# Patient Record
Sex: MALE | Race: WHITE | NOT HISPANIC OR LATINO | Employment: OTHER | URBAN - METROPOLITAN AREA
[De-identification: names, ages, dates, MRNs, and addresses within clinical notes are randomized per-mention and may not be internally consistent; named-entity substitution may affect disease eponyms.]

---

## 2017-01-19 ENCOUNTER — ALLSCRIPTS OFFICE VISIT (OUTPATIENT)
Dept: OTHER | Facility: OTHER | Age: 82
End: 2017-01-19

## 2017-02-03 ENCOUNTER — GENERIC CONVERSION - ENCOUNTER (OUTPATIENT)
Dept: OTHER | Facility: OTHER | Age: 82
End: 2017-02-03

## 2017-02-03 ENCOUNTER — TRANSCRIBE ORDERS (OUTPATIENT)
Dept: ADMINISTRATIVE | Facility: HOSPITAL | Age: 82
End: 2017-02-03

## 2017-02-03 LAB
AMBIG ABBREV CMP14 DEFAULT (HISTORICAL): NORMAL
AMBIG ABBREV LP DEFAULT (HISTORICAL): NORMAL

## 2017-02-04 LAB
CHOLEST SERPL-MCNC: 148 MG/DL (ref 100–199)
HDLC SERPL-MCNC: 41 MG/DL
INTERPRETATION (HISTORICAL): NORMAL
INTERPRETATION (HISTORICAL): NORMAL
LDLC SERPL CALC-MCNC: 56 MG/DL (ref 0–99)
PDF IMAGE (HISTORICAL): NORMAL
TRIGL SERPL-MCNC: 254 MG/DL (ref 0–149)
VLDLC SERPL CALC-MCNC: 51 MG/DL (ref 5–40)

## 2017-02-06 ENCOUNTER — GENERIC CONVERSION - ENCOUNTER (OUTPATIENT)
Dept: OTHER | Facility: OTHER | Age: 82
End: 2017-02-06

## 2017-02-10 ENCOUNTER — ALLSCRIPTS OFFICE VISIT (OUTPATIENT)
Dept: OTHER | Facility: OTHER | Age: 82
End: 2017-02-10

## 2017-03-23 ENCOUNTER — ALLSCRIPTS OFFICE VISIT (OUTPATIENT)
Dept: OTHER | Facility: OTHER | Age: 82
End: 2017-03-23

## 2017-07-24 ENCOUNTER — ALLSCRIPTS OFFICE VISIT (OUTPATIENT)
Dept: OTHER | Facility: OTHER | Age: 82
End: 2017-07-24

## 2017-08-04 ENCOUNTER — ALLSCRIPTS OFFICE VISIT (OUTPATIENT)
Dept: OTHER | Facility: OTHER | Age: 82
End: 2017-08-04

## 2017-08-07 ENCOUNTER — ALLSCRIPTS OFFICE VISIT (OUTPATIENT)
Dept: OTHER | Facility: OTHER | Age: 82
End: 2017-08-07

## 2017-08-16 ENCOUNTER — GENERIC CONVERSION - ENCOUNTER (OUTPATIENT)
Dept: OTHER | Facility: OTHER | Age: 82
End: 2017-08-16

## 2017-08-16 LAB
A/G RATIO (HISTORICAL): 2 (ref 1.2–2.2)
ALBUMIN SERPL BCP-MCNC: 4.6 G/DL (ref 3.5–4.7)
ALP SERPL-CCNC: 55 IU/L (ref 39–117)
ALT SERPL W P-5'-P-CCNC: 32 IU/L (ref 0–44)
AST SERPL W P-5'-P-CCNC: 27 IU/L (ref 0–40)
BASOPHILS # BLD AUTO: 0 %
BASOPHILS # BLD AUTO: 0 X10E3/UL (ref 0–0.2)
BILIRUB SERPL-MCNC: 0.8 MG/DL (ref 0–1.2)
BUN SERPL-MCNC: 17 MG/DL (ref 8–27)
BUN/CREA RATIO (HISTORICAL): 14 (ref 10–24)
CALCIUM SERPL-MCNC: 9.1 MG/DL (ref 8.6–10.2)
CHLORIDE SERPL-SCNC: 100 MMOL/L (ref 96–106)
CO2 SERPL-SCNC: 25 MMOL/L (ref 18–29)
CREAT SERPL-MCNC: 1.25 MG/DL (ref 0.76–1.27)
DEPRECATED RDW RBC AUTO: 12.9 % (ref 12.3–15.4)
EGFR AFRICAN AMERICAN (HISTORICAL): 62 ML/MIN/1.73
EGFR-AMERICAN CALC (HISTORICAL): 54 ML/MIN/1.73
EOSINOPHIL # BLD AUTO: 0.2 X10E3/UL (ref 0–0.4)
EOSINOPHIL # BLD AUTO: 4 %
GLUCOSE SERPL-MCNC: 136 MG/DL (ref 65–99)
HCT VFR BLD AUTO: 41.9 % (ref 37.5–51)
HGB BLD-MCNC: 15 G/DL (ref 12.6–17.7)
IMM.GRANULOCYTES (CD4/8) (HISTORICAL): 0 %
IMM.GRANULOCYTES (CD4/8) (HISTORICAL): 0 X10E3/UL (ref 0–0.1)
LYMPHOCYTES # BLD AUTO: 1.5 X10E3/UL (ref 0.7–3.1)
LYMPHOCYTES # BLD AUTO: 32 %
MCH RBC QN AUTO: 31.3 PG (ref 26.6–33)
MCHC RBC AUTO-ENTMCNC: 35.8 G/DL (ref 31.5–35.7)
MCV RBC AUTO: 88 FL (ref 79–97)
MONOCYTES # BLD AUTO: 0.5 X10E3/UL (ref 0.1–0.9)
MONOCYTES (HISTORICAL): 10 %
NEUTROPHILS # BLD AUTO: 2.6 X10E3/UL (ref 1.4–7)
NEUTROPHILS # BLD AUTO: 54 %
PLATELET # BLD AUTO: 181 X10E3/UL (ref 150–379)
POTASSIUM SERPL-SCNC: 4.2 MMOL/L (ref 3.5–5.2)
RBC (HISTORICAL): 4.79 X10E6/UL (ref 4.14–5.8)
SODIUM SERPL-SCNC: 141 MMOL/L (ref 134–144)
TOT. GLOBULIN, SERUM (HISTORICAL): 2.3 G/DL (ref 1.5–4.5)
TOTAL PROTEIN (HISTORICAL): 6.9 G/DL (ref 6–8.5)
WBC # BLD AUTO: 4.9 X10E3/UL (ref 3.4–10.8)

## 2017-08-17 ENCOUNTER — GENERIC CONVERSION - ENCOUNTER (OUTPATIENT)
Dept: OTHER | Facility: OTHER | Age: 82
End: 2017-08-17

## 2017-08-17 LAB
CREATININE, URINE (HISTORICAL): 181.1 MG/DL
HBA1C MFR BLD HPLC: 6.3 % (ref 4.8–5.6)
INTERPRETATION (HISTORICAL): NORMAL
MICROALBUM.,U,RANDOM (HISTORICAL): 7.6 UG/ML
MICROALBUMIN/CREATININE RATIO (HISTORICAL): 4.2 MG/G CREAT (ref 0–30)
PDF IMAGE (HISTORICAL): NORMAL

## 2017-08-21 ENCOUNTER — GENERIC CONVERSION - ENCOUNTER (OUTPATIENT)
Dept: OTHER | Facility: OTHER | Age: 82
End: 2017-08-21

## 2017-08-22 ENCOUNTER — GENERIC CONVERSION - ENCOUNTER (OUTPATIENT)
Dept: OTHER | Facility: OTHER | Age: 82
End: 2017-08-22

## 2017-08-22 ENCOUNTER — ALLSCRIPTS OFFICE VISIT (OUTPATIENT)
Dept: OTHER | Facility: OTHER | Age: 82
End: 2017-08-22

## 2017-10-02 ENCOUNTER — ALLSCRIPTS OFFICE VISIT (OUTPATIENT)
Dept: OTHER | Facility: OTHER | Age: 82
End: 2017-10-02

## 2017-10-03 NOTE — PROGRESS NOTES
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)   2  Callus (700) (L84)   3  Diabetes mellitus with neuropathy (250 60,357 2) (E11 40)    Plan   · *VB - Foot Exam; Status:Complete;   Done: 44UNH8712 11:06AM   · Follow-up visit in 9 weeks Evaluation and Treatment  Follow-up  Status: Hold For -  Scheduling  Requested for: 47FNC2726   · Diabetes Foot Exam Performed; Status:Complete;   Done: 72VHU2396 11:06AM   · Inspect your feet daily ; Status:Complete;   Done: 80FSI9946 11:06AM   · It is important to take good care of your feet if you have diabetes ; Status:Complete;    Done: 45HAW8154 11:06AM   · There are many things you can do at home to ensure good foot health ; Status:Complete;    Done: 90HIU9498 11:06AM   · Wear shoes that give your toes plenty of room ; Status:Complete;   Done: 54HOD4604  11:06AM    Discussion/Summary  The patient was counseled regarding instructions for management,-prognosis,-patient and family education,-risks and benefits of treatment options,-importance of compliance with treatment  Patient is able to Self-Care  The treatment plan was reviewed with the patient/guardian  The patient/guardian understands and agrees with the treatment plan      Chief Complaint  shoe return      History of Present Illness  HPI: Patient has pain in his toes which shoe wear and ambulation      Review of Systems    Constitutional: feeling poorly, but-as noted in HPI,-no fever-and-no chills  ENT: nasal discharge, but-as noted in HPI  Cardiovascular: no complaints of slow or fast heart rate, no chest pain, no palpitations, no leg claudication or lower extremity edema  Respiratory: cough, but-as noted in HPI  Gastrointestinal: no complaints of abdominal pain, no constipation, no nausea or vomiting, no diarrhea or bloody stools  Genitourinary: no complaints of dysuria or incontinence, no hesitancy, no nocturia, no genital lesion, no inadequacy of penile erection     Musculoskeletal: no complaints of arthralgia, no myalgia, no joint swelling or stiffness, no limb pain or swelling  Integumentary: no complaints of skin rash or lesion, no itching or dry skin, no skin wounds  Active Problems  1  Abnormal EKG (794 31) (R94 31)   2  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)   3  Acute knee pain, right   4  Basal cell carcinoma of skin (173 91) (C44 91)   5  Benign essential hypertension (401 1) (I10)   6  BMI 31 0-31 9,adult (V85 31) (Z68 31)   7  Callus (700) (L84)   8  Cough (786 2) (R05)   9  Deformity of ankle and foot, acquired (736 70) (M21 969)   10  Diabetes mellitus with neuropathy (250 60,357 2) (E11 40)   11  Diabetic neuropathy (250 60,357 2) (E11 40)   12  Dizziness (780 4) (R42)   13  Encounter for prostate cancer screening (V76 44) (Z12 5)   14  Encounter for screening for cardiovascular disorders (V81 2) (Z13 6)   15  Encounter for screening for malignant neoplasm of colon (V76 51) (Z12 11)   16  Encounter for screening for malignant neoplasm of prostate (V76 44) (Z12 5)   17  Hypercholesterolemia (272 0) (E78 00)   18  Hypokalemia (276 8) (E87 6)   19  Limb pain (729 5) (M79 609)   20  Medicare annual wellness visit, initial (V70 0) (Z00 00)   21  Minor depression (311) (F32 9)   22  Need for vaccination (V05 9) (Z23)   23  Never a smoker   24  Non-healing ulcer (707 9) (L98 499)   25  Obesity, Class I, BMI 30-34 9 (278 00) (E66 9)   26  Onychomycosis (110 1) (B35 1)   27  Open wound of foot, left, subsequent encounter (V58 89,892 0) (S91 302D)   28  Pain in both feet (729 5) (M79 671,M79 672)   29  Primary dysthymia (300 4) (F34 1)   30  Screening for hypothyroidism (V77 0) (Z13 29)   31   Wound of skin (782 9) (R23 8)    Past Medical History   · History of Acute upper respiratory infection (465 9) (J06 9)   · History of Ascending cholangitis (576 1) (K83 0)   · History of Atherosclerosis of arteries of extremities (440 20) (I70 209)   · History of Bug bite with infection (919 5,E906 4) (W57 XXXA)   · History of Callus (700) (L84)   · History of Need for vaccination (V05 9) (Z23)   · Screening for genitourinary condition (V81 6) (Z13 89)   · History of Screening for genitourinary condition (V81 6) (Z13 89)    The active problems and past medical history were reviewed and updated today  Surgical History   · History of Appendectomy   · History of Cholecystectomy   · History of Open Treatment Of Fracture Of The Radial Shaft   · History of Tonsillectomy    The surgical history was reviewed and updated today  Family History  Mother    · Family history of Heart disease   · Family history of HTN (hypertension)   · Family history of Liver cancer   · Family history of Melanoma  Father    · Family history of    · Family history of Gastric ulcer  Brother    · Family history of Diabetes  Grandparent    · Family history of Diabetes    Social History   · Never a smoker  The social history was reviewed and updated today  Current Meds   1  Amlodipine Besy-Benazepril HCl - 5-20 MG Oral Capsule; take 1 capsule daily; Therapy: 68AIX5024 to (Last Rx:61Bth8778)  Requested for: 37PHC6685 Ordered   2  Aspirin EC Low Dose 81 MG Oral Tablet Delayed Release; 1 every day; Therapy: 71Uyj2917 to (Last Rx:83Wcm0843) Ordered   3  Cheratussin -10 MG/5ML Oral Syrup; TAKE 5 - 10 ML EVERY 4 TO 6 HOURS AS   NEEDED FOR COUGH; Therapy: 59Jal3604 to (Evaluate:2017); Last Rx:99Zjt1033 Ordered   4  Cinnamon 500 MG Oral Tablet; Therapy: (Recorded:07Zwh3471) to Recorded   5  Coreg CR 20 MG Oral Capsule Extended Release 24 Hour; TAKE 1 CAPSULE EVERY   MORNING WITH FOOD; Therapy: 61AFQ6997 to (Last Rx:73Whg3866)  Requested for: 63IAL0037 Ordered   6  Daily Multiple Vitamins Oral Tablet; Therapy: (Recorded:33Igu4009) to Recorded   7  Triny-C Oral Tablet; Therapy: (Recorded:01Oya8683) to Recorded   8  Fish Oil 1000 MG Oral Capsule Delayed Release;    Therapy: (Recorded:2016) to Recorded   9  Flonase 50 MCG/ACT SUSP; Therapy: (Recorded:89Jmz6541) to Recorded   10  FreeStyle Test In Vitro Strip; test BID; Dx:  E11 4; Therapy: 80BTC9163 to (Last Rx:53Fwl0077)  Requested for: 01Usx2527 Ordered   11  Ibuprofen 600 MG Oral Tablet; TAKE 1 TABLET BY MOUTH 3 TIMES A DAY WITH FOOD    AS NEEDED; Therapy: 91XLE8346 to 40-45-11-94)  Requested for: 72Jjb5813; Last    Rx:91Nlh2830 Ordered   12  Magnesium Oxide 400 MG Oral Tablet; Therapy: (Recorded:48Zcq8354) to Recorded   13  Maxzide-25 TABS; 1/2 tab daily in AM Recorded   14  Mupirocin 2 % External Ointment; APPLY SPARINGLY TO AFFECTED AREA(S) TWICE    DAILY; Therapy: 62BVR2465 to (Last Rx:02Nov2016)  Requested for: 29KSQ1650 Ordered   15  Olopatadine HCl - 0 1 % Ophthalmic Solution; INSTILL 1 DROP INTO AFFECTED    EYE(S) TWICE DAILY AS DIRECTED; Therapy: 37Yyt7742 to (Last Rx:57Foh3557)  Requested for: 76Kyj0001 Ordered   16  Patanol 0 1 % Ophthalmic Solution; INSTILL 1 DROP INTO AFFECTED EYE(S) TWICE    DAILY AS DIRECTED as needed Recorded   17  Potassium Chloride Ghislaine ER 20 MEQ Oral Tablet Extended Release; take 2 tablets twice    a day; Therapy: 79DZX0812 to (Last Rx:56Muq9069)  Requested for: 09PZG7630 Ordered   18  Simvastatin 20 MG Oral Tablet; 1 Every Day At Bedtime; Therapy: 20YDI2918 to (Last Chapo )  Requested for: 57Byo1872 Ordered   19  Sulfamethoxazole-Trimethoprim 800-160 MG Oral Tablet; TAKE 1 TABLET TWICE    DAILY; Therapy: 99ZNX8374 to (Evaluate:68Eps7218)  Requested for: 39Whk4925; Last    Rx:10Big4991 Ordered   20  Triamterene-HCTZ 37 5-25 MG Oral Tablet; TAKE ONE-HALF (1/2) TABLET DAILY; Therapy: 00NMF1354 to (Last Rx:48Aon3335)  Requested for: 99CFI5485 Ordered   21  Viibryd 20 MG Oral Tablet; 1 every day; Therapy: 58MDX1548 to (Last Rx:01Mar2017)  Requested for: 31GQP2547 Ordered    The medication list was reviewed and updated today  Allergies  1   No Known Drug Allergies    Vitals   Recorded: 83RXQ7182 10:46AM   Heart Rate 80   Respiration 17   Systolic 543   Diastolic 83   Height 5 ft 11 in   Weight 221 lb    BMI Calculated 30 82   BSA Calculated 2 2     Physical Exam  Left Foot: Appearance: Normal except as noted: excessive pronation-and-pes planus  Great toe deformities include a bunion  Tenderness: None except the great toe  Right Foot: Appearance: Normal except as noted: excessive pronation-and-pes planus  Great toe deformities include a bunion  Tenderness: None except the great toe  Left Ankle: ROM: limited ROM in all planes Motor: diffuse weakness  Right Ankle: ROM: limited ROM in all planes Motor: diffuse weakness  Neurological Exam: performed  Light touch was decreased bilaterally  Vibratory sensation was decreased in both first metatarsophalangeal joints  Vascular Exam: performed Dorsalis pedis pulses were One/4 bilaterally  Posterior tibial pulses were One/4 bilaterally  Elevation Pallor: present bilaterally  Capillary refill time was greater than 3 seconds bilaterally-and-Q 9, findings bilateral  Negative digital hair  Edema: mild bilaterally-and-No Homans sign  Toenails: All of the toenails were elongated,-hypertrophied,-discolored,-shown to have subungual debris-and-Mycotic  Socks and shoes removed, Right Foot Findings: erythematous and dry  Diminished tactile sensation with monofilament testing throughout the right foot  Socks and shoes removed, Left Foot Findings: erythematous and dry  Diminished tactile sensation with monofilament testing throughout the left foot  Capillary refills findings on the right were delayed in the toes  Pulses:   1+ in the posterior tibialis on the right   1+ in the dorsalis pedis on the right  Capillary refills findings on the left were delayed in the toes  Pulses:   1+ in the posterior tibialis on the left   1+ in the dorsalis pedis on the left     Assign Risk Category: 2: Loss of protective sensation with or without weakness, deformity, callus, pre-ulcer, or history of ulceration  High risk  Hyperkeratosis: present on both first toes,-present on both first sub metatarsals-and-Pinch callus, bilateral    Shoe Gear Evaluation: performed ()  Right Foot: width: E -and-length: 12  Left Foot: width: E -and-length: 12  Recommendation(s): custom inlays  Procedure  Mycotic nails debrided  All pre-ulcerative lesions debrided without pain or complication  Patient will continue to use diabetic inlays as directed      Attending Note  Attending Note [facility]: Patient's History: Patient is concerned with wound of left foot  He has no history of trauma  Key Parts of the Exam: Patient is a superficial wound of the left foot  He has bandage daily  It appears healed        Signatures   Electronically signed by : Terri Turcios DPM; Oct  2 2017 11:07AM EST                       (Author)

## 2017-10-24 ENCOUNTER — GENERIC CONVERSION - ENCOUNTER (OUTPATIENT)
Dept: OTHER | Facility: OTHER | Age: 82
End: 2017-10-24

## 2017-12-04 ENCOUNTER — ALLSCRIPTS OFFICE VISIT (OUTPATIENT)
Dept: OTHER | Facility: OTHER | Age: 82
End: 2017-12-04

## 2017-12-05 NOTE — PROGRESS NOTES
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)   2  Callus (700) (L84)   3  Deformity of ankle and foot, acquired (736 70) (M21 969)   4  Diabetes mellitus with neuropathy (250 60,357 2) (E11 40)    Plan     · Follow-up visit in 9 weeks Evaluation and Treatment  Follow-up  Status: Hold For -Scheduling  Requested for: 62UHX5584   · Diabetes Foot Exam Performed; Status:Complete;   Done: 56ODI0213 10:38AM   · Inspect your feet daily ; Status:Complete;   Done: 22ETP8860 10:38AM   · It is important to take good care of your feet if you have diabetes ; Status:Complete;  Done: 78VPT4372 10:38AM   · *VB - Foot Exam; Status:Complete;   Done: 91VWJ6347 10:37AM    Discussion/Summary  The patient was counseled regarding instructions for management,-- patient and family education,-- importance of compliance with treatment  Patient is able to Self-Care  The treatment plan was reviewed with the patient/guardian  The patient/guardian understands and agrees with the treatment plan      Chief Complaint  Rodolfo TEMPLETON      History of Present Illness  HPI: Patient is a diabetic with pain in his feet with ambulation  No history of trauma  Patient is pre-ulcerative calluses  These hurt with ambulation  Review of Systems   Constitutional: feeling poorly, but-- as noted in HPI,-- no fever-- and-- no chills  ENT: nasal discharge, but-- as noted in HPI  Cardiovascular: no complaints of slow or fast heart rate, no chest pain, no palpitations, no leg claudication or lower extremity edema  Respiratory: cough, but-- as noted in HPI  Gastrointestinal: no complaints of abdominal pain, no constipation, no nausea or vomiting, no diarrhea or bloody stools  Genitourinary: no complaints of dysuria or incontinence, no hesitancy, no nocturia, no genital lesion, no inadequacy of penile erection  Musculoskeletal: no complaints of arthralgia, no myalgia, no joint swelling or stiffness, no limb pain or swelling  Integumentary: no complaints of skin rash or lesion, no itching or dry skin, no skin wounds  Active Problems  1  Abnormal EKG (794 31) (R94 31)   2  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)   3  Acute knee pain, right   4  Basal cell carcinoma of skin (173 91) (C44 91)   5  Benign essential hypertension (401 1) (I10)   6  BMI 31 0-31 9,adult (V85 31) (Z68 31)   7  Callus (700) (L84)   8  Cough (786 2) (R05)   9  Deformity of ankle and foot, acquired (736 70) (M21 969)   10  Diabetes mellitus with neuropathy (250 60,357 2) (E11 40)   11  Diabetic neuropathy (250 60,357 2) (E11 40)   12  Dizziness (780 4) (R42)   13  Encounter for prostate cancer screening (V76 44) (Z12 5)   14  Encounter for screening for cardiovascular disorders (V81 2) (Z13 6)   15  Encounter for screening for malignant neoplasm of colon (V76 51) (Z12 11)   16  Encounter for screening for malignant neoplasm of prostate (V76 44) (Z12 5)   17  Hypercholesterolemia (272 0) (E78 00)   18  Hypokalemia (276 8) (E87 6)   19  Limb pain (729 5) (M79 609)   20  Medicare annual wellness visit, initial (V70 0) (Z00 00)   21  Minor depression (311) (F32 9)   22  Need for vaccination (V05 9) (Z23)   23  Never a smoker   24  Non-healing ulcer (707 9) (L98 499)   25  Obesity, Class I, BMI 30-34 9 (278 00) (E66 9)   26  Onychomycosis (110 1) (B35 1)   27  Open wound of foot, left, subsequent encounter (V58 89,892 0) (S91 302D)   28  Pain in both feet (729 5) (M79 671,M79 672)   29  Primary dysthymia (300 4) (F34 1)   30  Screening for hypothyroidism (V77 0) (Z13 29)   31   Wound of skin (782 9) (R23 8)    Past Medical History   · History of Acute upper respiratory infection (465 9) (J06 9)   · History of Ascending cholangitis (576 1) (K83 0)   · History of Atherosclerosis of arteries of extremities (440 20) (I70 209)   · History of Bug bite with infection (919 5,E906 4) (W57 XXXA)   · History of Callus (700) (L84)   · History of Need for vaccination (V05 9) (Z23)   · Screening for genitourinary condition (V81 6) (Z13 89)   · History of Screening for genitourinary condition (V81 6) (Z13 89)    The active problems and past medical history were reviewed and updated today  Surgical History     · History of Appendectomy   · History of Cholecystectomy   · History of Open Treatment Of Fracture Of The Radial Shaft   · History of Tonsillectomy    The surgical history was reviewed and updated today  Family History  Mother    · Family history of Heart disease   · Family history of HTN (hypertension)   · Family history of Liver cancer   · Family history of Melanoma  Father    · Family history of    · Family history of Gastric ulcer  Brother    · Family history of Diabetes  Grandparent    · Family history of Diabetes    Social History     · Never a smoker  The social history was reviewed and updated today  Current Meds   1  Amlodipine Besy-Benazepril HCl - 5-20 MG Oral Capsule; take 1 capsule daily; Therapy: 22BSG6120 to (Last Rx:2017)  Requested for: 2017 Ordered   2  Aspirin EC Low Dose 81 MG Oral Tablet Delayed Release; 1 every day; Therapy: 35Xpi1190 to (Last Rx:21Ysc3299) Ordered   3  Cheratussin -10 MG/5ML Oral Syrup; TAKE 5 - 10 ML EVERY 4 TO 6 HOURS AS NEEDED FOR COUGH; Therapy: 96Onn5039 to (Evaluate:2017); Last Rx:84Rjn2787 Ordered   4  Cinnamon 500 MG Oral Tablet; Therapy: (Recorded:22Goq9050) to Recorded   5  Coreg CR 20 MG Oral Capsule Extended Release 24 Hour; TAKE 1 CAPSULE EVERY MORNING WITH FOOD; Therapy: 36ZHV9565 to (Last Rx:2017)  Requested for: 60YYO9187 Ordered   6  Daily Multiple Vitamins Oral Tablet; Therapy: (Recorded:95Ybx7591) to Recorded   7  Triny-C Oral Tablet; Therapy: (Recorded:14Hwz9022) to Recorded   8  Fish Oil 1000 MG Oral Capsule Delayed Release; Therapy: (Recorded:2016) to Recorded   9  Flonase 50 MCG/ACT SUSP; Therapy: (Recorded:73Owj1043) to Recorded   10   FreeStyle Test In Vitro Strip; test BID; Dx:  E11 4; Therapy: 69KKA2811 to (Last Rx:81Rem1594)  Requested for: 79Eln4806 Ordered   11  Ibuprofen 600 MG Oral Tablet; TAKE 1 TABLET BY MOUTH 3 TIMES A DAY WITH FOOD  AS NEEDED; Therapy: 50QSI0808 to 40-45-11-94)  Requested for: 60Sxj3932; Last  Rx:68Lzm2401 Ordered   12  Magnesium Oxide 400 MG Oral Tablet; Therapy: (Recorded:89Ntp3140) to Recorded   13  Maxzide-25 TABS; 1/2 tab daily in AM Recorded   14  Mupirocin 2 % External Ointment; APPLY SPARINGLY TO AFFECTED AREA(S) TWICE  DAILY; Therapy: 81OKR4641 to (Last Rx:02Nov2016)  Requested for: 43JQN2208 Ordered   15  Olopatadine HCl - 0 1 % Ophthalmic Solution; INSTILL 1 DROP INTO AFFECTED  EYE(S) TWICE DAILY AS DIRECTED; Therapy: 11Uqz6532 to (Last Rx:40Wew3789)  Requested for: 95Cjn9096 Ordered   16  Patanol 0 1 % Ophthalmic Solution; INSTILL 1 DROP INTO AFFECTED EYE(S) TWICE  DAILY AS DIRECTED as needed Recorded   17  Potassium Chloride Ghislaine ER 20 MEQ Oral Tablet Extended Release; take 2 tablets twice  a day; Therapy: 55BSK0383 to (Last Rx:55Ypb8067)  Requested for: 75WJZ8042 Ordered   18  Simvastatin 20 MG Oral Tablet; 1 Every Day At Bedtime; Therapy: 43WPT2377 to (Last Frausto Bis)  Requested for: 18Xro8550 Ordered   19  Sulfamethoxazole-Trimethoprim 800-160 MG Oral Tablet; TAKE 1 TABLET TWICE  DAILY; Therapy: 89BSV5361 to (Evaluate:56Fpr7896)  Requested for: 62Wag2326; Last  Rx:75Bsg4015 Ordered   20  Triamterene-HCTZ 37 5-25 MG Oral Tablet; TAKE ONE-HALF (1/2) TABLET DAILY; Therapy: 63NPX4437 to (Last Rx:14Nov2017)  Requested for: 43NLT4057 Ordered   21  Viibryd 20 MG Oral Tablet; 1 every day; Therapy: 56OPC2975 to (Last Rx:01Mar2017)  Requested for: 55ZNB7650 Ordered    The medication list was reviewed and updated today  Allergies  1   No Known Drug Allergies    Vitals   Recorded: 32KHT5690 10:13AM   Heart Rate 76   Respiration 16   Systolic 178   Diastolic 81   Height 5 ft 11 in   Weight 221 lb    BMI Calculated 30 82   BSA Calculated 2 2       Physical Exam  Left Foot: Appearance: Normal except as noted: excessive pronation-- and-- pes planus  Great toe deformities include a bunion  Tenderness: None except the great toe  Right Foot: Appearance: Normal except as noted: excessive pronation-- and-- pes planus  Great toe deformities include a bunion  Tenderness: None except the great toe  Left Ankle: ROM: limited ROM in all planes Motor: diffuse weakness  Right Ankle: ROM: limited ROM in all planes Motor: diffuse weakness  Neurological Exam: performed  Light touch was decreased bilaterally  Vibratory sensation was decreased in both first metatarsophalangeal joints  Vascular Exam: performed Dorsalis pedis pulses were One/4 bilaterally  Posterior tibial pulses were One/4 bilaterally  Elevation Pallor: present bilaterally  Capillary refill time was greater than 3 seconds bilaterally-- and-- Q 9, findings bilateral  Negative digital hair  Edema: mild bilaterally-- and-- No Homans sign  Toenails: All of the toenails were elongated,-- hypertrophied,-- discolored,-- shown to have subungual debris-- and-- Mycotic  Socks and shoes removed, Right Foot Findings: erythematous and dry  Diminished tactile sensation with monofilament testing throughout the right foot  Socks and shoes removed, Left Foot Findings: erythematous and dry  Diminished tactile sensation with monofilament testing throughout the left foot  Capillary refills findings on the right were delayed in the toes  Pulses:  1+ in the posterior tibialis on the right  1+ in the dorsalis pedis on the right  Capillary refills findings on the left were delayed in the toes  Pulses:  1+ in the posterior tibialis on the left  1+ in the dorsalis pedis on the left  Assign Risk Category: 2: Loss of protective sensation with or without weakness, deformity, callus, pre-ulcer, or history of ulceration  High risk     Hyperkeratosis: present on both first toes,-- present on both first sub metatarsals-- and-- Pinch callus, bilateral    Shoe Gear Evaluation: performed ()  Right Foot: width: E -- and-- length: 12  Left Foot: width: E -- and-- length: 12  Recommendation(s): custom inlays  Procedure  Patient educated on care of the diabetic foot  To reduce callus formation, patient would benefit from diabetic inlays        Signatures   Electronically signed by : Fiona Stafford DPM; Dec  4 2017 10:38AM EST                       (Author)

## 2018-01-08 ENCOUNTER — GENERIC CONVERSION - ENCOUNTER (OUTPATIENT)
Dept: OTHER | Facility: OTHER | Age: 83
End: 2018-01-08

## 2018-01-10 NOTE — RESULT NOTES
Verified Results  (1) CBC/PLT/DIFF 28Apr2016 09:50AM Jordan James     Test Name Result Flag Reference   WBC COUNT 5 60 Thousand/uL  4 80-10 80   WBC ADJUSTED 5 60 Thousand/uL  4 80-10 80   RBC COUNT 4 89 Million/uL  4 70-6 10   HEMOGLOBIN 14 9 g/dL  14 0-18 0   HEMATOCRIT 45 0 %  42 0-52 0   MCV 92 fL  82-98   MCH 30 6 pg  27 0-31 0   MCHC 33 3 g/dL  31 4-37 4   RDW 12 7 %  11 6-15 1   MPV 8 0 fL L 8 9-12 7   PLATELET COUNT 466 Thousands/uL  130-400   nRBC AUTOMATED 0 /100 WBCs     NEUTROPHILS RELATIVE PERCENT 58 %  43-75   LYMPHOCYTES RELATIVE PERCENT 28 %  14-44   MONOCYTES RELATIVE PERCENT 11 %  4-12   EOSINOPHILS RELATIVE PERCENT 3 %  0-6   BASOPHILS RELATIVE PERCENT 1 %  0-1   NEUTROPHILS ABSOLUTE COUNT 3 20 Thousands/µL  1 85-7 62   LYMPHOCYTES ABSOLUTE COUNT 1 50 Thousands/µL  0 60-4 47   MONOCYTES ABSOLUTE COUNT 0 60 Thousand/µL  0 17-1 22   EOSINOPHILS ABSOLUTE COUNT 0 20 Thousand/µL  0 00-0 61   BASOPHILS ABSOLUTE COUNT 0 00 Thousands/µL  0 00-0 10     (1) COMPREHENSIVE METABOLIC PANEL [id number] 08:81RX [place], [address] Kidney Disease Education Program recommendations are as follows:  GFR calculation is accurate only with a steady state creatinine  Chronic Kidney disease less than 60 ml/min/1 73 sq  meters  Kidney failure less than 15 ml/min/1 73 sq  meters  Test Name Result Flag Reference   GLUCOSE,RANDM 117 mg/dL     If the patient is fasting, the ADA then defines impaired fasting glucose as > 100 mg/dL and diabetes as > or equal to 123 mg/dL     SODIUM 142 mmol/L  136-145   POTASSIUM 3 9 mmol/L  3 5-5 3   CHLORIDE 104 mmol/L  100-108   CARBON DIOXIDE 28 mmol/L  21-32   ANION GAP (CALC) 10 mmol/L  4-13   BLOOD UREA NITROGEN 18 mg/dL  5-25   CREATININE 1 20 mg/dL  0 60-1 30   Standardized to IDMS reference method   CALCIUM 8 8 mg/dL  8 3-10 1   BILI, TOTAL 0 60 mg/dL  0 20-1 00   ALK PHOSPHATAS 56 U/L     ALT (SGPT) 33 U/L  12-78   AST(SGOT) 22 U/L  5-45   ALBUMIN 3 8 g/dL 3 5-5 0   TOTAL PROTEIN 7 1 g/dL  6 4-8 2   eGFR Non-African American 58 3 ml/min/1 73sq m       (1) LIPID PANEL, FASTING 28Apr2016 09:50AM [facility]   Triglyceride:         Normal              <150 mg/dl       Borderline High    150-199 mg/dl       High               200-499 mg/dl       Very High          >499 mg/dl  Cholesterol:         Desirable        <200 mg/dl      Borderline High  200-239 mg/dl      High             >239 mg/dl  HDL Cholesterol:        High    >59 mg/dL      Low     <41 mg/dL  LDL CALCULATED:    This screening LDL is a calculated result  It does not have the accuracy of the Direct Measured LDL in the monitoring of patients with hyperlipidemia and/or statin therapy  Direct Measure LDL (DCI350) must be ordered separately in these patients  Test Name Result Flag Reference   CHOLESTEROL 141 mg/dL     HDL,DIRECT 41 mg/dL  40-60   Specimen collection should occur prior to Metamizole administration due to the potential for falsely depressed results  LDL CHOLESTEROL CALCULATED 56 mg/dL  0-100   TRIGLYCERIDES 219 mg/dL H <=150   Specimen collection should occur prior to N-Acetylcysteine or Metamizole administration due to the potential for falsely depressed results  (1) TSH 28Apr2016 09:50AM [facility]   Patients undergoing fluorescein dye angiography may retain small amounts of fluorescein in the body for 48-72 hours post procedure  Samples containing fluorescein can produce falsely depressed TSH values  If the patient had this procedure,a specimen should be resubmitted post fluorescein clearance       Test Name Result Flag Reference   TSH 1 857 uIU/mL  0 358-3 740     (1) MICROALBUMIN CREATININE RATIO, RANDOM URINE 28Apr2016 09:50AM [facility]     Test Name Result Flag Reference   MICROALBUMIN/ CREAT R 10 mg/g creatinine  0-30   MICROALBUMIN,URINE 11 1 mg/L  0 0-20 0   CREATININE URINE 116 0 mg/dL         Discussion/Summary   please make appt to discuss labs

## 2018-01-12 VITALS
RESPIRATION RATE: 18 BRPM | DIASTOLIC BLOOD PRESSURE: 72 MMHG | SYSTOLIC BLOOD PRESSURE: 130 MMHG | BODY MASS INDEX: 31.36 KG/M2 | TEMPERATURE: 98.2 F | HEART RATE: 76 BPM | HEIGHT: 71 IN | WEIGHT: 224 LBS

## 2018-01-12 LAB
. (HISTORICAL): NORMAL

## 2018-01-12 NOTE — RESULT NOTES
Verified Results  (1) CBC/PLT/DIFF [id number] 10:19AM [facility]     Test Name Result Flag Reference   WBC 5 2 x10E3/uL  3 4-10 8   RBC 4 93 x10E6/uL  4 14-5 80   Hemoglobin 15 5 g/dL  12 6-17 7   Hematocrit 44 2 %  37 5-51 0   MCV 90 fL  79-97   MCH 31 4 pg  26 6-33 0   MCHC 35 1 g/dL  31 5-35 7   RDW 13 9 %  12 3-15 4   Platelets 059 I61E2/YZ  150-379   Neutrophils 59 %     Lymphs 29 %     Monocytes 8 %     Eos 3 %     Basos 1 %     Neutrophils (Absolute) 3 1 x10E3/uL  1 4-7 0   Lymphs (Absolute) 1 5 x10E3/uL  0 7-3 1   Monocytes(Absolute) 0 4 x10E3/uL  0 1-0 9   Eos (Absolute) 0 2 x10E3/uL  0 0-0 4   Baso (Absolute) 0 0 x10E3/uL  0 0-0 2   Immature Granulocytes 0 %     Immature Grans (Abs) 0 0 x10E3/uL  0 0-0 1     (1) COMPREHENSIVE METABOLIC PANEL [id number] 54:32SO [facility]     Test Name Result Flag Reference   Glucose, Serum 114 mg/dL H 65-99   BUN 17 mg/dL  8-27   Creatinine, Serum 1 18 mg/dL  0 76-1 27   eGFR If NonAfricn Am 58 mL/min/1 73 L >59   eGFR If Africn Am 67 mL/min/1 73  >59   BUN/Creatinine Ratio 14  10-22   Sodium, Serum 144 mmol/L  134-144   Potassium, Serum 4 1 mmol/L  3 5-5 2   Chloride, Serum 101 mmol/L     Carbon Dioxide, Total 26 mmol/L  18-29   Calcium, Serum 9 0 mg/dL  8 6-10 2   Protein, Total, Serum 6 6 g/dL  6 0-8 5   Albumin, Serum 4 4 g/dL  3 5-4 7   Globulin, Total 2 2 g/dL  1 5-4 5   A/G Ratio 2 0  1 1-2 5   Bilirubin, Total 0 7 mg/dL  0 0-1 2   Alkaline Phosphatase, S 56 IU/L     AST (SGOT) 21 IU/L  0-40   ALT (SGPT) 24 IU/L  0-44     [facility]) Cardiovascular Risk Assessment [id number] 10:19AM Tl Gruber     Test Name Result Flag Reference   Interpretation NTAP     PDF Image Not applicable       [facility]) [address] [place] [address] Blvd CKD Program [id number] 10:19AM Tl Gruber     Test Name Result Flag Reference   Interpretation Note     -------------------------------  CHRONIC KIDNEY DISEASE:  EGFR, BLOOD PRESSURE, AND PROTEINURIA ASSESSMENT  We presume eGFR has been less than 60 mL/min/1 73mE2 on at  least two occasions spaced at least 3 months apart  Current  eGFR is 58 mL/min/1 73mE2 corresponding to CKD stage 3a  Multiply eGFR by 1 159 if patient is   Potassium is within goal, 4 1 mmol/L  EGFR, BLOOD PRESSURE, AND PROTEINURIA TREATMENT SUGGESTIONS  -  Given patient's advanced age, we are unable to provide  specific blood pressure treatment suggestions  Individualize  blood pressure goals based on the patient's comorbidities  and to avoid orthostatic hypotension and other medication  side effects  Assessment of albuminuria (urine  albumin:creatinine ratio or urine protein:creatinine ratio  preferred) is recommended at least annually in CKD patients  for staging and disease prognosis  EGFR, BLOOD PRESSURE, AND PROTEINURIA FOLLOW-UP  -  fasting Renal Panel within 12 months; Spot Urine Panel is  recommended by KDOQI guidelines, at least yearly;  -  BONE and MINERAL ASSESSMENT  Calcium is within goal, 9 0 mg/dL  Carbon Dioxide is within  goal, 26 mmol/L  KDOQI guidelines recommend the measurement  of 25-hydroxy vitamin D in patients with CKD  BONE and MINERAL TREATMENT SUGGESTIONS  -  Interpretations require simultaneous measurements of serum  calcium and phosphorus  BONE and MINERAL FOLLOW-UP  -  fasting PTH with Renal Panel and 25-Hydroxy Vitamin D are  recommended by KDOQI guidelines, at least yearly;  -  LIPIDS ASSESSMENT  LDL-C is optimal, 56 mg/dL  Triglyceride is high, 254 mg/dL  Non-HDL Cholesterol is normal, 107 mg/dL  HDL-C is normal,  41 mg/dL  LIPIDS TREATMENT SUGGESTIONS  -  Therapeutic lifestyle changes are always valuable to  maintain optimal blood lipid status (diet, exercise, weight  management)  Continue statin if in use  Consider measurement  of LDL particle number or Apo B to adjudicate need for  further LDL lowering therapy   If statin cannot be tolerated  or increased, alternatives include use of an intestinal  agent (ezetimibe or bile acid sequestrant), niacin, and/or  fish oil  LIPIDS FOLLOW-UP  -  fasting Lipid Panel within 12 months;  -  ANEMIA ASSESSMENT  Hemoglobin is normal, 15 5 g/dL  Hemoglobin target assumes  CAMERON is not in use  ANEMIA TREATMENT SUGGESTIONS  -  No specific change of treatment is indicated at this time  ANEMIA FOLLOW-UP  -  CBC within 12 months;  -------------------------------  DISCLAIMER  These assessments and treatment suggestions are provided as  a convenience in support of the physician-patient  relationship and are not intended to replace the physician's  clinical judgment  They are derived from the national  guidelines in addition to other evidence and expert opinion  The clinician should consider this information within the  context of clinical opinion and the individual patient  SEE GUIDANCE FOR CHRONIC KIDNEY DISEASE PROGRAM: National  Kidney Foundation Kidney Disease Outcomes Quality Initiative  (KDOQI (TM)), with its limitations and disclaimers, are at  www kidney  org/professionals/KDOQI  Kidney Disease Improving  Global Outcomes (KDIGO) clinical practice guidelines are at  http://kdigo  org/home/guidelines/  The members of  Syeda Youssef national advisory panel are listed at  www  [employer] com  This program is intended for patients who  have been diagnosed with stages 3, 4, or pre-dialysis 5 CKD  It is not intended for children, pregnant patients, or  transplant patients  PDF Image   [facility]) Lipid Panel [id number] 10:19AM Ghada Varinder     Test Name Result Flag Reference   LDL Cholesterol Calc 56 mg/dL  0-99   VLDL Cholesterol Scooby 51 mg/dL H 5-40   HDL Cholesterol 41 mg/dL  >39   Triglycerides 254 mg/dL H 0-149   Cholesterol, Total 148 mg/dL  100-199     [facility]) [facility] CMP14 Default [id number] 10:19AM [facility]     Test Name Result Flag Reference   [facility] CMP14 Default Comment     A hand-written panel/profile was received from your office   In  accordance with the [facility] Ambiguous Test Code Policy dated July 5433, we have completed your order by using the closest currently  or formerly recognized AMA panel  We have assigned Comprehensive  Metabolic Panel (14), Test Code #869221 to this request   If this  is not the testing you wished to receive on this specimen, please  contact the [address] [place] [address] Client Inquiry/Technical Services Department  to clarify the test order  We appreciate your business  [facility]) [facility] Default [id number] 10:19AM Flo[facility] Al[facility]aco     Test Name Result Flag Reference   [facility] Default Comment     A hand-written panel/profile was received from your office  In  accordance with the [facility] Ambiguous Test Code Policy dated July 6031, we have completed your order by using the closest currently  or formerly recognized AMA panel  We have assigned Lipid Panel,  Test Code #616771 to this request  If this is not the testing you  wished to receive on this specimen, please contact the [address] [place] [address]  Client Inquiry/Technical Services Department to clarify the test  order  We appreciate your business         Discussion/Summary   will discuss labs at follow up appt

## 2018-01-12 NOTE — RESULT NOTES
Discussion/Summary   labs are outstanding     Verified Results  (1) CBC/PLT/DIFF [id number] 09:36AM Heidi Fuller     Test Name Result Flag Reference   WBC 4 9 x10E3/uL  3 4-10 8   RBC 4 79 x10E6/uL  4 14-5 80   Hemoglobin 15 0 g/dL  12 6-17 7   Hematocrit 41 9 %  37 5-51 0   MCV 88 fL  79-97   MCH 31 3 pg  26 6-33 0   MCHC 35 8 g/dL H 31 5-35 7   RDW 12 9 %  12 3-15 4   Platelets 701 A79J8/XZ  150-379   Neutrophils 54 %     Lymphs 32 %     Monocytes 10 %     Eos 4 %     Basos 0 %     Neutrophils (Absolute) 2 6 x10E3/uL  1 4-7 0   Lymphs (Absolute) 1 5 x10E3/uL  0 7-3 1   Monocytes(Absolute) 0 5 x10E3/uL  0 1-0 9   Eos (Absolute) 0 2 x10E3/uL  0 0-0 4   Baso (Absolute) 0 0 x10E3/uL  0 0-0 2   Immature Granulocytes 0 %     Immature Grans (Abs) 0 0 x10E3/uL  0 0-0 1     (1) COMPREHENSIVE METABOLIC PANEL [id number] 31:07MC Heidi Fuller     Test Name Result Flag Reference   Glucose, Serum 136 mg/dL H 65-99   BUN 17 mg/dL  8-27   Creatinine, Serum 1 25 mg/dL  0 76-1 27   BUN/Creatinine Ratio 14  10-24   Sodium, Serum 141 mmol/L  134-144   Potassium, Serum 4 2 mmol/L  3 5-5 2   Chloride, Serum 100 mmol/L     Carbon Dioxide, Total 25 mmol/L  18-29   Calcium, Serum 9 1 mg/dL  8 6-10 2   Protein, Total, Serum 6 9 g/dL  6 0-8 5   Albumin, Serum 4 6 g/dL  3 5-4 7   Globulin, Total 2 3 g/dL  1 5-4 5   A/G Ratio 2 0  1 2-2 2   Bilirubin, Total 0 8 mg/dL  0 0-1 2   Alkaline Phosphatase, S 55 IU/L     AST (SGOT) 27 IU/L  0-40   ALT (SGPT) 32 IU/L  0-44   eGFR If NonAfricn Am 54 mL/min/1 73 L >59   eGFR If Africn Am 62 mL/min/1 73  >59     (1) HEMOGLOBIN A1C [id number] 09:36AM Heidi Fuller     Test Name Result Flag Reference   Hemoglobin A1c 6 3 % H 4 8-5 6   Pre-diabetes: 5 7 - 6 4           Diabetes: >6 4           Glycemic control for adults with diabetes: <7 0

## 2018-01-12 NOTE — RESULT NOTES
Verified Results  (1) TISSUE EXAM [id number] 12:00AM Flores Lua     Test Name Result Flag Reference   LAB AP CASE REPORT (Report)     Surgical Pathology Report             Case: [id number]                   Authorizing Provider: Sahil Brewster DO     Collected:      11/09/2016           Pathologist:      Theodora Zarate MD      Received:      11/10/2016 1212        Specimen:  Skin, Other, Biopsy punch of right shoulder/lateral   LAB AP FINAL DIAGNOSIS (Report)     A  Skin, Biopsy punch of right shoulder/lateral:  - Basal cell carcinoma, nodular type with infiltrative features, extending   to the    peripheral borders of biopsy  Interpretation performed at [facility], [address] [place] [address]  Electronically signed by Theodora Zarate MD on 11/14/2016 at 3:17 PM   LAB AP SURGICAL ADDITIONAL INFORMATION (Report)     These tests were developed and their performance characteristics   determined by [facility] or [facility]  They may not be cleared or approved by the [place]  Food and   Drug Administration  The FDA has determined that such clearance or   approval is not necessary  These tests are used for clinical purposes  They should not be regarded as investigational or for research  This   laboratory has been approved by CLIA 88, designated as a high-complexity   laboratory and is qualified to perform these tests  LAB AP GROSS DESCRIPTION (Report)     A  The specimen is received in formalin, labeled with the patient's name   and hospital number, and is designated biopsy punch right   shoulder/lateral  The specimen consists of a tan portion of skin   measuring 0 3 cm in diameter, excised to a depth of 0 2-0 3 cm  The skin   surface appears keratotic  The margin is inked blue  The skin surface is   inked red  Entirely submitted  One cassette      Note: The estimated total formalin fixation time based upon information   provided by the submitting clinician and the standard processing schedule   is 27 5 hours   MAC       Plan  Basal cell carcinoma of skin    · 2 - Ria Childers MD, Veronica Momin (Dermatology) Physician Referral  Consult  Status: Hold For -  Scheduling  Requested for: [id number]  Care Summary provided  : Yes    Discussion/Summary   discussed resilts with pt

## 2018-01-13 VITALS
SYSTOLIC BLOOD PRESSURE: 132 MMHG | HEIGHT: 71 IN | BODY MASS INDEX: 31.92 KG/M2 | HEART RATE: 78 BPM | WEIGHT: 228 LBS | RESPIRATION RATE: 16 BRPM | DIASTOLIC BLOOD PRESSURE: 76 MMHG

## 2018-01-14 VITALS
BODY MASS INDEX: 31.5 KG/M2 | DIASTOLIC BLOOD PRESSURE: 73 MMHG | HEIGHT: 71 IN | RESPIRATION RATE: 16 BRPM | SYSTOLIC BLOOD PRESSURE: 131 MMHG | HEART RATE: 80 BPM | WEIGHT: 225 LBS

## 2018-01-14 VITALS
HEART RATE: 71 BPM | BODY MASS INDEX: 31.36 KG/M2 | SYSTOLIC BLOOD PRESSURE: 141 MMHG | DIASTOLIC BLOOD PRESSURE: 83 MMHG | RESPIRATION RATE: 17 BRPM | HEIGHT: 71 IN | WEIGHT: 224 LBS

## 2018-01-14 VITALS
HEIGHT: 71 IN | SYSTOLIC BLOOD PRESSURE: 132 MMHG | RESPIRATION RATE: 18 BRPM | HEART RATE: 72 BPM | BODY MASS INDEX: 30.94 KG/M2 | DIASTOLIC BLOOD PRESSURE: 80 MMHG | WEIGHT: 221 LBS | TEMPERATURE: 97.2 F

## 2018-01-14 VITALS
RESPIRATION RATE: 17 BRPM | DIASTOLIC BLOOD PRESSURE: 71 MMHG | HEART RATE: 76 BPM | BODY MASS INDEX: 31.5 KG/M2 | SYSTOLIC BLOOD PRESSURE: 118 MMHG | WEIGHT: 225 LBS | HEIGHT: 71 IN

## 2018-01-15 ENCOUNTER — GENERIC CONVERSION - ENCOUNTER (OUTPATIENT)
Dept: OTHER | Facility: OTHER | Age: 83
End: 2018-01-15

## 2018-01-15 VITALS
BODY MASS INDEX: 30.94 KG/M2 | RESPIRATION RATE: 17 BRPM | DIASTOLIC BLOOD PRESSURE: 83 MMHG | HEIGHT: 71 IN | SYSTOLIC BLOOD PRESSURE: 133 MMHG | WEIGHT: 221 LBS | HEART RATE: 80 BPM

## 2018-01-15 VITALS
RESPIRATION RATE: 20 BRPM | HEIGHT: 71 IN | DIASTOLIC BLOOD PRESSURE: 70 MMHG | SYSTOLIC BLOOD PRESSURE: 120 MMHG | WEIGHT: 225 LBS | BODY MASS INDEX: 31.5 KG/M2 | HEART RATE: 82 BPM | TEMPERATURE: 98.2 F

## 2018-01-16 NOTE — RESULT NOTES
Verified Results  (1) CK (CPK) [id number] 11:39AM Milton Verao     Test Name Result Flag Reference   CK (CPK) 223 U/L       (1) TROPONIN I [id number] 11:39AM [facility]     Test Name Result Flag Reference   TROPONIN I <0 02 ng/mL  <0 04   [employer] Chemistry analyzer 99% cutoff is > 0 04 ng/mL in network labs    o cTnI 99% cutoff is useful only when applied to patients in the clinical setting of myocardial ischemia  o cTnI 99% cutoff should be interpreted in the context of clinical history, ECG findings and possibly cardiac imaging to establish correct diagnosis  o cTnI 99% cutoff may be suggestive but clearly not indicative of a coronary event without the clinical setting of myocardial ischemia       (1) CK (CPK) [id number] 11:39AM Milton Verao     Test Name Result Flag Reference   CK MB FRACTION 1 8 ng/mL  0 0-5 0   CK-MB INDEX <1 0 %  0 0-2 5       Discussion/Summary   spoke with pt doing well , meds were adjusted at visit will follow

## 2018-01-22 VITALS
HEIGHT: 71 IN | RESPIRATION RATE: 16 BRPM | BODY MASS INDEX: 30.94 KG/M2 | SYSTOLIC BLOOD PRESSURE: 143 MMHG | HEART RATE: 76 BPM | DIASTOLIC BLOOD PRESSURE: 81 MMHG | WEIGHT: 221 LBS

## 2018-01-22 VITALS
SYSTOLIC BLOOD PRESSURE: 133 MMHG | DIASTOLIC BLOOD PRESSURE: 83 MMHG | BODY MASS INDEX: 30.94 KG/M2 | RESPIRATION RATE: 17 BRPM | HEART RATE: 80 BPM | WEIGHT: 221 LBS | HEIGHT: 71 IN

## 2018-01-23 NOTE — RESULT NOTES
Discussion/Summary   pt aware     Verified Results  [facility]) Pathology Report [id number] 12:00AM [employer]     Test Name Result Flag Reference     Material submitted:                      RIGHT TEMPLE   Material submitted:                                          RIGHT TEMPLE     Clinical history:                       CANCER   Clinical history:                                            CANCER          **********************************************************************  Diagnosis:  RIGHT TEMPLE:  SEBORRHEIC KERATOSIS, VERRUCOUS, EXCORIATED  ULCERATED  INFLAMED  PIN/01/12/2018  **********************************************************************   **********************************************************************  Diagnosis:  RIGHT TEMPLE:  SEBORRHEIC KERATOSIS, VERRUCOUS, EXCORIATED  ULCERATED  INFLAMED  PIN/01/12/2018  **********************************************************************     Electronically signed:                      Charbel Bond MD, Dermatopathologist   Electronically signed:                                        Charbel Bond MD, Dermatopathologist     (Report)     Gross description:                       RIGHT TEMPLE:  The specimen is received in a single container of formalin labeled  with the patients name  Received in formalin is 1 piece of skin  measuring 0 5 x 0 4 x 0 3 cm which is inked bisected and submitted in  toto in 1 cassette  Specimen may further fragment during processing  Erwin Vargas description:                                           RIGHT TEMPLE:  The specimen is received in a single container of formalin labeled  with the patients name  Received in formalin is 1 piece of skin  measuring 0 5 x 0 4 x 0 3 cm which is inked bisected and submitted in  toto in 1 cassette  Specimen may further fragment during processing  Erwin MONROE/MABEL     Pathologist provided ICD-10:  L82 1   Pathologist provided ICD-10:  L82 1           CPT    415250   CPT                                                            [id number]

## 2018-01-24 VITALS
TEMPERATURE: 98.6 F | HEIGHT: 71 IN | RESPIRATION RATE: 16 BRPM | DIASTOLIC BLOOD PRESSURE: 76 MMHG | SYSTOLIC BLOOD PRESSURE: 126 MMHG | HEART RATE: 84 BPM | BODY MASS INDEX: 31.92 KG/M2 | WEIGHT: 228 LBS

## 2018-02-05 ENCOUNTER — OFFICE VISIT (OUTPATIENT)
Dept: PODIATRY | Facility: CLINIC | Age: 83
End: 2018-02-05
Payer: COMMERCIAL

## 2018-02-05 VITALS
WEIGHT: 229 LBS | SYSTOLIC BLOOD PRESSURE: 138 MMHG | HEIGHT: 71 IN | RESPIRATION RATE: 17 BRPM | HEART RATE: 71 BPM | BODY MASS INDEX: 32.06 KG/M2 | DIASTOLIC BLOOD PRESSURE: 82 MMHG

## 2018-02-05 DIAGNOSIS — I70.209 PERIPHERAL ARTERIOSCLEROSIS (HCC): ICD-10-CM

## 2018-02-05 DIAGNOSIS — M79.672 PAIN IN BOTH FEET: ICD-10-CM

## 2018-02-05 DIAGNOSIS — L84 CORNS: Primary | ICD-10-CM

## 2018-02-05 DIAGNOSIS — M79.671 PAIN IN BOTH FEET: ICD-10-CM

## 2018-02-05 DIAGNOSIS — E11.42 DIABETIC POLYNEUROPATHY ASSOCIATED WITH TYPE 2 DIABETES MELLITUS (HCC): ICD-10-CM

## 2018-02-05 PROCEDURE — 11056 PARNG/CUTG B9 HYPRKR LES 2-4: CPT | Performed by: PODIATRIST

## 2018-02-05 PROCEDURE — PBNCHG PB NO CHARGE PLACEHOLDER: Performed by: PODIATRIST

## 2018-02-05 RX ORDER — OMEGA-3 FATTY ACIDS CAP DELAYED RELEASE 1000 MG 1000 MG
CAPSULE DELAYED RELEASE ORAL
COMMUNITY

## 2018-02-05 RX ORDER — FLUTICASONE PROPIONATE 50 MCG
SPRAY, SUSPENSION (ML) NASAL
COMMUNITY

## 2018-02-05 RX ORDER — TRIAMTERENE AND HYDROCHLOROTHIAZIDE 37.5; 25 MG/1; MG/1
TABLET ORAL
COMMUNITY
End: 2018-03-17 | Stop reason: SDUPTHER

## 2018-02-05 RX ORDER — SIMVASTATIN 20 MG
TABLET ORAL
COMMUNITY
Start: 2018-01-04 | End: 2018-02-27 | Stop reason: SDUPTHER

## 2018-02-05 RX ORDER — IBUPROFEN 600 MG/1
TABLET ORAL
COMMUNITY
Start: 2015-05-22 | End: 2018-04-16

## 2018-02-05 RX ORDER — GUAIFENESIN AND CODEINE PHOSPHATE 100; 10 MG/5ML; MG/5ML
5-10 SOLUTION ORAL
COMMUNITY
Start: 2017-04-17 | End: 2018-04-16

## 2018-02-05 RX ORDER — SULFAMETHOXAZOLE AND TRIMETHOPRIM 800; 160 MG/1; MG/1
1 TABLET ORAL 2 TIMES DAILY
COMMUNITY
Start: 2017-08-04 | End: 2019-10-11 | Stop reason: ALTCHOICE

## 2018-02-05 RX ORDER — MUPIROCIN CALCIUM 20 MG/G
CREAM TOPICAL 2 TIMES DAILY
COMMUNITY
Start: 2016-11-02 | End: 2021-05-11

## 2018-02-05 RX ORDER — ASPIRIN 81 MG/1
81 TABLET ORAL 3 TIMES WEEKLY
COMMUNITY
Start: 2017-08-22 | End: 2019-10-11 | Stop reason: ALTCHOICE

## 2018-02-05 RX ORDER — OLOPATADINE HYDROCHLORIDE 1 MG/ML
1 SOLUTION/ DROPS OPHTHALMIC 2 TIMES DAILY
COMMUNITY
Start: 2017-04-17 | End: 2021-04-29 | Stop reason: SDUPTHER

## 2018-02-05 RX ORDER — POTASSIUM CHLORIDE 20 MEQ/1
2 TABLET, EXTENDED RELEASE ORAL 2 TIMES DAILY
COMMUNITY
Start: 2016-10-11 | End: 2018-07-10 | Stop reason: SDUPTHER

## 2018-02-05 RX ORDER — VILAZODONE HYDROCHLORIDE 20 MG/1
TABLET ORAL DAILY
COMMUNITY
Start: 2016-12-19 | End: 2018-04-16 | Stop reason: SDUPTHER

## 2018-02-05 RX ORDER — BENZONATATE 200 MG/1
CAPSULE ORAL
COMMUNITY
Start: 2018-01-08 | End: 2018-04-16

## 2018-02-05 RX ORDER — AMLODIPINE BESYLATE AND BENAZEPRIL HYDROCHLORIDE 5; 20 MG/1; MG/1
CAPSULE ORAL
COMMUNITY
Start: 2018-01-04 | End: 2018-06-04 | Stop reason: SDUPTHER

## 2018-02-05 RX ORDER — CARVEDILOL PHOSPHATE 20 MG/1
CAPSULE, EXTENDED RELEASE ORAL
COMMUNITY
Start: 2018-01-04 | End: 2018-06-01 | Stop reason: SDUPTHER

## 2018-02-05 RX ORDER — INFLUENZA A VIRUSA/MICHIGAN/45/2015 X-275 (H1N1) ANTIGEN (FORMALDEHYDE INACTIVATED), INFLUENZA A VIRUS A/HONG KONG/4801/2014 X-263B (H3N2) ANTIGEN (FORMALDEHYDE INACTIVATED), AND INFLUENZA B VIRUS B/BRISBANE/60/2008 ANTIGEN (FORMALDEHYDE INACTIVATED) 60; 60; 60 UG/.5ML; UG/.5ML; UG/.5ML
INJECTION, SUSPENSION INTRAMUSCULAR
Refills: 0 | COMMUNITY
Start: 2017-11-30 | End: 2018-04-16

## 2018-02-05 NOTE — PROGRESS NOTES
Assessment/Plan:    No problem-specific Assessment & Plan notes found for this encounter  There are no diagnoses linked to this encounter  Subjective:      Patient ID: Claudette Makos is a 81 y o  male  The patient was counseled regarding instructions for management,-- patient and family education,-- importance of compliance with treatment  Patient is able to Self-Care  The treatment plan was reviewed with the patient/guardian  The patient/guardian understands and agrees with the treatment plan      Chief Complaint  Rodolfo TEMPLETON      History of Present Illness  HPI: Patient is a diabetic with pain in his feet with ambulation  No history of trauma  Patient is pre-ulcerative calluses  These hurt with ambulation  Review of Systems   Constitutional: feeling poorly, but-- as noted in HPI,-- no fever-- and-- no chills  ENT: nasal discharge, but-- as noted in HPI  Cardiovascular: no complaints of slow or fast heart rate, no chest pain, no palpitations, no leg claudication or lower extremity edema  Respiratory: cough, but-- as noted in HPI  Gastrointestinal: no complaints of abdominal pain, no constipation, no nausea or vomiting, no diarrhea or bloody stools  Genitourinary: no complaints of dysuria or incontinence, no hesitancy, no nocturia, no genital lesion, no inadequacy of penile erection  Musculoskeletal: no complaints of arthralgia, no myalgia, no joint swelling or stiffness, no limb pain or swelling  Integumentary: no complaints of skin rash or lesion, no itching or dry skin, no skin wounds  Active Problems  1  Abnormal EKG (794 31) (R94 31)   2  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)   3  Acute knee pain, right   4  Basal cell carcinoma of skin (173 91) (C44 91)   5  Benign essential hypertension (401 1) (I10)   6  BMI 31 0-31 9,adult (V85 31) (Z68 31)   7  Callus (700) (L84)   8  Cough (786 2) (R05)   9  Deformity of ankle and foot, acquired (736 70) (M21 969)   10  Diabetes mellitus with neuropathy (250 60,357 2) (E11 40)   11  Diabetic neuropathy (250 60,357 2) (E11 40)   12  Dizziness (780 4) (R42)   13  Encounter for prostate cancer screening (V76 44) (Z12 5)   14  Encounter for screening for cardiovascular disorders (V81 2) (Z13 6)   15  Encounter for screening for malignant neoplasm of colon (V76 51) (Z12 11)   16  Encounter for screening for malignant neoplasm of prostate (V76 44) (Z12 5)   17  Hypercholesterolemia (272 0) (E78 00)   18  Hypokalemia (276 8) (E87 6)   19  Limb pain (729 5) (M79 609)   20  Medicare annual wellness visit, initial (V70 0) (Z00 00)   21  Minor depression (311) (F32 9)   22  Need for vaccination (V05 9) (Z23)   23  Never a smoker   24  Non-healing ulcer (707 9) (L98 499)   25  Obesity, Class I, BMI 30-34 9 (278 00) (E66 9)   26  Onychomycosis (110 1) (B35 1)   27  Open wound of foot, left, subsequent encounter (V58 89,892 0) (S91 302D)   28  Pain in both feet (729 5) (M79 671,M79 672)   29  Primary dysthymia (300 4) (F34 1)   30  Screening for hypothyroidism (V77 0) (Z13 29)   31  Wound of skin (782 9) (R23 8)     Past Medical History   · History of Acute upper respiratory infection (465 9) (J06 9)   · History of Ascending cholangitis (576 1) (K83 0)   · History of Atherosclerosis of arteries of extremities (440 20) (I70 209)   · History of Bug bite with infection (919 5,E906 4) (W57 XXXA)   · History of Callus (700) (L84)   · History of Need for vaccination (V05 9) (Z23)   · Screening for genitourinary condition (V81 6) (Z13 89)   · History of Screening for genitourinary condition (V81 6) (Z13 89)     The active problems and past medical history were reviewed and updated today  Surgical History      · History of Appendectomy   · History of Cholecystectomy   · History of Open Treatment Of Fracture Of The Radial Shaft   · History of Tonsillectomy     The surgical history was reviewed and updated today         Family History  Mother    · Family history of Heart disease   · Family history of HTN (hypertension)   · Family history of Liver cancer   · Family history of Melanoma  Father    · Family history of    · Family history of Gastric ulcer  Brother    · Family history of Diabetes  Grandparent    · Family history of Diabetes     Social History      · Never a smoker  The social history was reviewed and updated today  Current Meds   1  Amlodipine Besy-Benazepril HCl - 5-20 MG Oral Capsule; take 1 capsule daily; Therapy: 17PAX0362 to (Last Rx:57Dbc1019)  Requested for: 45Zjo2011 Ordered   2  Aspirin EC Low Dose 81 MG Oral Tablet Delayed Release; 1 every day; Therapy: 01Tfr5658 to (Last Rx:02Uka3287) Ordered   3  Cheratussin -10 MG/5ML Oral Syrup; TAKE 5 - 10 ML EVERY 4 TO 6 HOURS AS NEEDED FOR COUGH; Therapy: 26Eoa4428 to (Evaluate:2017); Last Rx:43Ivr2344 Ordered   4  Cinnamon 500 MG Oral Tablet; Therapy: (Recorded:12Vgu4790) to Recorded   5  Coreg CR 20 MG Oral Capsule Extended Release 24 Hour; TAKE 1 CAPSULE EVERY MORNING WITH FOOD; Therapy: 95VDM1518 to (Last Rx:67Fxi8482)  Requested for: 48BKS1319 Ordered   6  Daily Multiple Vitamins Oral Tablet; Therapy: (Recorded:66Lce8631) to Recorded   7  Triny-C Oral Tablet; Therapy: (Recorded:86Gzk3513) to Recorded   8  Fish Oil 1000 MG Oral Capsule Delayed Release; Therapy: (Recorded:98Xdw6495) to Recorded   9  Flonase 50 MCG/ACT SUSP; Therapy: (Recorded:19Ujk2362) to Recorded   10  FreeStyle Test In Vitro Strip; test BID; Dx:  E11 4; Therapy: 02CEB2595 to (Last Rx:48Qzn0904)  Requested for: 96Zlt3421 Ordered   11  Ibuprofen 600 MG Oral Tablet; TAKE 1 TABLET BY MOUTH 3 TIMES A DAY WITH FOOD  AS NEEDED; Therapy: 50LGV6920 to 77 873 135)  Requested for: 30Wjj3717; Last  Rx:87Ijs4795 Ordered   12  Magnesium Oxide 400 MG Oral Tablet; Therapy: (Recorded:23Sbm4475) to Recorded   13  Maxzide-25 TABS; 1/2 tab daily in AM Recorded   14   Mupirocin 2 % External Ointment; APPLY SPARINGLY TO AFFECTED AREA(S) TWICE  DAILY; Therapy: 21JCX5934 to (Last Rx:02Nov2016)  Requested for: 56PYG9697 Ordered   15  Olopatadine HCl - 0 1 % Ophthalmic Solution; INSTILL 1 DROP INTO AFFECTED  EYE(S) TWICE DAILY AS DIRECTED; Therapy: 92Pvb0575 to (Last Rx:10Vbz3257)  Requested for: 52Zim9881 Ordered   16  Patanol 0 1 % Ophthalmic Solution; INSTILL 1 DROP INTO AFFECTED EYE(S) TWICE  DAILY AS DIRECTED as needed Recorded   17  Potassium Chloride Ghislaine ER 20 MEQ Oral Tablet Extended Release; take 2 tablets twice  a day; Therapy: 68DYC9173 to (Last Rx:70Mhw7840)  Requested for: 41EJJ3788 Ordered   18  Simvastatin 20 MG Oral Tablet; 1 Every Day At Bedtime; Therapy: 62QUR7871 to (Last Adela Honey)  Requested for: 09Xsa8962 Ordered   19  Sulfamethoxazole-Trimethoprim 800-160 MG Oral Tablet; TAKE 1 TABLET TWICE  DAILY; Therapy: 83DEE7475 to (Evaluate:16Lmg8208)  Requested for: 90Mho7625; Last  Rx:84Bbd9829 Ordered   20  Triamterene-HCTZ 37 5-25 MG Oral Tablet; TAKE ONE-HALF (1/2) TABLET DAILY; Therapy: 18WTV1257 to (Last Rx:14Nov2017)  Requested for: 98KBF3537 Ordered   21  Viibryd 20 MG Oral Tablet; 1 every day; Therapy: 72DBV0273 to (Last Rx:01Mar2017)  Requested for: 58RRM2425 Ordered     The medication list was reviewed and updated today  Allergies  1  No Known Drug Allergies     Vitals        Physical Exam  Left Foot: Appearance: Normal except as noted: excessive pronation-- and-- pes planus  Great toe deformities include a bunion  Tenderness: None except the great toe  Right Foot: Appearance: Normal except as noted: excessive pronation-- and-- pes planus  Great toe deformities include a bunion  Tenderness: None except the great toe  Left Ankle: ROM: limited ROM in all planes Motor: diffuse weakness  Right Ankle: ROM: limited ROM in all planes Motor: diffuse weakness  Neurological Exam: performed  Light touch was decreased bilaterally   Vibratory sensation was decreased in both first metatarsophalangeal joints  Vascular Exam: performed Dorsalis pedis pulses were One/4 bilaterally  Posterior tibial pulses were One/4 bilaterally  Elevation Pallor: present bilaterally  Capillary refill time was greater than 3 seconds bilaterally-- and-- Q 9, findings bilateral  Negative digital hair  Edema: mild bilaterally-- and-- No Homans sign  Toenails: All of the toenails were elongated,-- hypertrophied,-- discolored,-- shown to have subungual debris-- and-- Mycotic  Socks and shoes removed, Right Foot Findings: erythematous and dry  Diminished tactile sensation with monofilament testing throughout the right foot  Socks and shoes removed, Left Foot Findings: erythematous and dry  Diminished tactile sensation with monofilament testing throughout the left foot  Capillary refills findings on the right were delayed in the toes  Pulses:  1+ in the posterior tibialis on the right  1+ in the dorsalis pedis on the right  Capillary refills findings on the left were delayed in the toes  Pulses:  1+ in the posterior tibialis on the left  1+ in the dorsalis pedis on the left  Assign Risk Category: 2: Loss of protective sensation with or without weakness, deformity, callus, pre-ulcer, or history of ulceration  High risk  Hyperkeratosis: present on both first toes,-- present on both first sub metatarsals-- and-- Pinch callus, bilateral    Shoe Gear Evaluation: performed ()  Right Foot: width: E -- and-- length: 12  Left Foot: width: E -- and-- length: 12  Recommendation(s): custom inlays  Procedure  Patient educated on care of the diabetic foot  To reduce callus formation, patient would benefit from diabetic inlays  HPI    The following portions of the patient's history were reviewed and updated as appropriate: allergies, current medications, past family history, past medical history, past social history, past surgical history and problem list     Review of Systems      Objective:      Foot ExamPhysical Exam

## 2018-02-27 DIAGNOSIS — E78.00 HYPERCHOLESTEROLEMIA: Primary | ICD-10-CM

## 2018-02-27 DIAGNOSIS — E11.42 DIABETIC POLYNEUROPATHY ASSOCIATED WITH TYPE 2 DIABETES MELLITUS (HCC): ICD-10-CM

## 2018-02-27 RX ORDER — SIMVASTATIN 20 MG
TABLET ORAL
Qty: 90 TABLET | Refills: 1 | Status: SHIPPED | OUTPATIENT
Start: 2018-02-27 | End: 2018-05-16 | Stop reason: SDUPTHER

## 2018-03-17 DIAGNOSIS — I10 BENIGN ESSENTIAL HYPERTENSION: Primary | ICD-10-CM

## 2018-03-18 RX ORDER — TRIAMTERENE AND HYDROCHLOROTHIAZIDE 37.5; 25 MG/1; MG/1
TABLET ORAL
Qty: 45 TABLET | Refills: 1 | Status: SHIPPED | OUTPATIENT
Start: 2018-03-18 | End: 2018-12-04 | Stop reason: SDUPTHER

## 2018-04-16 ENCOUNTER — OFFICE VISIT (OUTPATIENT)
Dept: PODIATRY | Facility: CLINIC | Age: 83
End: 2018-04-16
Payer: COMMERCIAL

## 2018-04-16 ENCOUNTER — OFFICE VISIT (OUTPATIENT)
Dept: FAMILY MEDICINE CLINIC | Facility: CLINIC | Age: 83
End: 2018-04-16
Payer: COMMERCIAL

## 2018-04-16 VITALS
HEIGHT: 71 IN | SYSTOLIC BLOOD PRESSURE: 138 MMHG | RESPIRATION RATE: 16 BRPM | BODY MASS INDEX: 32.06 KG/M2 | DIASTOLIC BLOOD PRESSURE: 78 MMHG | HEART RATE: 67 BPM | WEIGHT: 229 LBS

## 2018-04-16 VITALS
WEIGHT: 231 LBS | SYSTOLIC BLOOD PRESSURE: 134 MMHG | BODY MASS INDEX: 32.34 KG/M2 | HEIGHT: 71 IN | HEART RATE: 84 BPM | DIASTOLIC BLOOD PRESSURE: 80 MMHG | TEMPERATURE: 97.4 F | RESPIRATION RATE: 16 BRPM

## 2018-04-16 DIAGNOSIS — I10 BENIGN ESSENTIAL HYPERTENSION: ICD-10-CM

## 2018-04-16 DIAGNOSIS — I70.209 PERIPHERAL ARTERIOSCLEROSIS (HCC): Primary | ICD-10-CM

## 2018-04-16 DIAGNOSIS — F32.A MINOR DEPRESSION: ICD-10-CM

## 2018-04-16 DIAGNOSIS — E11.42 DIABETIC POLYNEUROPATHY ASSOCIATED WITH TYPE 2 DIABETES MELLITUS (HCC): ICD-10-CM

## 2018-04-16 DIAGNOSIS — M79.672 PAIN IN BOTH FEET: ICD-10-CM

## 2018-04-16 DIAGNOSIS — L84 CORNS: ICD-10-CM

## 2018-04-16 DIAGNOSIS — D48.5 NEOPLASM OF UNCERTAIN BEHAVIOR OF SKIN OF UPPER ARM: Primary | ICD-10-CM

## 2018-04-16 DIAGNOSIS — M79.671 PAIN IN BOTH FEET: ICD-10-CM

## 2018-04-16 DIAGNOSIS — E78.00 HYPERCHOLESTEROLEMIA: ICD-10-CM

## 2018-04-16 PROCEDURE — 3725F SCREEN DEPRESSION PERFORMED: CPT | Performed by: FAMILY MEDICINE

## 2018-04-16 PROCEDURE — 3008F BODY MASS INDEX DOCD: CPT | Performed by: FAMILY MEDICINE

## 2018-04-16 PROCEDURE — 11300 SHAVE SKIN LESION 0.5 CM/<: CPT | Performed by: FAMILY MEDICINE

## 2018-04-16 PROCEDURE — 3075F SYST BP GE 130 - 139MM HG: CPT | Performed by: FAMILY MEDICINE

## 2018-04-16 PROCEDURE — 1101F PT FALLS ASSESS-DOCD LE1/YR: CPT | Performed by: FAMILY MEDICINE

## 2018-04-16 PROCEDURE — 3079F DIAST BP 80-89 MM HG: CPT | Performed by: FAMILY MEDICINE

## 2018-04-16 PROCEDURE — 99214 OFFICE O/P EST MOD 30 MIN: CPT | Performed by: FAMILY MEDICINE

## 2018-04-16 PROCEDURE — 4040F PNEUMOC VAC/ADMIN/RCVD: CPT | Performed by: FAMILY MEDICINE

## 2018-04-16 PROCEDURE — 11056 PARNG/CUTG B9 HYPRKR LES 2-4: CPT | Performed by: PODIATRIST

## 2018-04-16 RX ORDER — VILAZODONE HYDROCHLORIDE 20 MG/1
20 TABLET ORAL DAILY
Qty: 90 TABLET | Refills: 0 | Status: SHIPPED | OUTPATIENT
Start: 2018-04-16 | End: 2018-09-17 | Stop reason: SDUPTHER

## 2018-04-16 RX ORDER — VILAZODONE HYDROCHLORIDE 20 MG/1
20 TABLET ORAL DAILY
Qty: 90 TABLET | Refills: 0 | Status: SHIPPED | OUTPATIENT
Start: 2018-04-16 | End: 2018-04-16 | Stop reason: SDUPTHER

## 2018-04-16 NOTE — PROGRESS NOTES
Assessment/Plan:    No problem-specific Assessment & Plan notes found for this encounter  There are no diagnoses linked to this encounter  Subjective:      History of Present Illness  HPI: Patient is a diabetic with pain in his feet with ambulation  No history of trauma  Patient is pre-ulcerative calluses  These hurt with ambulation       Review of Systems   Constitutional: feeling poorly, but-- as noted in HPI,-- no fever-- and-- no chills    ENT: nasal discharge, but-- as noted in HPI   Cardiovascular: no complaints of slow or fast heart rate, no chest pain, no palpitations, no leg claudication or lower extremity edema   Respiratory: cough, but-- as noted in HPI   Gastrointestinal: no complaints of abdominal pain, no constipation, no nausea or vomiting, no diarrhea or bloody stools   Genitourinary: no complaints of dysuria or incontinence, no hesitancy, no nocturia, no genital lesion, no inadequacy of penile erection   Musculoskeletal: no complaints of arthralgia, no myalgia, no joint swelling or stiffness, no limb pain or swelling   Integumentary: no complaints of skin rash or lesion, no itching or dry skin, no skin wounds       Active Problems  1  Abnormal EKG (794 31) (R94 31)   2  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)   3  Acute knee pain, right   4  Basal cell carcinoma of skin (173 91) (C44 91)   5  Benign essential hypertension (401 1) (I10)   6  BMI 31 0-31 9,adult (V85 31) (Z68 31)   7  Callus (700) (L84)   8  Cough (786 2) (R05)   9  Deformity of ankle and foot, acquired (736 70) (M21 969)   10  Diabetes mellitus with neuropathy (250 60,357 2) (E11 40)   11  Diabetic neuropathy (250 60,357 2) (E11 40)   12  Dizziness (780 4) (R42)   13  Encounter for prostate cancer screening (V76 44) (Z12 5)   14  Encounter for screening for cardiovascular disorders (V81 2) (Z13 6)   15  Encounter for screening for malignant neoplasm of colon (V76 51) (Z12 11)   16  Encounter for screening for malignant neoplasm of prostate (V76 44) (Z12 5)   17  Hypercholesterolemia (272 0) (E78 00)   18  Hypokalemia (276 8) (E87 6)   19  Limb pain (729 5) (M79 609)   20  Medicare annual wellness visit, initial (V70 0) (Z00 00)   21  Minor depression (311) (F32 9)   22  Need for vaccination (V05 9) (Z23)   23  Never a smoker   24  Non-healing ulcer (707 9) (L98 499)   25  Obesity, Class I, BMI 30-34 9 (278 00) (E66 9)   26  Onychomycosis (110 1) (B35 1)   27  Open wound of foot, left, subsequent encounter (V58 89,892 0) (S91 302D)   28  Pain in both feet (729 5) (M79 671,M79 672)   29  Primary dysthymia (300 4) (F34 1)   30  Screening for hypothyroidism (V77 0) (Z13 29)   31  Wound of skin (782 9) (R23 8)     Past Medical History   · History of Acute upper respiratory infection (465 9) (J06 9)   · History of Ascending cholangitis (576 1) (K83 0)   · History of Atherosclerosis of arteries of extremities (440 20) (I70 209)   · History of Bug bite with infection (919 5,E906 4) (W57 XXXA)   · History of Callus (700) (L84)   · History of Need for vaccination (V05 9) (Z23)   · Screening for genitourinary condition (V81 6) (Z13 89)   · History of Screening for genitourinary condition (V81 6) (Z13 89)     The active problems and past medical history were reviewed and updated today       Surgical History      · History of Appendectomy   · History of Cholecystectomy   · History of Open Treatment Of Fracture Of The Radial Shaft   · History of Tonsillectomy     The surgical history was reviewed and updated today        Family History  Mother    · Family history of Heart disease   · Family history of HTN (hypertension)   · Family history of Liver cancer   · Family history of Melanoma  Father    · Family history of    · Family history of Gastric ulcer  Brother    · Family history of Diabetes  Grandparent    · Family history of Diabetes     Social History      · Never a smoker  The social history was reviewed and updated today     Current Meds   1  Amlodipine Besy-Benazepril HCl - 5-20 MG Oral Capsule; take 1 capsule daily; Therapy: 57Ybw8367 to (Last Rx:16Oct2017)  Requested for: 16Oct2017 Ordered   2  Aspirin EC Low Dose 81 MG Oral Tablet Delayed Release; 1 every day; Therapy: 91Oar3799 to (Last Rx:45Tin2368) Ordered   3  Cheratussin -10 MG/5ML Oral Syrup; TAKE 5 - 10 ML EVERY 4 TO 6 HOURS AS NEEDED FOR COUGH; Therapy: 32Zjm6004 to (Evaluate:21Apr2017); Last Rx:59Aoc3151 Ordered   4  Cinnamon 500 MG Oral Tablet; Therapy: (Recorded:04Yzm3017) to Recorded   5  Coreg CR 20 MG Oral Capsule Extended Release 24 Hour; TAKE 1 CAPSULE EVERY MORNING WITH FOOD; Therapy: 78QKR4380 to (Last Rx:16Oct2017)  Requested for: 60XKQ2949 Ordered   6  Daily Multiple Vitamins Oral Tablet; Therapy: (Recorded:01Ami8155) to Recorded   7  Triny-C Oral Tablet; Therapy: (Recorded:04Mub9445) to Recorded   8  Fish Oil 1000 MG Oral Capsule Delayed Release; Therapy: (Recorded:02Nov2016) to Recorded   9  Flonase 50 MCG/ACT SUSP; Therapy: (Recorded:07Eql1770) to Recorded   10  FreeStyle Test In Vitro Strip; test BID;  Dx:  B44 3Durrell Miracle: 65FUG1334 to (Last Rx:83Kih4193)  Requested for: 37OSY1435 Ordered   11  Ibuprofen 600 MG Oral Tablet; TAKE 1 TABLET BY MOUTH 3 TIMES A DAY WITH FOOD  AS NEEDED;  Therapy: 85JCD2298 to (Evaluate:19Oct2017)  Requested for: 14Iae8709; Last  Rx:18Ctt1087 Ordered   12  Magnesium Oxide 400 MG Oral Tablet;  Therapy: (Recorded:49Wmf5337) to Recorded   13  Maxzide-25 TABS; 1/2 tab daily in AM Recorded   14  Mupirocin 2 % External Ointment; APPLY SPARINGLY TO AFFECTED AREA(S) TWICE Pennelope Kail: 87RHV5928 to (Last Rx:02Nov2016)  Requested for: 51TXS8757 Ordered   15  Olopatadine HCl - 0 1 % Ophthalmic Solution; INSTILL 1 DROP INTO AFFECTED  EYE(S) TWICE DAILY AS DIRECTED;  Therapy: 22VQH6143 to (Last Rx:17Apr2017)  Requested for: 17Apr2017 Ordered   16  Patanol 0 1 % Ophthalmic Solution; INSTILL 1 DROP INTO AFFECTED EYE(S) TWICE  DAILY AS DIRECTED as needed Recorded   17  Potassium Chloride Ghislaine ER 20 MEQ Oral Tablet Extended Release; take 2 tablets twice  a day;  Therapy: 39MGA5281 to (Last Rx:87Vwo2173)  Requested for: 08SJO0429 Ordered   18  Simvastatin 20 MG Oral Tablet; 1 Every Day At Bedtime;  Therapy: 08PYY5693 to (Last Rx:77Gws1043)  Requested for: 35Ehm1981 Ordered   19  Sulfamethoxazole-Trimethoprim 800-160 MG Oral Tablet; TAKE 1 TABLET TWICE [place]: 65IOJ1873 to (Evaluate:06Wze1170)  Requested for: 58Aek0336; Last  Rx:20Ckj4310 Ordered   20  Triamterene-HCTZ 37 5-25 MG Oral Tablet; TAKE ONE-HALF (1/2) TABLET DAILY;  Therapy: 61SNC6551 to (Last Rx:14Nov2017)  Requested for: 90WCX1856 Ordered   21  Viibryd 20 MG Oral Tablet; 1 every day; Marti Faes: 41LCQ2145 to (Last Rx:01Mar2017)  Requested for: 91EKW5638 Ordered     The medication list was reviewed and updated today       Allergies  1  No Known Drug Allergies     Vitals        Physical Exam  Left Foot: Appearance: Normal except as noted: excessive pronation-- and-- pes planus  Great toe deformities include a bunion  Tenderness: None except the great toe  Right Foot: Appearance: Normal except as noted: excessive pronation-- and-- pes planus  Great toe deformities include a bunion  Tenderness: None except the great toe  Left Ankle: ROM: limited ROM in all planes Motor: diffuse weakness  Right Ankle: ROM: limited ROM in all planes Motor: diffuse weakness  Neurological Exam: performed  Light touch was decreased bilaterally  Vibratory sensation was decreased in both first metatarsophalangeal joints  Vascular Exam: performed Dorsalis pedis pulses were One/4 bilaterally  Posterior tibial pulses were One/4 bilaterally  Elevation Pallor: present bilaterally  Capillary refill time was greater than 3 seconds bilaterally-- and-- Q 9, findings bilateral  Negative digital hair  Edema: mild bilaterally-- and-- No Homans sign  Toenails:  All of the toenails were elongated,-- hypertrophied,-- discolored,-- shown to have subungual debris-- and-- Mycotic     Socks and shoes removed, Right Foot Findings: erythematous and dry  Diminished tactile sensation with monofilament testing throughout the right foot   Socks and shoes removed, Left Foot Findings: erythematous and dry  Diminished tactile sensation with monofilament testing throughout the left foot  Capillary refills findings on the right were delayed in the toes   Pulses:  1+ in the posterior tibialis on the right  1+ in the dorsalis pedis on the right   Capillary refills findings on the left were delayed in the toes   Pulses:  1+ in the posterior tibialis on the left  1+ in the dorsalis pedis on the left   Assign Risk Category: 2: Loss of protective sensation with or without weakness, deformity, callus, pre-ulcer, or history of ulceration  High risk  Hyperkeratosis: present on both first toes,-- present on both first sub metatarsals-- and-- Pinch callus, bilateral    Shoe Gear Evaluation: performed ()  Right Foot: width: E -- and-- length: 12  Left Foot: width: E -- and-- length: 12  Recommendation(s): custom inlays       Procedure  Patient educated on care of the diabetic foot  To reduce callus formation, patient would benefit from diabetic inlays  Patient ID: Pilo Vaughan is a 81 y o  male  HPI    The following portions of the patient's history were reviewed and updated as appropriate: allergies, current medications, past family history, past medical history, past social history, past surgical history and problem list     Review of Systems      Objective: Foot Exam    Right Foot/Ankle     Inspection and Palpation  Skin Exam: callus and dry skin;     Neurovascular  Dorsalis pedis: 1+  Posterior tibial: 1+      Left Foot/Ankle      Inspection and Palpation  Skin Exam: callus and dry skin;     Neurovascular  Dorsalis pedis: 1+  Posterior tibial: 1+        Physical Exam   Cardiovascular: Pulses are weak pulses  Pulses:       Dorsalis pedis pulses are 1+ on the right side, and 1+ on the left side  Posterior tibial pulses are 1+ on the right side, and 1+ on the left side  Feet:   Right Foot:   Skin Integrity: Positive for callus and dry skin  Left Foot:   Skin Integrity: Positive for callus and dry skin  Patient's shoes and socks removed  Right Foot/Ankle   Right Foot Inspection  Skin Exam: dry skin, callus and callus                          Toe Exam: erythema  Sensory   Vibration: diminished  Proprioception: diminished   Monofilament testing: diminished  Vascular  Capillary refills: elevated  The right DP pulse is 1+  The right PT pulse is 1+  Left Foot/Ankle  Left Foot Inspection  Skin Exam: dry skin and callus                         Toe Exam: erythema                   Sensory   Vibration: diminished  Proprioception: diminished  Monofilament: diminished  Vascular  Capillary refills: elevated  The left DP pulse is 1+  The left PT pulse is 1+  Assign Risk Category:  Deformity present;  Loss of protective sensation; Weak pulses       Risk: 2

## 2018-04-16 NOTE — PROGRESS NOTES
Assessment/Plan:    Problem List Items Addressed This Visit     Diabetic polyneuropathy associated with type 2 diabetes mellitus ([facility] [address] )    Relevant Orders    Comprehensive metabolic panel    Lipid Panel with Direct LDL reflex    HEMOGLOBIN A1C W/ EAG ESTIMATION    CBC and differential    Microalbumin / creatinine urine ratio    Hypercholesterolemia    Relevant Orders    Comprehensive metabolic panel    Lipid Panel with Direct LDL reflex    HEMOGLOBIN A1C W/ EAG ESTIMATION    CBC and differential    Microalbumin / creatinine urine ratio    Benign essential hypertension    Relevant Orders    Comprehensive metabolic panel    Lipid Panel with Direct LDL reflex    HEMOGLOBIN A1C W/ EAG ESTIMATION    CBC and differential    Microalbumin / creatinine urine ratio    Minor depression    Relevant Medications    vilazodone (VIIBRYD) 20 mg tablet    Other Relevant Orders    Comprehensive metabolic panel    Lipid Panel with Direct LDL reflex    HEMOGLOBIN A1C W/ EAG ESTIMATION    CBC and differential    Microalbumin / creatinine urine ratio      Other Visit Diagnoses     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    -  Primary    Relevant Orders    Pathology Report          There are no Patient Instructions on file for this visit  Return for Next scheduled follow up  Subjective:      Patient ID: Jessica Carreon is a 81 y o  male  Chief Complaint   Patient presents with    lump on left arm     lj       Pt has a lesion on his lefty arm, that he has had a few months states the lsion has been chnaging slowly,  Getting bigger      Pt states while he is here he needs a refill of his viibryd    Pt also states he needs orders for his labs    Pt denies cp, no sob at this time  Denies polyuria no polydipsia  n chnages in vision        The following portions of the patient's history were reviewed and updated as appropriate: allergies, current medications, past family history, past medical history, past social history, past surgical history and problem list     Review of Systems   Constitutional: Negative for activity change, appetite change, chills, diaphoresis, fatigue, fever and unexpected weight change  HENT: Negative for congestion, dental problem, ear pain, mouth sores, sinus pain, sinus pressure, sore throat and trouble swallowing  Eyes: Negative for photophobia, discharge and itching  Respiratory: Negative for apnea, chest tightness and shortness of breath  Cardiovascular: Negative for chest pain, palpitations and leg swelling  Gastrointestinal: Negative for abdominal distention, abdominal pain, blood in stool, nausea and vomiting  Endocrine: Negative for cold intolerance, heat intolerance, polydipsia, polyphagia and polyuria  Genitourinary: Negative for difficulty urinating  Musculoskeletal: Negative for arthralgias  Skin: Negative for color change and wound  skkin lesion   Neurological: Negative for dizziness, syncope, speech difficulty and headaches  Hematological: Negative for adenopathy  Psychiatric/Behavioral: Negative for agitation and behavioral problems           Current Outpatient Prescriptions   Medication Sig Dispense Refill    amLODIPine-benazepril (LOTREL 5-20) 5-20 MG per capsule       aspirin (ASPIRIN LOW DOSE) 81 mg EC tablet Take 81 mg by mouth 3 (three) times a week        Bioflavonoid Products (ZULEYKA-C) 500-200-60 MG TABS Take by mouth      carvedilol (COREG CR) 20 MG 24 hr capsule       Cinnamon 500 MG TABS Take by mouth      fluticasone (FLONASE) 50 mcg/act nasal spray into each nostril      glucose blood (FREESTYLE LITE) test strip by In Vitro route      Magnesium Oxide 140 MG CAPS Take by mouth      Multiple Vitamins-Iron (DAILY MULTIPLE VITAMIN/IRON PO) Take by mouth      olopatadine (PATANOL) 0 1 % ophthalmic solution Apply 1 drop to eye 2 (two) times a day      Omega-3 Fatty Acids (FISH OIL) 1000 MG CPDR Take by mouth      potassium chloride (K-DUR,KLOR-CON) 20 mEq tablet Take 2 tablets by mouth 2 (two) times a day      simvastatin (ZOCOR) 20 mg tablet TAKE 1 TABLET DAILY AT  BEDTIME 90 tablet 1    triamterene-hydrochlorothiazide (MAXZIDE-25) 37 5-25 mg per tablet TAKE ONE-HALF (1/2) TABLET  DAILY 45 tablet 1    vilazodone (VIIBRYD) 20 mg tablet Take 1 tablet (20 mg total) by mouth daily 90 tablet 0    mupirocin (BACTROBAN) 2 % cream Apply topically 2 (two) times a day      sulfamethoxazole-trimethoprim (BACTRIM DS) 800-160 mg per tablet Take 1 tablet by mouth 2 (two) times a day       No current facility-administered medications for this visit  Objective:    /80   Pulse 84   Temp (!) 97 4 °F (36 3 °C)   Resp 16   Ht 5' 11" (1 803 m)   Wt 105 kg (231 lb)   BMI 32 22 kg/m²        Physical Exam       Shave lesion  Date/Time: 4/16/2018 4:39 PM  Performed by: Therese Meeks by: Tonny Stuart     Number of Lesions: 1  Lesion 1:     Body area: upper extremity    Upper extremity location: L upper arm    Initial size (mm): 4    Final defect size (mm): 4    Malignancy: malignancy unknown      Destruction method: shave removal      Comments:  Inflammed lesion present  3cc 2% lido with epi injected to the base of the lesion  Using 10 blade the lesion was excised     Pt tolerated procedure well  Verba consent obtained          Cain Gonzalez DO

## 2018-04-21 LAB
PATH REPORT.SITE OF ORIGIN SPEC: NORMAL
PAYMENT PROCEDURE: NORMAL
SL AMB .: NORMAL

## 2018-05-16 DIAGNOSIS — E78.00 HYPERCHOLESTEROLEMIA: ICD-10-CM

## 2018-05-16 DIAGNOSIS — E11.42 DIABETIC POLYNEUROPATHY ASSOCIATED WITH TYPE 2 DIABETES MELLITUS (HCC): ICD-10-CM

## 2018-05-16 RX ORDER — SIMVASTATIN 20 MG
TABLET ORAL
Qty: 90 TABLET | Refills: 3 | Status: SHIPPED | OUTPATIENT
Start: 2018-05-16 | End: 2019-03-23 | Stop reason: SDUPTHER

## 2018-05-25 ENCOUNTER — DOCUMENTATION (OUTPATIENT)
Dept: FAMILY MEDICINE CLINIC | Facility: CLINIC | Age: 83
End: 2018-05-25

## 2018-05-25 DIAGNOSIS — L03.114 CELLULITIS OF LEFT UPPER EXTREMITY: Primary | ICD-10-CM

## 2018-06-01 DIAGNOSIS — I10 BENIGN ESSENTIAL HYPERTENSION: Primary | ICD-10-CM

## 2018-06-04 DIAGNOSIS — I10 BENIGN ESSENTIAL HYPERTENSION: Primary | ICD-10-CM

## 2018-06-04 RX ORDER — AMLODIPINE BESYLATE AND BENAZEPRIL HYDROCHLORIDE 5; 20 MG/1; MG/1
CAPSULE ORAL
Qty: 90 CAPSULE | Refills: 1 | Status: SHIPPED | OUTPATIENT
Start: 2018-06-04 | End: 2018-11-16 | Stop reason: SDUPTHER

## 2018-06-04 RX ORDER — CARVEDILOL PHOSPHATE 20 MG/1
CAPSULE, EXTENDED RELEASE ORAL
Qty: 90 CAPSULE | Refills: 1 | Status: SHIPPED | OUTPATIENT
Start: 2018-06-04 | End: 2018-11-16 | Stop reason: SDUPTHER

## 2018-07-10 ENCOUNTER — OFFICE VISIT (OUTPATIENT)
Dept: PODIATRY | Facility: CLINIC | Age: 83
End: 2018-07-10
Payer: COMMERCIAL

## 2018-07-10 VITALS
HEIGHT: 71 IN | RESPIRATION RATE: 16 BRPM | BODY MASS INDEX: 32.34 KG/M2 | HEART RATE: 67 BPM | SYSTOLIC BLOOD PRESSURE: 138 MMHG | WEIGHT: 231 LBS | DIASTOLIC BLOOD PRESSURE: 76 MMHG

## 2018-07-10 DIAGNOSIS — M79.672 PAIN IN BOTH FEET: ICD-10-CM

## 2018-07-10 DIAGNOSIS — I70.209 PERIPHERAL ARTERIOSCLEROSIS (HCC): Primary | ICD-10-CM

## 2018-07-10 DIAGNOSIS — M79.671 PAIN IN BOTH FEET: ICD-10-CM

## 2018-07-10 DIAGNOSIS — L84 CORNS: ICD-10-CM

## 2018-07-10 DIAGNOSIS — E11.42 DIABETIC POLYNEUROPATHY ASSOCIATED WITH TYPE 2 DIABETES MELLITUS (HCC): ICD-10-CM

## 2018-07-10 DIAGNOSIS — I10 BENIGN ESSENTIAL HYPERTENSION: Primary | ICD-10-CM

## 2018-07-10 PROCEDURE — 11056 PARNG/CUTG B9 HYPRKR LES 2-4: CPT | Performed by: PODIATRIST

## 2018-07-10 RX ORDER — POTASSIUM CHLORIDE 20 MEQ/1
40 TABLET, EXTENDED RELEASE ORAL 2 TIMES DAILY
Qty: 360 TABLET | Refills: 1 | Status: SHIPPED | OUTPATIENT
Start: 2018-07-10 | End: 2018-12-05 | Stop reason: SDUPTHER

## 2018-07-10 NOTE — PROGRESS NOTES
Procedures   Foot Exam        Subjective:  patient, a diabetic white male, complains of pain in his feet with ambulation  No history of trauma     History of Present Illness  HPI: Patient is a diabetic with pain in his feet with ambulation  No history of trauma  Patient is pre-ulcerative calluses  These hurt with ambulation       Review of Systems   Constitutional: feeling poorly, but-- as noted in HPI,-- no fever-- and-- no chills    ENT: nasal discharge, but-- as noted in HPI   Cardiovascular: no complaints of slow or fast heart rate, no chest pain, no palpitations, no leg claudication or lower extremity edema   Respiratory: cough, but-- as noted in HPI   Gastrointestinal: no complaints of abdominal pain, no constipation, no nausea or vomiting, no diarrhea or bloody stools   Genitourinary: no complaints of dysuria or incontinence, no hesitancy, no nocturia, no genital lesion, no inadequacy of penile erection   Musculoskeletal: no complaints of arthralgia, no myalgia, no joint swelling or stiffness, no limb pain or swelling   Integumentary: no complaints of skin rash or lesion, no itching or dry skin, no skin wounds       Active Problems  1  Abnormal EKG (794 31) (R94 31)   2  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)   3  Acute knee pain, right   4  Basal cell carcinoma of skin (173 91) (C44 91)   5  Benign essential hypertension (401 1) (I10)   6  BMI 31 0-31 9,adult (V85 31) (Z68 31)   7  Callus (700) (L84)   8  Cough (786 2) (R05)   9  Deformity of ankle and foot, acquired (736 70) (M21 969)   10  Diabetes mellitus with neuropathy (250 60,357 2) (E11 40)   11  Diabetic neuropathy (250 60,357 2) (E11 40)   12  Dizziness (780 4) (R42)   13  Encounter for prostate cancer screening (V76 44) (Z12 5)   14  Encounter for screening for cardiovascular disorders (V81 2) (Z13 6)   15  Encounter for screening for malignant neoplasm of colon (V76 51) (Z12 11)   16  Encounter for screening for malignant neoplasm of prostate (V76 44) (Z12 5)   17  Hypercholesterolemia (272 0) (E78 00)   18  Hypokalemia (276 8) (E87 6)   19  Limb pain (729 5) (M79 609)   20  Medicare annual wellness visit, initial (V70 0) (Z00 00)   21  Minor depression (311) (F32 9)   22  Need for vaccination (V05 9) (Z23)   23  Never a smoker   24  Non-healing ulcer (707 9) (L98 499)   25  Obesity, Class I, BMI 30-34 9 (278 00) (E66 9)   26  Onychomycosis (110 1) (B35 1)   27  Open wound of foot, left, subsequent encounter (V58 89,892 0) (S91 302D)   28  Pain in both feet (729 5) (M79 671,M79 672)   29  Primary dysthymia (300 4) (F34 1)   30  Screening for hypothyroidism (V77 0) (Z13 29)   31  Wound of skin (782 9) (R23 8)     Past Medical History   · History of Acute upper respiratory infection (465 9) (J06 9)   · History of Ascending cholangitis (576 1) (K83 0)   · History of Atherosclerosis of arteries of extremities (440 20) (I70 209)   · History of Bug bite with infection (919 5,E906 4) (W57 XXXA)   · History of Callus (700) (L84)   · History of Need for vaccination (V05 9) (Z23)   · Screening for genitourinary condition (V81 6) (Z13 89)   · History of Screening for genitourinary condition (V81 6) (Z13 89)     The active problems and past medical history were reviewed and updated today       Surgical History      · History of Appendectomy   · History of Cholecystectomy   · History of Open Treatment Of Fracture Of The Radial Shaft   · History of Tonsillectomy     The surgical history was reviewed and updated today        Family History  Mother    · Family history of Heart disease   · Family history of HTN (hypertension)   · Family history of Liver cancer   · Family history of Melanoma  Father    · Family history of    · Family history of Gastric ulcer  Brother    · Family history of Diabetes  Grandparent    · Family history of Diabetes     Social History      · Never a smoker  The social history was reviewed and updated today       Current Meds   1  Amlodipine Besy-Benazepril HCl - 5-20 MG Oral Capsule; take 1 capsule daily; Therapy: 27Nob2353 to (Last Rx:16Oct2017)  Requested for: 16Oct2017 Ordered   2  Aspirin EC Low Dose 81 MG Oral Tablet Delayed Release; 1 every day; Therapy: 48Iew8205 to (Last Rx:13Gpu1100) Ordered   3  Cheratussin -10 MG/5ML Oral Syrup; TAKE 5 - 10 ML EVERY 4 TO 6 HOURS AS NEEDED FOR COUGH; Therapy: 32Hce5387 to (Evaluate:21Apr2017); Last Rx:56Ekz8930 Ordered   4  Cinnamon 500 MG Oral Tablet; Therapy: (Recorded:48Imh9592) to Recorded   5  Coreg CR 20 MG Oral Capsule Extended Release 24 Hour; TAKE 1 CAPSULE EVERY MORNING WITH FOOD; Therapy: 37HZT2430 to (Last Rx:16Oct2017)  Requested for: 67CJS9222 Ordered   6  Daily Multiple Vitamins Oral Tablet; Therapy: (Recorded:25Fnh4025) to Recorded   7  Triny-C Oral Tablet; Therapy: (Recorded:45Dki2612) to Recorded   8  Fish Oil 1000 MG Oral Capsule Delayed Release; Therapy: (Recorded:02Nov2016) to Recorded   9  Flonase 50 MCG/ACT SUSP; Therapy: (Recorded:97Wch1015) to Recorded   10  FreeStyle Test In Vitro Strip; test BID;  Dx:  O20 5Fonnie Jr: 30DPO3174 to (Last Rx:73Mpb5559)  Requested for: 37RUY6761 Ordered   11  Ibuprofen 600 MG Oral Tablet; TAKE 1 TABLET BY MOUTH 3 TIMES A DAY WITH FOOD  AS NEEDED;  Therapy: 47FMS9390 to (Evaluate:19Oct2017)  Requested for: 27Mjx8884; Last  Rx:33Ecb4331 Ordered   12  Magnesium Oxide 400 MG Oral Tablet;  Therapy: (Recorded:40Vsk7358) to Recorded   13  Maxzide-25 TABS; 1/2 tab daily in AM Recorded   14  Mupirocin 2 % External Ointment; APPLY SPARINGLY TO AFFECTED AREA(S) TWICE Kathaleen Sleek: 14QLJ4630 to (Last Rx:02Nov2016)  Requested for: 55WRV1941 Ordered   15  Olopatadine HCl - 0 1 % Ophthalmic Solution; INSTILL 1 DROP INTO AFFECTED  EYE(S) TWICE DAILY AS DIRECTED;  Therapy: 11IBH2271 to (Last Rx:17Apr2017)  Requested for: 17Apr2017 Ordered   16  Patanol 0 1 % Ophthalmic Solution; INSTILL 1 DROP INTO AFFECTED EYE(S) TWICE  DAILY AS DIRECTED as needed Recorded   17  Potassium Chloride Ghislaine ER 20 MEQ Oral Tablet Extended Release; take 2 tablets twice  a day; Climax Magnus: 08NBH3149 to (Last Rx:64Fmr4688)  Requested for: 04YBY4034 Ordered   18  Simvastatin 20 MG Oral Tablet; 1 Every Day At Bedtime;  Therapy: 79WYR2127 to (Last Rx:66Eej3626)  Requested for: 66Poh8891 Ordered   19  Sulfamethoxazole-Trimethoprim 800-160 MG Oral Tablet; TAKE 1 TABLET TWICE Kathaleen Sleek: 98FNN6737 to (Evaluate:71Twd8693)  Requested for: 30Xfd4388; Last  Rx:2017 Ordered   20  Triamterene-HCTZ 37 5-25 MG Oral Tablet; TAKE ONE-HALF (1/2) TABLET DAILY;  Therapy: 33WEW0637 to (Last Rx:2017)  Requested for: 44DTX7410 Ordered   21  Viibryd 20 MG Oral Tablet; 1 every day; Climax Magnus: 81KJB1766 to (Last Rx:2017)  Requested for: 91CIL8957 Ordered     The medication list was reviewed and updated today       Allergies  1  No Known Drug Allergies     Vitals        Physical Exam  Left Foot: Appearance: Normal except as noted: excessive pronation-- and-- pes planus  Great toe deformities include a bunion  Tenderness: None except the great toe  Right Foot: Appearance: Normal except as noted: excessive pronation-- and-- pes planus  Great toe deformities include a bunion  Tenderness: None except the great toe  Left Ankle: ROM: limited ROM in all planes Motor: diffuse weakness  Right Ankle: ROM: limited ROM in all planes Motor: diffuse weakness  Neurological Exam: performed  Light touch was decreased bilaterally  Vibratory sensation was decreased in both first metatarsophalangeal joints  Vascular Exam: performed Dorsalis pedis pulses were One/4 bilaterally  Posterior tibial pulses were One/4 bilaterally  Elevation Pallor: present bilaterally  Capillary refill time was greater than 3 seconds bilaterally-- and-- Q 9, findings bilateral  Negative digital hair  Edema: mild bilaterally-- and-- No Homans sign  Toenails:  All of the toenails were elongated,-- hypertrophied,-- discolored,-- shown to have subungual debris-- and-- Mycotic     Socks and shoes removed, Right Foot Findings: erythematous and dry  Diminished tactile sensation with monofilament testing throughout the right foot   Socks and shoes removed, Left Foot Findings: erythematous and dry  Diminished tactile sensation with monofilament testing throughout the left foot  Capillary refills findings on the right were delayed in the toes   Pulses:  1+ in the posterior tibialis on the right  1+ in the dorsalis pedis on the right   Capillary refills findings on the left were delayed in the toes   Pulses:  1+ in the posterior tibialis on the left  1+ in the dorsalis pedis on the left   Assign Risk Category: 2: Loss of protective sensation with or without weakness, deformity, callus, pre-ulcer, or history of ulceration  High risk  Hyperkeratosis: present on both first toes,-- present on both first sub metatarsals-- and-- Pinch callus, bilateral    Shoe Gear Evaluation: performed ()  Right Foot: width: E -- and-- length: 12  Left Foot: width: E -- and-- length: 12  Recommendation(s): custom inlays       Procedure  Patient educated on care of the diabetic foot  To reduce callus formation, patient would benefit from diabetic inlays  Patient ID: Radha Foster is a 81 y o  male         Objective:      Foot Exam     Right Foot/Ankle      Inspection and Palpation  Skin Exam: callus and dry skin;      Neurovascular  Dorsalis pedis: 1+  Posterior tibial: 1+        Left Foot/Ankle       Inspection and Palpation  Skin Exam: callus and dry skin;      Neurovascular  Dorsalis pedis: 1+  Posterior tibial: 1+        Physical Exam   Cardiovascular: Pulses are weak pulses  Pulses:       Dorsalis pedis pulses are 1+ on the right side, and 1+ on the left side  Posterior tibial pulses are 1+ on the right side, and 1+ on the left side  Feet:   Right Foot:   Skin Integrity: Positive for callus and dry skin     Left Foot:   Skin Integrity: Positive for callus and dry skin  Patient's shoes and socks removed  Right Foot/Ankle   Right Foot Inspection  Skin Exam: dry skin, callus and callus                           Toe Exam: erythema  Sensory   Vibration: diminished  Proprioception: diminished   Monofilament testing: diminished  Vascular  Capillary refills: elevated  The right DP pulse is 1+  The right PT pulse is 1+       Left Foot/Ankle  Left Foot Inspection  Skin Exam: dry skin and callus                          Toe Exam: erythema                   Sensory   Vibration: diminished  Proprioception: diminished  Monofilament: diminished  Vascular  Capillary refills: elevated  The left DP pulse is 1+  The left PT pulse is 1+  Assign Risk Category:  Deformity present;  Loss of protective sensation; Weak pulses       Risk: 2

## 2018-07-11 LAB
ALBUMIN SERPL-MCNC: 4.6 G/DL (ref 3.5–4.7)
ALBUMIN/CREAT UR: 4.3 MG/G CREAT (ref 0–30)
ALBUMIN/GLOB SERPL: 1.9 {RATIO} (ref 1.2–2.2)
ALP SERPL-CCNC: 54 IU/L (ref 39–117)
ALT SERPL-CCNC: 35 IU/L (ref 0–44)
AMBIG ABBREV DEFAULT: NORMAL
AST SERPL-CCNC: 27 IU/L (ref 0–40)
BASOPHILS # BLD AUTO: 0 X10E3/UL (ref 0–0.2)
BASOPHILS NFR BLD AUTO: 1 %
BILIRUB SERPL-MCNC: 0.7 MG/DL (ref 0–1.2)
BUN SERPL-MCNC: 18 MG/DL (ref 8–27)
BUN/CREAT SERPL: 13 (ref 10–24)
CALCIUM SERPL-MCNC: 9.5 MG/DL (ref 8.6–10.2)
CHLORIDE SERPL-SCNC: 101 MMOL/L (ref 96–106)
CHOLEST SERPL-MCNC: 143 MG/DL (ref 100–199)
CO2 SERPL-SCNC: 23 MMOL/L (ref 20–29)
CREAT SERPL-MCNC: 1.41 MG/DL (ref 0.76–1.27)
CREAT UR-MCNC: 93 MG/DL
EOSINOPHIL # BLD AUTO: 0.2 X10E3/UL (ref 0–0.4)
EOSINOPHIL NFR BLD AUTO: 4 %
ERYTHROCYTE [DISTWIDTH] IN BLOOD BY AUTOMATED COUNT: 13.4 % (ref 12.3–15.4)
EST. AVERAGE GLUCOSE BLD GHB EST-MCNC: 154 MG/DL
GLOBULIN SER-MCNC: 2.4 G/DL (ref 1.5–4.5)
GLUCOSE SERPL-MCNC: 135 MG/DL (ref 65–99)
HBA1C MFR BLD: 7 % (ref 4.8–5.6)
HCT VFR BLD AUTO: 44.1 % (ref 37.5–51)
HDLC SERPL-MCNC: 45 MG/DL
HGB BLD-MCNC: 15 G/DL (ref 13–17.7)
IMM GRANULOCYTES # BLD: 0 X10E3/UL (ref 0–0.1)
IMM GRANULOCYTES NFR BLD: 0 %
LDLC SERPL CALC-MCNC: 57 MG/DL (ref 0–99)
LYMPHOCYTES # BLD AUTO: 1.7 X10E3/UL (ref 0.7–3.1)
LYMPHOCYTES NFR BLD AUTO: 29 %
MCH RBC QN AUTO: 31.2 PG (ref 26.6–33)
MCHC RBC AUTO-ENTMCNC: 34 G/DL (ref 31.5–35.7)
MCV RBC AUTO: 92 FL (ref 79–97)
MICROALBUMIN UR-MCNC: 4 UG/ML
MICRODELETION SYND BLD/T FISH: NORMAL
MICRODELETION SYND BLD/T FISH: NORMAL
MONOCYTES # BLD AUTO: 0.5 X10E3/UL (ref 0.1–0.9)
MONOCYTES NFR BLD AUTO: 9 %
NEUTROPHILS # BLD AUTO: 3.3 X10E3/UL (ref 1.4–7)
NEUTROPHILS NFR BLD AUTO: 57 %
PLATELET # BLD AUTO: 158 X10E3/UL (ref 150–379)
POTASSIUM SERPL-SCNC: 4.4 MMOL/L (ref 3.5–5.2)
PROT SERPL-MCNC: 7 G/DL (ref 6–8.5)
RBC # BLD AUTO: 4.81 X10E6/UL (ref 4.14–5.8)
SL AMB EGFR AFRICAN AMERICAN: 53 ML/MIN/1.73
SL AMB EGFR NON AFRICAN AMERICAN: 46 ML/MIN/1.73
SL AMB PDF IMAGE: NORMAL
SODIUM SERPL-SCNC: 142 MMOL/L (ref 134–144)
TRIGL SERPL-MCNC: 204 MG/DL (ref 0–149)
WBC # BLD AUTO: 5.8 X10E3/UL (ref 3.4–10.8)

## 2018-08-03 DIAGNOSIS — N30.00 ACUTE CYSTITIS WITHOUT HEMATURIA: Primary | ICD-10-CM

## 2018-08-03 RX ORDER — SULFAMETHOXAZOLE AND TRIMETHOPRIM 800; 160 MG/1; MG/1
1 TABLET ORAL EVERY 12 HOURS SCHEDULED
Qty: 14 TABLET | Refills: 0 | Status: SHIPPED | OUTPATIENT
Start: 2018-08-03 | End: 2018-08-10

## 2018-08-16 LAB
LEFT EYE DIABETIC RETINOPATHY: NORMAL
RIGHT EYE DIABETIC RETINOPATHY: NORMAL

## 2018-09-17 DIAGNOSIS — F32.A MINOR DEPRESSION: ICD-10-CM

## 2018-09-17 RX ORDER — VILAZODONE HYDROCHLORIDE 20 MG/1
TABLET ORAL
Qty: 90 TABLET | Refills: 1 | Status: SHIPPED | OUTPATIENT
Start: 2018-09-17 | End: 2019-09-22 | Stop reason: SDUPTHER

## 2018-09-19 ENCOUNTER — OFFICE VISIT (OUTPATIENT)
Dept: PODIATRY | Facility: CLINIC | Age: 83
End: 2018-09-19
Payer: COMMERCIAL

## 2018-09-19 VITALS
RESPIRATION RATE: 17 BRPM | BODY MASS INDEX: 32.34 KG/M2 | WEIGHT: 231 LBS | DIASTOLIC BLOOD PRESSURE: 69 MMHG | HEIGHT: 71 IN | HEART RATE: 66 BPM | SYSTOLIC BLOOD PRESSURE: 137 MMHG

## 2018-09-19 DIAGNOSIS — M79.672 PAIN IN BOTH FEET: ICD-10-CM

## 2018-09-19 DIAGNOSIS — E11.42 DIABETIC POLYNEUROPATHY ASSOCIATED WITH TYPE 2 DIABETES MELLITUS (HCC): ICD-10-CM

## 2018-09-19 DIAGNOSIS — I70.209 PERIPHERAL ARTERIOSCLEROSIS (HCC): Primary | ICD-10-CM

## 2018-09-19 DIAGNOSIS — M79.671 PAIN IN BOTH FEET: ICD-10-CM

## 2018-09-19 DIAGNOSIS — L84 CORNS: ICD-10-CM

## 2018-09-19 PROCEDURE — 11056 PARNG/CUTG B9 HYPRKR LES 2-4: CPT | Performed by: PODIATRIST

## 2018-09-19 NOTE — PROGRESS NOTES
Procedures   Foot Exam     Subjective:  patient, a diabetic white male, complains of pain in his feet with ambulation  No history of trauma     History of Present Illness  HPI: Patient is a diabetic with pain in his feet with ambulation  No history of trauma  Patient is pre-ulcerative calluses  These hurt with ambulation       Review of Systems   Constitutional: feeling poorly, but-- as noted in HPI,-- no fever-- and-- no chills    ENT: nasal discharge, but-- as noted in HPI   Cardiovascular: no complaints of slow or fast heart rate, no chest pain, no palpitations, no leg claudication or lower extremity edema   Respiratory: cough, but-- as noted in HPI   Gastrointestinal: no complaints of abdominal pain, no constipation, no nausea or vomiting, no diarrhea or bloody stools   Genitourinary: no complaints of dysuria or incontinence, no hesitancy, no nocturia, no genital lesion, no inadequacy of penile erection   Musculoskeletal: no complaints of arthralgia, no myalgia, no joint swelling or stiffness, no limb pain or swelling   Integumentary: no complaints of skin rash or lesion, no itching or dry skin, no skin wounds       Active Problems  1  Abnormal EKG (794 31) (R94 31)   2  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)   3  Acute knee pain, right   4  Basal cell carcinoma of skin (173 91) (C44 91)   5  Benign essential hypertension (401 1) (I10)   6  BMI 31 0-31 9,adult (V85 31) (Z68 31)   7  Callus (700) (L84)   8  Cough (786 2) (R05)   9  Deformity of ankle and foot, acquired (736 70) (M21 969)   10  Diabetes mellitus with neuropathy (250 60,357 2) (E11 40)   11  Diabetic neuropathy (250 60,357 2) (E11 40)   12  Dizziness (780 4) (R42)   13  Encounter for prostate cancer screening (V76 44) (Z12 5)   14  Encounter for screening for cardiovascular disorders (V81 2) (Z13 6)   15  Encounter for screening for malignant neoplasm of colon (V76 51) (Z12 11)   16  Encounter for screening for malignant neoplasm of prostate (V76 44) (Z12 5)   17  Hypercholesterolemia (272 0) (E78 00)   18  Hypokalemia (276 8) (E87 6)   19  Limb pain (729 5) (M79 609)   20  Medicare annual wellness visit, initial (V70 0) (Z00 00)   21  Minor depression (311) (F32 9)   22  Need for vaccination (V05 9) (Z23)   23  Never a smoker   24  Non-healing ulcer (707 9) (L98 499)   25  Obesity, Class I, BMI 30-34 9 (278 00) (E66 9)   26  Onychomycosis (110 1) (B35 1)   27  Open wound of foot, left, subsequent encounter (V58 89,892 0) (S91 302D)   28  Pain in both feet (729 5) (M79 671,M79 672)   29  Primary dysthymia (300 4) (F34 1)   30  Screening for hypothyroidism (V77 0) (Z13 29)   31  Wound of skin (782 9) (R23 8)     Past Medical History   · History of Acute upper respiratory infection (465 9) (J06 9)   · History of Ascending cholangitis (576 1) (K83 0)   · History of Atherosclerosis of arteries of extremities (440 20) (I70 209)   · History of Bug bite with infection (919 5,E906 4) (W57 XXXA)   · History of Callus (700) (L84)   · History of Need for vaccination (V05 9) (Z23)   · Screening for genitourinary condition (V81 6) (Z13 89)   · History of Screening for genitourinary condition (V81 6) (Z13 89)     The active problems and past medical history were reviewed and updated today       Surgical History      · History of Appendectomy   · History of Cholecystectomy   · History of Open Treatment Of Fracture Of The Radial Shaft   · History of Tonsillectomy     The surgical history was reviewed and updated today        Family History  Mother    · Family history of Heart disease   · Family history of HTN (hypertension)   · Family history of Liver cancer   · Family history of Melanoma  Father    · Family history of    · Family history of Gastric ulcer  Brother    · Family history of Diabetes  Grandparent    · Family history of Diabetes     Social History      · Never a smoker  The social history was reviewed and updated today       Current Meds   1  Amlodipine Besy-Benazepril HCl - 5-20 MG Oral Capsule; take 1 capsule daily; Therapy: 76Ydd6119 to (Last Rx:16Oct2017)  Requested for: 16Oct2017 Ordered   2  Aspirin EC Low Dose 81 MG Oral Tablet Delayed Release; 1 every day; Therapy: 22Hkk0835 to (Last Rx:37Ewi3976) Ordered   3  Cheratussin -10 MG/5ML Oral Syrup; TAKE 5 - 10 ML EVERY 4 TO 6 HOURS AS NEEDED FOR COUGH; Therapy: 85Lws6236 to (Evaluate:21Apr2017); Last Rx:84Lms2587 Ordered   4  Cinnamon 500 MG Oral Tablet; Therapy: (Recorded:46Ezf6409) to Recorded   5  Coreg CR 20 MG Oral Capsule Extended Release 24 Hour; TAKE 1 CAPSULE EVERY MORNING WITH FOOD; Therapy: 12FRA5263 to (Last Rx:16Oct2017)  Requested for: 33FDP7438 Ordered   6  Daily Multiple Vitamins Oral Tablet; Therapy: (Recorded:14Dsx6870) to Recorded   7  Triny-C Oral Tablet; Therapy: (Recorded:65Fvn3555) to Recorded   8  Fish Oil 1000 MG Oral Capsule Delayed Release; Therapy: (Recorded:02Nov2016) to Recorded   9  Flonase 50 MCG/ACT SUSP; Therapy: (Recorded:55Fqd3913) to Recorded   10  FreeStyle Test In Vitro Strip; test BID;  Dx:  Z08 5Janeth Pert: 46QWX7291 to (Last Rx:53Nvy2278)  Requested for: 24YCM1339 Ordered   11  Ibuprofen 600 MG Oral Tablet; TAKE 1 TABLET BY MOUTH 3 TIMES A DAY WITH FOOD  AS NEEDED;  Therapy: 13CPO2837 to (Evaluate:19Oct2017)  Requested for: 69Voq3433; Last  Rx:81Ljn1408 Ordered   12  Magnesium Oxide 400 MG Oral Tablet;  Therapy: (Recorded:07Dnd7095) to Recorded   13  Maxzide-25 TABS; 1/2 tab daily in AM Recorded   14  Mupirocin 2 % External Ointment; APPLY SPARINGLY TO AFFECTED AREA(S) TWICE Debra Concetta: 07DNV1101 to (Last Rx:02Nov2016)  Requested for: 55HPI9203 Ordered   15  Olopatadine HCl - 0 1 % Ophthalmic Solution; INSTILL 1 DROP INTO AFFECTED  EYE(S) TWICE DAILY AS DIRECTED;  Therapy: 62MEE2701 to (Last Rx:17Apr2017)  Requested for: 17Apr2017 Ordered   16  Patanol 0 1 % Ophthalmic Solution; INSTILL 1 DROP INTO AFFECTED EYE(S) TWICE  DAILY AS DIRECTED as needed Recorded   17  Potassium Chloride Ghislaine ER 20 MEQ Oral Tablet Extended Release; take 2 tablets twice  a day; Bailee Muñiz: 69NOG8545 to (Last Rx:09Jul2017)  Requested for: 70WSV6987 Ordered   18  Simvastatin 20 MG Oral Tablet; 1 Every Day At Bedtime;  Therapy: 03JQD7298 to (Last Rx:22Aug2017)  Requested for: 10Orm7746 Ordered   19  Sulfamethoxazole-Trimethoprim 800-160 MG Oral Tablet; TAKE 1 TABLET TWICE [employer]: 19LAI9935 to (Evaluate:06Sxi1378)  Requested for: 21Qdy6080; Last  Rx:04Aug2017 Ordered   20  Triamterene-HCTZ 37 5-25 MG Oral Tablet; TAKE ONE-HALF (1/2) TABLET DAILY;  Therapy: 37RHS9281 to (Last Rx:14Nov2017)  Requested for: 77IWV9494 Ordered   21  Viibryd 20 MG Oral Tablet; 1 every day; Bailee Muñiz: 14SYR0473 to (Last Rx:01Mar2017)  Requested for: 41QHZ2554 Ordered     The medication list was reviewed and updated today       Allergies  1  No Known Drug Allergies     Vitals        Physical Exam  Left Foot: Appearance: Normal except as noted: excessive pronation-- and-- pes planus  Great toe deformities include a bunion  Tenderness: None except the great toe  Right Foot: Appearance: Normal except as noted: excessive pronation-- and-- pes planus  Great toe deformities include a bunion  Tenderness: None except the great toe  Left Ankle: ROM: limited ROM in all planes Motor: diffuse weakness  Right Ankle: ROM: limited ROM in all planes Motor: diffuse weakness  Neurological Exam: performed  Light touch was decreased bilaterally  Vibratory sensation was decreased in both first metatarsophalangeal joints  Vascular Exam: performed Dorsalis pedis pulses were One/4 bilaterally  Posterior tibial pulses were One/4 bilaterally  Elevation Pallor: present bilaterally  Capillary refill time was greater than 3 seconds bilaterally-- and-- Q 9, findings bilateral  Negative digital hair  Edema: mild bilaterally-- and-- No Homans sign  Toenails:  All of the toenails were elongated,-- hypertrophied,-- discolored,-- shown to have subungual debris-- and-- Mycotic     Socks and shoes removed, Right Foot Findings: erythematous and dry  Diminished tactile sensation with monofilament testing throughout the right foot   Socks and shoes removed, Left Foot Findings: erythematous and dry  Diminished tactile sensation with monofilament testing throughout the left foot  Capillary refills findings on the right were delayed in the toes   Pulses:  1+ in the posterior tibialis on the right  1+ in the dorsalis pedis on the right   Capillary refills findings on the left were delayed in the toes   Pulses:  1+ in the posterior tibialis on the left  1+ in the dorsalis pedis on the left   Assign Risk Category: 2: Loss of protective sensation with or without weakness, deformity, callus, pre-ulcer, or history of ulceration  High risk  Hyperkeratosis: present on both first toes,-- present on both first sub metatarsals-- and-- Pinch callus, bilateral    Shoe Gear Evaluation: performed ()  Right Foot: width: E -- and-- length: 12  Left Foot: width: E -- and-- length: 12  Recommendation(s): custom inlays       Procedure  Patient educated on care of the diabetic foot  To reduce callus formation, patient would benefit from diabetic inlays     Patient ID: Danny Beckham is a 81 y  o  male         Objective:      Foot Exam     Right Foot/Ankle      Inspection and Palpation  Skin Exam: callus and dry skin;      Neurovascular  Dorsalis pedis: 1+  Posterior tibial: 1+        Left Foot/Ankle       Inspection and Palpation  Skin Exam: callus and dry skin;      Neurovascular  Dorsalis pedis: 1+  Posterior tibial: 1+        Physical Exam   Cardiovascular: Pulses are weak pulses  Pulses:       Dorsalis pedis pulses are 1+ on the right side, and 1+ on the left side         Posterior tibial pulses are 1+ on the right side, and 1+ on the left side  Feet:   Right Foot:   Skin Integrity: Positive for callus and dry skin     Left Foot:   Skin Integrity: Positive for callus and dry skin  Patient's shoes and socks removed  Right Foot/Ankle   Right Foot Inspection  Skin Exam: dry skin, callus and callus               Toe Exam: erythema  Sensory   Vibration: diminished  Proprioception: diminished   Monofilament testing: diminished  Vascular  Capillary refills: elevated  The right DP pulse is 1+  The right PT pulse is 1+       Left Foot/Ankle  Left Foot Inspection  Skin Exam: dry skin and callus              Toe Exam: erythema                   Sensory   Vibration: diminished  Proprioception: diminished  Monofilament: diminished  Vascular  Capillary refills: elevated  The left DP pulse is 1+  The left PT pulse is 1+  Assign Risk Category:  Deformity present;  Loss of protective sensation; Weak pulses       Risk: 2

## 2018-11-16 DIAGNOSIS — I10 BENIGN ESSENTIAL HYPERTENSION: ICD-10-CM

## 2018-11-19 RX ORDER — AMLODIPINE BESYLATE AND BENAZEPRIL HYDROCHLORIDE 5; 20 MG/1; MG/1
CAPSULE ORAL
Qty: 90 CAPSULE | Refills: 0 | Status: SHIPPED | OUTPATIENT
Start: 2018-11-19 | End: 2019-04-23 | Stop reason: SDUPTHER

## 2018-11-19 RX ORDER — CARVEDILOL PHOSPHATE 20 MG/1
CAPSULE, EXTENDED RELEASE ORAL
Qty: 90 CAPSULE | Refills: 0 | Status: SHIPPED | OUTPATIENT
Start: 2018-11-19 | End: 2019-04-23 | Stop reason: SDUPTHER

## 2018-11-28 ENCOUNTER — OFFICE VISIT (OUTPATIENT)
Dept: PODIATRY | Facility: CLINIC | Age: 83
End: 2018-11-28
Payer: COMMERCIAL

## 2018-11-28 VITALS
DIASTOLIC BLOOD PRESSURE: 87 MMHG | HEIGHT: 71 IN | SYSTOLIC BLOOD PRESSURE: 136 MMHG | HEART RATE: 69 BPM | WEIGHT: 231 LBS | BODY MASS INDEX: 32.34 KG/M2

## 2018-11-28 DIAGNOSIS — L84 CORNS: ICD-10-CM

## 2018-11-28 DIAGNOSIS — M79.671 PAIN IN BOTH FEET: Primary | ICD-10-CM

## 2018-11-28 DIAGNOSIS — E11.42 DIABETIC POLYNEUROPATHY ASSOCIATED WITH TYPE 2 DIABETES MELLITUS (HCC): ICD-10-CM

## 2018-11-28 DIAGNOSIS — I70.209 PERIPHERAL ARTERIOSCLEROSIS (HCC): ICD-10-CM

## 2018-11-28 DIAGNOSIS — M79.672 PAIN IN BOTH FEET: Primary | ICD-10-CM

## 2018-11-28 PROCEDURE — 11056 PARNG/CUTG B9 HYPRKR LES 2-4: CPT | Performed by: PODIATRIST

## 2018-11-28 NOTE — PROGRESS NOTES
Procedures   Foot Exam        Subjective:  patient, a diabetic white male, complains of pain in his feet with ambulation   No history of trauma     History of Present Illness  HPI: Patient is a diabetic with pain in his feet with ambulation  No history of trauma  Patient is pre-ulcerative calluses  These hurt with ambulation       Review of Systems   Constitutional: feeling poorly, but-- as noted in HPI,-- no fever-- and-- no chills    ENT: nasal discharge, but-- as noted in HPI   Cardiovascular: no complaints of slow or fast heart rate, no chest pain, no palpitations, no leg claudication or lower extremity edema   Respiratory: cough, but-- as noted in HPI   Gastrointestinal: no complaints of abdominal pain, no constipation, no nausea or vomiting, no diarrhea or bloody stools   Genitourinary: no complaints of dysuria or incontinence, no hesitancy, no nocturia, no genital lesion, no inadequacy of penile erection   Musculoskeletal: no complaints of arthralgia, no myalgia, no joint swelling or stiffness, no limb pain or swelling   Integumentary: no complaints of skin rash or lesion, no itching or dry skin, no skin wounds       Active Problems  1  Abnormal EKG (794 31) (R94 31)   2  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)   3  Acute knee pain, right   4  Basal cell carcinoma of skin (173 91) (C44 91)   5  Benign essential hypertension (401 1) (I10)   6  BMI 31 0-31 9,adult (V85 31) (Z68 31)   7  Callus (700) (L84)   8  Cough (786 2) (R05)   9  Deformity of ankle and foot, acquired (736 70) (M21 969)   10  Diabetes mellitus with neuropathy (250 60,357 2) (E11 40)   11  Diabetic neuropathy (250 60,357 2) (E11 40)   12  Dizziness (780 4) (R42)   13  Encounter for prostate cancer screening (V76 44) (Z12 5)   14  Encounter for screening for cardiovascular disorders (V81 2) (Z13 6)   15  Encounter for screening for malignant neoplasm of colon (V76 51) (Z12 11)   16  Encounter for screening for malignant neoplasm of prostate (V76 44) (Z12 5)   17  Hypercholesterolemia (272 0) (E78 00)   18  Hypokalemia (276 8) (E87 6)   19  Limb pain (729 5) (M79 609)   20  Medicare annual wellness visit, initial (V70 0) (Z00 00)   21  Minor depression (311) (F32 9)   22  Need for vaccination (V05 9) (Z23)   23  Never a smoker   24  Non-healing ulcer (707 9) (L98 499)   25  Obesity, Class I, BMI 30-34 9 (278 00) (E66 9)   26  Onychomycosis (110 1) (B35 1)   27  Open wound of foot, left, subsequent encounter (V58 89,892 0) (S91 302D)   28  Pain in both feet (729 5) (M79 671,M79 672)   29  Primary dysthymia (300 4) (F34 1)   30  Screening for hypothyroidism (V77 0) (Z13 29)   31  Wound of skin (782 9) (R23 8)     Past Medical History   · History of Acute upper respiratory infection (465 9) (J06 9)   · History of Ascending cholangitis (576 1) (K83 0)   · History of Atherosclerosis of arteries of extremities (440 20) (I70 209)   · History of Bug bite with infection (919 5,E906 4) (W57 XXXA)   · History of Callus (700) (L84)   · History of Need for vaccination (V05 9) (Z23)   · Screening for genitourinary condition (V81 6) (Z13 89)   · History of Screening for genitourinary condition (V81 6) (Z13 89)     The active problems and past medical history were reviewed and updated today       Surgical History      · History of Appendectomy   · History of Cholecystectomy   · History of Open Treatment Of Fracture Of The Radial Shaft   · History of Tonsillectomy     The surgical history was reviewed and updated today        Family History  Mother    · Family history of Heart disease   · Family history of HTN (hypertension)   · Family history of Liver cancer   · Family history of Melanoma  Father    · Family history of    · Family history of Gastric ulcer  Brother    · Family history of Diabetes  Grandparent    · Family history of Diabetes     Social History      · Never a smoker  The social history was reviewed and updated today       Current Meds   1  Amlodipine Besy-Benazepril HCl - 5-20 MG Oral Capsule; take 1 capsule daily; Therapy: 46Qhj4932 to (Last Rx:16Oct2017)  Requested for: 16Oct2017 Ordered   2  Aspirin EC Low Dose 81 MG Oral Tablet Delayed Release; 1 every day; Therapy: 13Cdu0926 to (Last Rx:49Cej7166) Ordered   3  Cheratussin -10 MG/5ML Oral Syrup; TAKE 5 - 10 ML EVERY 4 TO 6 HOURS AS NEEDED FOR COUGH; Therapy: 37Vin1420 to (Evaluate:21Apr2017); Last Rx:86Xzs3071 Ordered   4  Cinnamon 500 MG Oral Tablet; Therapy: (Recorded:84Buv8125) to Recorded   5  Coreg CR 20 MG Oral Capsule Extended Release 24 Hour; TAKE 1 CAPSULE EVERY MORNING WITH FOOD; Therapy: 85JKS1802 to (Last Rx:16Oct2017)  Requested for: 20QGJ0565 Ordered   6  Daily Multiple Vitamins Oral Tablet; Therapy: (Recorded:55Ftd9104) to Recorded   7  Triny-C Oral Tablet; Therapy: (Recorded:03Crb6488) to Recorded   8  Fish Oil 1000 MG Oral Capsule Delayed Release; Therapy: (Recorded:02Nov2016) to Recorded   9  Flonase 50 MCG/ACT SUSP; Therapy: (Recorded:96Xif5707) to Recorded   10  FreeStyle Test In Vitro Strip; test BID;  Dx:  C96 5Erik Hodgkins: 52PQY3594 to (Last Rx:59Lyu3104)  Requested for: 94TYU9150 Ordered   11  Ibuprofen 600 MG Oral Tablet; TAKE 1 TABLET BY MOUTH 3 TIMES A DAY WITH FOOD  AS NEEDED;  Therapy: 71SAR1508 to (Evaluate:19Oct2017)  Requested for: 47Ejz2675; Last  Rx:52Hwq6012 Ordered   12  Magnesium Oxide 400 MG Oral Tablet;  Therapy: (Recorded:00Zib8067) to Recorded   13  Maxzide-25 TABS; 1/2 tab daily in AM Recorded   14  Mupirocin 2 % External Ointment; APPLY SPARINGLY TO AFFECTED AREA(S) TWICE Kasey East: 77QDB2535 to (Last Rx:02Nov2016)  Requested for: 29XRG0712 Ordered   15  Olopatadine HCl - 0 1 % Ophthalmic Solution; INSTILL 1 DROP INTO AFFECTED  EYE(S) TWICE DAILY AS DIRECTED;  Therapy: 90IOY8735 to (Last Rx:17Apr2017)  Requested for: 17Apr2017 Ordered   16  Patanol 0 1 % Ophthalmic Solution; INSTILL 1 DROP INTO AFFECTED EYE(S) TWICE  DAILY AS DIRECTED as needed Recorded   17  Potassium Chloride Ghislaine ER 20 MEQ Oral Tablet Extended Release; take 2 tablets twice  a day; Noelle Post: 10AGE9634 to (Last Rx:75Kwk1071)  Requested for: 16VUE6601 Ordered   18  Simvastatin 20 MG Oral Tablet; 1 Every Day At Bedtime;  Therapy: 58FJW7009 to (Last Rx:80Oyl0752)  Requested for: 79Ajz3452 Ordered   19  Sulfamethoxazole-Trimethoprim 800-160 MG Oral Tablet; TAKE 1 TABLET TWICE Jenna Elms: 78RZA9706 to (Evaluate:68Xqu4431)  Requested for: 56Dve7821; Last  Rx:04Aug2017 Ordered   20  Triamterene-HCTZ 37 5-25 MG Oral Tablet; TAKE ONE-HALF (1/2) TABLET DAILY;  Therapy: 28GUE8457 to (Last Rx:14Nov2017)  Requested for: 97KES3619 Ordered   21  Viibryd 20 MG Oral Tablet; 1 every day; Noelle Post: 62QKU9313 to (Last Rx:01Mar2017)  Requested for: 14UTM9512 Ordered     The medication list was reviewed and updated today       Allergies  1  No Known Drug Allergies     Vitals        Physical Exam  Left Foot: Appearance: Normal except as noted: excessive pronation-- and-- pes planus  Great toe deformities include a bunion  Tenderness: None except the great toe  Right Foot: Appearance: Normal except as noted: excessive pronation-- and-- pes planus  Great toe deformities include a bunion  Tenderness: None except the great toe  Left Ankle: ROM: limited ROM in all planes Motor: diffuse weakness  Right Ankle: ROM: limited ROM in all planes Motor: diffuse weakness  Neurological Exam: performed  Light touch was decreased bilaterally  Vibratory sensation was decreased in both first metatarsophalangeal joints  Vascular Exam: performed Dorsalis pedis pulses were One/4 bilaterally  Posterior tibial pulses were One/4 bilaterally  Elevation Pallor: present bilaterally  Capillary refill time was greater than 3 seconds bilaterally-- and-- Q 9, findings bilateral  Negative digital hair  Edema: mild bilaterally-- and-- No Homans sign  Toenails:  All of the toenails were elongated,-- hypertrophied,-- discolored,-- shown to have subungual debris-- and-- Mycotic     Socks and shoes removed, Right Foot Findings: erythematous and dry  Diminished tactile sensation with monofilament testing throughout the right foot   Socks and shoes removed, Left Foot Findings: erythematous and dry  Diminished tactile sensation with monofilament testing throughout the left foot  Capillary refills findings on the right were delayed in the toes   Pulses:  1+ in the posterior tibialis on the right  1+ in the dorsalis pedis on the right   Capillary refills findings on the left were delayed in the toes   Pulses:  1+ in the posterior tibialis on the left  1+ in the dorsalis pedis on the left   Assign Risk Category: 2: Loss of protective sensation with or without weakness, deformity, callus, pre-ulcer, or history of ulceration  High risk  Hyperkeratosis: present on both first toes,-- present on both first sub metatarsals-- and-- Pinch callus, bilateral    Shoe Gear Evaluation: performed ()  Right Foot: width: E -- and-- length: 12  Left Foot: width: E -- and-- length: 12  Recommendation(s): custom inlays       Procedure  Patient educated on care of the diabetic foot  To reduce callus formation, patient would benefit from diabetic inlays     Patient ID: Danny Beckham is a 83 y  o  male         Objective:      Foot Exam     Right Foot/Ankle      Inspection and Palpation  Skin Exam: callus and dry skin;      Neurovascular  Dorsalis pedis: 1+  Posterior tibial: 1+        Left Foot/Ankle       Inspection and Palpation  Skin Exam: callus and dry skin;      Neurovascular  Dorsalis pedis: 1+  Posterior tibial: 1+        Physical Exam   Cardiovascular: Pulses are weak pulses  Pulses:       Dorsalis pedis pulses are 1+ on the right side, and 1+ on the left side         Posterior tibial pulses are 1+ on the right side, and 1+ on the left side  Feet:   Right Foot:   Skin Integrity: Positive for callus and dry skin     Left Foot:   Skin Integrity: Positive for callus and dry skin  Patient's shoes and socks removed  Right Foot/Ankle   Right Foot Inspection  Skin Exam: dry skin, callus and callus               Toe Exam: erythema  Sensory   Vibration: diminished  Proprioception: diminished   Monofilament testing: diminished  Vascular  Capillary refills: elevated  The right DP pulse is 1+  The right PT pulse is 1+       Left Foot/Ankle  Left Foot Inspection  Skin Exam: dry skin and callus              Toe Exam: erythema                   Sensory   Vibration: diminished  Proprioception: diminished  Monofilament: diminished  Vascular  Capillary refills: elevated  The left DP pulse is 1+  The left PT pulse is 1+  Assign Risk Category:  Deformity present;  Loss of protective sensation; Weak pulses       Risk: 2

## 2018-12-04 DIAGNOSIS — I10 BENIGN ESSENTIAL HYPERTENSION: ICD-10-CM

## 2018-12-05 DIAGNOSIS — I10 BENIGN ESSENTIAL HYPERTENSION: ICD-10-CM

## 2018-12-05 RX ORDER — TRIAMTERENE AND HYDROCHLOROTHIAZIDE 37.5; 25 MG/1; MG/1
TABLET ORAL
Qty: 45 TABLET | Refills: 3 | Status: SHIPPED | OUTPATIENT
Start: 2018-12-05 | End: 2019-10-11 | Stop reason: SDUPTHER

## 2018-12-06 RX ORDER — POTASSIUM CHLORIDE 20 MEQ/1
TABLET, EXTENDED RELEASE ORAL
Qty: 360 TABLET | Refills: 1 | Status: SHIPPED | OUTPATIENT
Start: 2018-12-06 | End: 2019-05-11 | Stop reason: SDUPTHER

## 2019-01-30 ENCOUNTER — OFFICE VISIT (OUTPATIENT)
Dept: PODIATRY | Facility: CLINIC | Age: 84
End: 2019-01-30
Payer: COMMERCIAL

## 2019-01-30 VITALS — HEIGHT: 71 IN | RESPIRATION RATE: 17 BRPM | WEIGHT: 231 LBS | BODY MASS INDEX: 32.34 KG/M2

## 2019-01-30 DIAGNOSIS — M79.671 PAIN IN BOTH FEET: Primary | ICD-10-CM

## 2019-01-30 DIAGNOSIS — E11.42 DIABETIC POLYNEUROPATHY ASSOCIATED WITH TYPE 2 DIABETES MELLITUS (HCC): ICD-10-CM

## 2019-01-30 DIAGNOSIS — M79.672 PAIN IN BOTH FEET: Primary | ICD-10-CM

## 2019-01-30 DIAGNOSIS — I70.209 PERIPHERAL ARTERIOSCLEROSIS (HCC): ICD-10-CM

## 2019-01-30 DIAGNOSIS — L84 CORNS: ICD-10-CM

## 2019-01-30 PROCEDURE — 11056 PARNG/CUTG B9 HYPRKR LES 2-4: CPT | Performed by: PODIATRIST

## 2019-01-30 NOTE — PROGRESS NOTES
Procedures   Foot Exam    Right Foot/Ankle     Inspection and Palpation  Skin Exam: callus; Neurovascular  Dorsalis pedis: 1+  Posterior tibial: 1+      Left Foot/Ankle      Inspection and Palpation  Skin Exam: callus; Neurovascular  Dorsalis pedis: 1+  Posterior tibial: 1+                Subjective:  patient, a diabetic white male, complains of pain in his feet with ambulation   No history of trauma     History of Present Illness  HPI: Patient is a diabetic with pain in his feet with ambulation  No history of trauma  Patient is pre-ulcerative calluses  These hurt with ambulation       Review of Systems   Constitutional: feeling poorly, but-- as noted in HPI,-- no fever-- and-- no chills    ENT: nasal discharge, but-- as noted in HPI   Cardiovascular: no complaints of slow or fast heart rate, no chest pain, no palpitations, no leg claudication or lower extremity edema   Respiratory: cough, but-- as noted in HPI   Gastrointestinal: no complaints of abdominal pain, no constipation, no nausea or vomiting, no diarrhea or bloody stools   Genitourinary: no complaints of dysuria or incontinence, no hesitancy, no nocturia, no genital lesion, no inadequacy of penile erection   Musculoskeletal: no complaints of arthralgia, no myalgia, no joint swelling or stiffness, no limb pain or swelling   Integumentary: no complaints of skin rash or lesion, no itching or dry skin, no skin wounds       Active Problems  1  Abnormal EKG (794 31) (R94 31)   2  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)   3  Acute knee pain, right   4  Basal cell carcinoma of skin (173 91) (C44 91)   5  Benign essential hypertension (401 1) (I10)   6  BMI 31 0-31 9,adult (V85 31) (Z68 31)   7  Callus (700) (L84)   8  Cough (786 2) (R05)   9  Deformity of ankle and foot, acquired (736 70) (M21 969)   10  Diabetes mellitus with neuropathy (250 60,357 2) (E11 40)   11  Diabetic neuropathy (250 60,357 2) (E11 40)   12  Dizziness (780 4) (R42)   13  Encounter for prostate cancer screening (V76 44) (Z12 5)   14  Encounter for screening for cardiovascular disorders (V81 2) (Z13 6)   15  Encounter for screening for malignant neoplasm of colon (V76 51) (Z12 11)   16  Encounter for screening for malignant neoplasm of prostate (V76 44) (Z12 5)   17  Hypercholesterolemia (272 0) (E78 00)   18  Hypokalemia (276 8) (E87 6)   19  Limb pain (729 5) (M79 609)   20  Medicare annual wellness visit, initial (V70 0) (Z00 00)   21  Minor depression (311) (F32 9)   22  Need for vaccination (V05 9) (Z23)   23  Never a smoker   24  Non-healing ulcer (707 9) (L98 499)   25  Obesity, Class I, BMI 30-34 9 (278 00) (E66 9)   26  Onychomycosis (110 1) (B35 1)   27  Open wound of foot, left, subsequent encounter (V58 89,892 0) (S91 302D)   28  Pain in both feet (729 5) (M79 671,M79 672)   29  Primary dysthymia (300 4) (F34 1)   30  Screening for hypothyroidism (V77 0) (Z13 29)   31  Wound of skin (782 9) (R23 8)     Past Medical History   · History of Acute upper respiratory infection (465 9) (J06 9)   · History of Ascending cholangitis (576 1) (K83 0)   · History of Atherosclerosis of arteries of extremities (440 20) (I70 209)   · History of Bug bite with infection (919 5,E906 4) (W57 XXXA)   · History of Callus (700) (L84)   · History of Need for vaccination (V05 9) (Z23)   · Screening for genitourinary condition (V81 6) (Z13 89)   · History of Screening for genitourinary condition (V81 6) (Z13 89)     The active problems and past medical history were reviewed and updated today       Surgical History      · History of Appendectomy   · History of Cholecystectomy   · History of Open Treatment Of Fracture Of The Radial Shaft   · History of Tonsillectomy     The surgical history was reviewed and updated today        Family History  Mother    · Family history of Heart disease   · Family history of HTN (hypertension)   · Family history of Liver cancer   · Family history of Melanoma  Father    · Family history of    · Family history of Gastric ulcer  Brother    · Family history of Diabetes  Grandparent    · Family history of Diabetes     Social History      · Never a smoker  The social history was reviewed and updated today       Current Meds   1  Amlodipine Besy-Benazepril HCl - 5-20 MG Oral Capsule; take 1 capsule daily; Therapy: 52Nng2613 to (Last Rx:22Qny6298)  Requested for: 95Glc9343 Ordered   2  Aspirin EC Low Dose 81 MG Oral Tablet Delayed Release; 1 every day; Therapy: 99Gzf6855 to (Last Rx:71Srp6730) Ordered   3  Cheratussin -10 MG/5ML Oral Syrup; TAKE 5 - 10 ML EVERY 4 TO 6 HOURS AS NEEDED FOR COUGH; Therapy: 08Ydg4211 to (Evaluate:2017); Last Rx:65Aeq0343 Ordered   4  Cinnamon 500 MG Oral Tablet; Therapy: (Recorded:48Rwt5123) to Recorded   5  Coreg CR 20 MG Oral Capsule Extended Release 24 Hour; TAKE 1 CAPSULE EVERY MORNING WITH FOOD; Therapy: 57IWZ0475 to (Last Rx:2017)  Requested for: 67BIP2158 Ordered   6  Daily Multiple Vitamins Oral Tablet; Therapy: (Recorded:48Afz0331) to Recorded   7  Triny-C Oral Tablet; Therapy: (Recorded:45Tpb9828) to Recorded   8  Fish Oil 1000 MG Oral Capsule Delayed Release; Therapy: (Recorded:94Nyr7027) to Recorded   9  Flonase 50 MCG/ACT SUSP; Therapy: (Recorded:76Yhb6893) to Recorded   10  FreeStyle Test In Vitro Strip; test BID;  Dx:  M79 6[place]ce [place]: 79XKV7095 to (Last Rx:35Oil9994)  Requested for: 25RSA1924 Ordered   11  Ibuprofen 600 MG Oral Tablet; TAKE 1 TABLET BY MOUTH 3 TIMES A DAY WITH FOOD  AS NEEDED;  Therapy: 10YUL0325 to (Evaluate:2017)  Requested for: 22Znm8785; Last  Rx:27Hdb5932 Ordered   12  Magnesium Oxide 400 MG Oral Tablet;  Therapy: (Recorded:76Qbc4086) to Recorded   13  Maxzide-25 TABS; 1/2 tab daily in AM Recorded   14  Mupirocin 2 % External Ointment; APPLY SPARINGLY TO AFFECTED AREA(S) TWICE [place]: 31EVX6725 to (Last Rx:2016)  Requested for: 19SFA2406 Ordered   15  Olopatadine HCl - 0 1 % Ophthalmic Solution; INSTILL 1 DROP INTO AFFECTED  EYE(S) TWICE DAILY AS DIRECTED;  Therapy: 54DDV9737 to (Last Rx:46Eaj4346)  Requested for: 75Vyc4027 Ordered   16  Patanol 0 1 % Ophthalmic Solution; INSTILL 1 DROP INTO AFFECTED EYE(S) TWICE  DAILY AS DIRECTED as needed Recorded   17  Potassium Chloride Ghislaine ER 20 MEQ Oral Tablet Extended Release; take 2 tablets twice  a day;  Therapy: 05ECI5158 to (Last Rx:52Yyj9250)  Requested for: 97MVU1509 Ordered   18  Simvastatin 20 MG Oral Tablet; 1 Every Day At Bedtime;  Therapy: 14YXT6639 to (Last Rx:36Zul5466)  Requested for: 37Sfb3833 Ordered   19  Sulfamethoxazole-Trimethoprim 800-160 MG Oral Tablet; TAKE 1 TABLET TWICE Pama [place]: 42IIP3405 to (Evaluate:34Ufy7645)  Requested for: 23Hjw0711; Last  Rx:85Ghj7615 Ordered   20  Triamterene-HCTZ 37 5-25 MG Oral Tablet; TAKE ONE-HALF (1/2) TABLET DAILY;  Therapy: 38PTB7655 to (Last Rx:19Zbc4787)  Requested for: 39ODU9761 Ordered   21  Viibryd 20 MG Oral Tablet; 1 every day;  Therapy: 80SHK5430 to (Last Rx:01Mar2017)  Requested for: 45KCL3542 Ordered     The medication list was reviewed and updated today       Allergies  1  No Known Drug Allergies     Vitals        Physical Exam  Left Foot: Appearance: Normal except as noted: excessive pronation-- and-- pes planus  Great toe deformities include a bunion  Tenderness: None except the great toe  Right Foot: Appearance: Normal except as noted: excessive pronation-- and-- pes planus  Great toe deformities include a bunion  Tenderness: None except the great toe  Left Ankle: ROM: limited ROM in all planes Motor: diffuse weakness  Right Ankle: ROM: limited ROM in all planes Motor: diffuse weakness  Neurological Exam: performed  Light touch was decreased bilaterally  Vibratory sensation was decreased in both first metatarsophalangeal joints  Vascular Exam: performed Dorsalis pedis pulses were One/4 bilaterally   Posterior tibial pulses were One/4 bilaterally  Elevation Pallor: present bilaterally  Capillary refill time was greater than 3 seconds bilaterally-- and-- Q 9, findings bilateral  Negative digital hair  Edema: mild bilaterally-- and-- No Homans sign  Toenails: All of the toenails were elongated,-- hypertrophied,-- discolored,-- shown to have subungual debris-- and-- Mycotic     Socks and shoes removed, Right Foot Findings: erythematous and dry  Diminished tactile sensation with monofilament testing throughout the right foot   Socks and shoes removed, Left Foot Findings: erythematous and dry  Diminished tactile sensation with monofilament testing throughout the left foot  Capillary refills findings on the right were delayed in the toes   Pulses:  1+ in the posterior tibialis on the right  1+ in the dorsalis pedis on the right   Capillary refills findings on the left were delayed in the toes   Pulses:  1+ in the posterior tibialis on the left  1+ in the dorsalis pedis on the left   Assign Risk Category: 2: Loss of protective sensation with or without weakness, deformity, callus, pre-ulcer, or history of ulceration  High risk  Hyperkeratosis: present on both first toes,-- present on both first sub metatarsals-- and-- Pinch callus, bilateral    Shoe Gear Evaluation: performed ()  Right Foot: width: E -- and-- length: 12  Left Foot: width: E -- and-- length: 12  Recommendation(s): custom inlays  Patient's shoes and socks removed  Right Foot/Ankle   Right Foot Inspection  Skin Exam: callus and callus                          Toe Exam: swelling and erythema  Sensory   Vibration: diminished  Proprioception: diminished     Vascular  Capillary refills: elevated  The right DP pulse is 1+  The right PT pulse is 1+       Left Foot/Ankle  Left Foot Inspection  Skin Exam: callus                         Toe Exam: swelling and erythema                   Sensory   Vibration: diminished  Proprioception: diminished    Vascular  Capillary refills: elevated  The left DP pulse is 1+  The left PT pulse is 1+  Assign Risk Category:  Deformity present; Loss of protective sensation; Weak pulses       Risk: 2          Procedure  Patient educated on care of the diabetic foot  To reduce callus formation, patient would benefit from diabetic inlays     Patient ID: Danny Beckham is a 83 y  o  male

## 2019-03-23 DIAGNOSIS — E11.42 DIABETIC POLYNEUROPATHY ASSOCIATED WITH TYPE 2 DIABETES MELLITUS (HCC): ICD-10-CM

## 2019-03-23 DIAGNOSIS — E78.00 HYPERCHOLESTEROLEMIA: ICD-10-CM

## 2019-03-25 RX ORDER — SIMVASTATIN 20 MG
TABLET ORAL
Qty: 90 TABLET | Refills: 3 | Status: SHIPPED | OUTPATIENT
Start: 2019-03-25 | End: 2019-10-11 | Stop reason: SDUPTHER

## 2019-04-10 ENCOUNTER — OFFICE VISIT (OUTPATIENT)
Dept: PODIATRY | Facility: CLINIC | Age: 84
End: 2019-04-10
Payer: COMMERCIAL

## 2019-04-10 VITALS — HEIGHT: 71 IN | WEIGHT: 231 LBS | BODY MASS INDEX: 32.34 KG/M2 | RESPIRATION RATE: 17 BRPM

## 2019-04-10 DIAGNOSIS — M79.671 PAIN IN BOTH FEET: ICD-10-CM

## 2019-04-10 DIAGNOSIS — E11.42 DIABETIC POLYNEUROPATHY ASSOCIATED WITH TYPE 2 DIABETES MELLITUS (HCC): ICD-10-CM

## 2019-04-10 DIAGNOSIS — M21.969 ACQUIRED DEFORMITY OF FOOT, UNSPECIFIED LATERALITY: Primary | ICD-10-CM

## 2019-04-10 DIAGNOSIS — M77.42 METATARSALGIA OF BOTH FEET: ICD-10-CM

## 2019-04-10 DIAGNOSIS — I70.209 PERIPHERAL ARTERIOSCLEROSIS (HCC): ICD-10-CM

## 2019-04-10 DIAGNOSIS — M79.672 PAIN IN BOTH FEET: ICD-10-CM

## 2019-04-10 DIAGNOSIS — M77.41 METATARSALGIA OF BOTH FEET: ICD-10-CM

## 2019-04-10 PROCEDURE — 99213 OFFICE O/P EST LOW 20 MIN: CPT | Performed by: PODIATRIST

## 2019-04-23 DIAGNOSIS — I10 BENIGN ESSENTIAL HYPERTENSION: ICD-10-CM

## 2019-04-23 RX ORDER — CARVEDILOL PHOSPHATE 20 MG/1
20 CAPSULE, EXTENDED RELEASE ORAL DAILY
Qty: 30 CAPSULE | Refills: 0 | Status: SHIPPED | OUTPATIENT
Start: 2019-04-23 | End: 2020-01-07

## 2019-04-23 RX ORDER — CARVEDILOL PHOSPHATE 20 MG/1
20 CAPSULE, EXTENDED RELEASE ORAL DAILY
Qty: 90 CAPSULE | Refills: 1 | Status: SHIPPED | OUTPATIENT
Start: 2019-04-23 | End: 2019-04-23 | Stop reason: SDUPTHER

## 2019-04-23 RX ORDER — AMLODIPINE BESYLATE AND BENAZEPRIL HYDROCHLORIDE 5; 20 MG/1; MG/1
1 CAPSULE ORAL DAILY
Qty: 30 CAPSULE | Refills: 0 | Status: SHIPPED | OUTPATIENT
Start: 2019-04-23 | End: 2020-01-07

## 2019-04-23 RX ORDER — AMLODIPINE BESYLATE AND BENAZEPRIL HYDROCHLORIDE 5; 20 MG/1; MG/1
1 CAPSULE ORAL DAILY
Qty: 90 CAPSULE | Refills: 1 | Status: SHIPPED | OUTPATIENT
Start: 2019-04-23 | End: 2019-04-23 | Stop reason: SDUPTHER

## 2019-04-29 DIAGNOSIS — K64.4 EXTERNAL HEMORRHOIDS: Primary | ICD-10-CM

## 2019-04-29 RX ORDER — DIAPER,BRIEF,INFANT-TODD,DISP
EACH MISCELLANEOUS 4 TIMES DAILY PRN
Qty: 120 G | Refills: 0 | Status: SHIPPED | OUTPATIENT
Start: 2019-04-29

## 2019-05-11 DIAGNOSIS — I10 BENIGN ESSENTIAL HYPERTENSION: ICD-10-CM

## 2019-05-13 RX ORDER — POTASSIUM CHLORIDE 20 MEQ/1
TABLET, EXTENDED RELEASE ORAL
Qty: 360 TABLET | Refills: 1 | Status: SHIPPED | OUTPATIENT
Start: 2019-05-13 | End: 2019-09-30 | Stop reason: SDUPTHER

## 2019-05-23 DIAGNOSIS — R05.9 COUGH: Primary | ICD-10-CM

## 2019-05-23 RX ORDER — GUAIFENESIN AND CODEINE PHOSPHATE 100; 10 MG/5ML; MG/5ML
10 SOLUTION ORAL
Qty: 120 ML | Refills: 0 | Status: SHIPPED | OUTPATIENT
Start: 2019-05-23 | End: 2019-10-11

## 2019-07-11 ENCOUNTER — OFFICE VISIT (OUTPATIENT)
Dept: PODIATRY | Facility: CLINIC | Age: 84
End: 2019-07-11
Payer: COMMERCIAL

## 2019-07-11 VITALS
DIASTOLIC BLOOD PRESSURE: 85 MMHG | HEIGHT: 71 IN | WEIGHT: 231 LBS | BODY MASS INDEX: 32.34 KG/M2 | SYSTOLIC BLOOD PRESSURE: 139 MMHG

## 2019-07-11 DIAGNOSIS — L84 CORNS: ICD-10-CM

## 2019-07-11 DIAGNOSIS — I70.209 PERIPHERAL ARTERIOSCLEROSIS (HCC): ICD-10-CM

## 2019-07-11 DIAGNOSIS — M79.672 PAIN IN BOTH FEET: ICD-10-CM

## 2019-07-11 DIAGNOSIS — M79.671 PAIN IN BOTH FEET: ICD-10-CM

## 2019-07-11 DIAGNOSIS — E11.42 DIABETIC POLYNEUROPATHY ASSOCIATED WITH TYPE 2 DIABETES MELLITUS (HCC): Primary | ICD-10-CM

## 2019-07-11 PROCEDURE — 11056 PARNG/CUTG B9 HYPRKR LES 2-4: CPT | Performed by: PODIATRIST

## 2019-07-11 NOTE — PROGRESS NOTES
Assessment  Diabetic neuropathy  Pain upon ambulation  Peripheral artery disease  Mycosis of nail  Callus formation  Foot deformity  Plan  Foot exam performed  Patient educated on care of the diabetic foot  All mycotic nails debrided  Calluses debrided without pain or complication           Subjective:  patient, a diabetic white male, complains of pain in his feet with ambulation   No history of trauma     History of Present Illness  HPI: Patient is a diabetic with pain in his feet with ambulation  No history of trauma  Patient is pre-ulcerative calluses  These hurt with ambulation       Review of Systems   Constitutional: feeling poorly, but-- as noted in HPI,-- no fever-- and-- no chills    ENT: nasal discharge, but-- as noted in HPI   Cardiovascular: no complaints of slow or fast heart rate, no chest pain, no palpitations, no leg claudication or lower extremity edema   Respiratory: cough, but-- as noted in HPI   Gastrointestinal: no complaints of abdominal pain, no constipation, no nausea or vomiting, no diarrhea or bloody stools   Genitourinary: no complaints of dysuria or incontinence, no hesitancy, no nocturia, no genital lesion, no inadequacy of penile erection   Musculoskeletal: no complaints of arthralgia, no myalgia, no joint swelling or stiffness, no limb pain or swelling   Integumentary: no complaints of skin rash or lesion, no itching or dry skin, no skin wounds       Active Problems  1  Abnormal EKG (794 31) (R94 31)   2  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)   3  Acute knee pain, right   4  Basal cell carcinoma of skin (173 91) (C44 91)   5  Benign essential hypertension (401 1) (I10)   6  BMI 31 0-31 9,adult (V85 31) (Z68 31)   7  Callus (700) (L84)   8  Cough (786 2) (R05)   9  Deformity of ankle and foot, acquired (736 70) (M21 969)   10  Diabetes mellitus with neuropathy (250 60,357 2) (E11 40)   11  Diabetic neuropathy (250 60,357 2) (E11 40)   12  Dizziness (780 4) (R42)   13  Encounter for prostate cancer screening (V76 44) (Z12 5)   14  Encounter for screening for cardiovascular disorders (V81 2) (Z13 6)   15  Encounter for screening for malignant neoplasm of colon (V76 51) (Z12 11)   16  Encounter for screening for malignant neoplasm of prostate (V76 44) (Z12 5)   17  Hypercholesterolemia (272 0) (E78 00)   18  Hypokalemia (276 8) (E87 6)   19  Limb pain (729 5) (M79 609)   20  Medicare annual wellness visit, initial (V70 0) (Z00 00)   21  Minor depression (311) (F32 9)   22  Need for vaccination (V05 9) (Z23)   23  Never a smoker   24  Non-healing ulcer (707 9) (L98 499)   25  Obesity, Class I, BMI 30-34 9 (278 00) (E66 9)   26  Onychomycosis (110 1) (B35 1)   27  Open wound of foot, left, subsequent encounter (V58 89,892 0) (S91 302D)   28  Pain in both feet (729 5) (M79 671,M79 672)   29  Primary dysthymia (300 4) (F34 1)   30  Screening for hypothyroidism (V77 0) (Z13 29)   31  Wound of skin (782 9) (R23 8)     Past Medical History   · History of Acute upper respiratory infection (465 9) (J06 9)   · History of Ascending cholangitis (576 1) (K83 0)   · History of Atherosclerosis of arteries of extremities (440 20) (I70 209)   · History of Bug bite with infection (919 5,E906 4) (W57 XXXA)   · History of Callus (700) (L84)   · History of Need for vaccination (V05 9) (Z23)   · Screening for genitourinary condition (V81 6) (Z13 89)   · History of Screening for genitourinary condition (V81 6) (Z13 89)     The active problems and past medical history were reviewed and updated today       Surgical History      · History of Appendectomy   · History of Cholecystectomy   · History of Open Treatment Of Fracture Of The Radial Shaft   · History of Tonsillectomy     The surgical history was reviewed and updated today        Family History  Mother    · Family history of Heart disease   · Family history of HTN (hypertension)   · Family history of Liver cancer   · Family history of Melanoma  Father    · Family history of    · Family history of Gastric ulcer  Brother    · Family history of Diabetes  Grandparent    · Family history of Diabetes     Social History      · Never a smoker  The social history was reviewed and updated today     The medication list was reviewed and updated today       Allergies  1  No Known Drug Allergies     Vitals        Physical Exam  Left Foot: Appearance: Normal except as noted: excessive pronation-- and-- pes planus  Great toe deformities include a bunion  Tenderness: None except the great toe  Right Foot: Appearance: Normal except as noted: excessive pronation-- and-- pes planus  Great toe deformities include a bunion  Tenderness: None except the great toe  Left Ankle: ROM: limited ROM in all planes Motor: diffuse weakness  Right Ankle: ROM: limited ROM in all planes Motor: diffuse weakness  Neurological Exam: performed  Light touch was decreased bilaterally  Vibratory sensation was decreased in both first metatarsophalangeal joints  Vascular Exam: performed Dorsalis pedis pulses were One/4 bilaterally  Posterior tibial pulses were One/4 bilaterally  Elevation Pallor: present bilaterally  Capillary refill time was greater than 3 seconds bilaterally-- and-- Q 9, findings bilateral  Negative digital hair  Edema: mild bilaterally-- and-- No Homans sign  Toenails: All of the toenails were elongated,-- hypertrophied,-- discolored,-- shown to have subungual debris-- and-- Mycotic     Socks and shoes removed, Right Foot Findings: erythematous and dry  Diminished tactile sensation with monofilament testing throughout the right foot   Socks and shoes removed, Left Foot Findings: erythematous and dry   Diminished tactile sensation with monofilament testing throughout the left foot  Capillary refills findings on the right were delayed in the toes   Pulses:  1+ in the posterior tibialis on the right  1+ in the dorsalis pedis on the right   Capillary refills findings on the left were delayed in the toes   Pulses:  1+ in the posterior tibialis on the left  1+ in the dorsalis pedis on the left   Assign Risk Category: 2: Loss of protective sensation with or without weakness, deformity, callus, pre-ulcer, or history of ulceration  High risk  Hyperkeratosis: present on both first toes,-- present on both first sub metatarsals-- and-- Pinch callus, bilateral    Shoe Gear Evaluation: performed ()  Right Foot: width: E -- and-- length: 12  Left Foot: width: E -- and-- length: 12  Recommendation(s): custom inlays      Patient's shoes and socks removed  Right Foot/Ankle   Right Foot Inspection  Skin Exam: callus and callus               Toe Exam: swelling and erythema  Sensory   Vibration: diminished  Proprioception: diminished      Vascular  Capillary refills: elevated  The right DP pulse is 1+  The right PT pulse is 1+       Left Foot/Ankle  Left Foot Inspection  Skin Exam: callus              Toe Exam: swelling and erythema                   Sensory   Vibration: diminished  Proprioception: diminished     Vascular  Capillary refills: elevated  The left DP pulse is 1+  The left PT pulse is 1+       Patient's shoes and socks removed  Assign Risk Category:  Deformity present;  Loss of protective sensation; Weak pulses       Risk: 2

## 2019-08-12 LAB
LEFT EYE DIABETIC RETINOPATHY: NORMAL
RIGHT EYE DIABETIC RETINOPATHY: NORMAL

## 2019-08-14 DIAGNOSIS — Z12.5 SCREENING FOR PROSTATE CANCER: ICD-10-CM

## 2019-08-14 DIAGNOSIS — E11.42 DIABETIC POLYNEUROPATHY ASSOCIATED WITH TYPE 2 DIABETES MELLITUS (HCC): Primary | ICD-10-CM

## 2019-08-14 DIAGNOSIS — E78.00 HYPERCHOLESTEROLEMIA: ICD-10-CM

## 2019-08-14 DIAGNOSIS — I10 BENIGN ESSENTIAL HYPERTENSION: ICD-10-CM

## 2019-09-10 DIAGNOSIS — I10 BENIGN ESSENTIAL HYPERTENSION: ICD-10-CM

## 2019-09-11 RX ORDER — AMLODIPINE BESYLATE AND BENAZEPRIL HYDROCHLORIDE 5; 20 MG/1; MG/1
1 CAPSULE ORAL DAILY
Qty: 90 CAPSULE | Refills: 1 | Status: SHIPPED | OUTPATIENT
Start: 2019-09-11 | End: 2019-10-11 | Stop reason: SDUPTHER

## 2019-09-11 RX ORDER — CARVEDILOL PHOSPHATE 20 MG/1
CAPSULE, EXTENDED RELEASE ORAL
Qty: 90 CAPSULE | Refills: 1 | Status: SHIPPED | OUTPATIENT
Start: 2019-09-11 | End: 2019-10-11 | Stop reason: SDUPTHER

## 2019-09-22 DIAGNOSIS — F32.A MINOR DEPRESSION: ICD-10-CM

## 2019-09-22 RX ORDER — VILAZODONE HYDROCHLORIDE 20 MG/1
TABLET ORAL
Qty: 30 TABLET | Refills: 2 | Status: SHIPPED | OUTPATIENT
Start: 2019-09-22 | End: 2020-03-27

## 2019-09-30 DIAGNOSIS — I10 BENIGN ESSENTIAL HYPERTENSION: ICD-10-CM

## 2019-10-01 RX ORDER — POTASSIUM CHLORIDE 20 MEQ/1
TABLET, EXTENDED RELEASE ORAL
Qty: 360 TABLET | Refills: 1 | Status: SHIPPED | OUTPATIENT
Start: 2019-10-01 | End: 2020-04-14

## 2019-10-07 ENCOUNTER — OFFICE VISIT (OUTPATIENT)
Dept: PODIATRY | Facility: CLINIC | Age: 84
End: 2019-10-07
Payer: COMMERCIAL

## 2019-10-07 VITALS
DIASTOLIC BLOOD PRESSURE: 77 MMHG | HEIGHT: 61 IN | SYSTOLIC BLOOD PRESSURE: 139 MMHG | WEIGHT: 231 LBS | HEART RATE: 68 BPM | BODY MASS INDEX: 43.61 KG/M2 | RESPIRATION RATE: 17 BRPM

## 2019-10-07 DIAGNOSIS — I70.209 PERIPHERAL ARTERIOSCLEROSIS (HCC): ICD-10-CM

## 2019-10-07 DIAGNOSIS — E11.42 DIABETIC POLYNEUROPATHY ASSOCIATED WITH TYPE 2 DIABETES MELLITUS (HCC): Primary | ICD-10-CM

## 2019-10-07 DIAGNOSIS — M79.672 PAIN IN BOTH FEET: ICD-10-CM

## 2019-10-07 DIAGNOSIS — L84 CORNS: ICD-10-CM

## 2019-10-07 DIAGNOSIS — M79.671 PAIN IN BOTH FEET: ICD-10-CM

## 2019-10-07 PROCEDURE — 11056 PARNG/CUTG B9 HYPRKR LES 2-4: CPT | Performed by: PODIATRIST

## 2019-10-08 LAB
ALBUMIN SERPL-MCNC: 4.3 G/DL (ref 3.5–4.7)
ALBUMIN/CREAT UR: 8.8 MG/G CREAT (ref 0–30)
ALBUMIN/GLOB SERPL: 1.8 {RATIO} (ref 1.2–2.2)
ALP SERPL-CCNC: 57 IU/L (ref 39–117)
ALT SERPL-CCNC: 32 IU/L (ref 0–44)
AST SERPL-CCNC: 27 IU/L (ref 0–40)
BASOPHILS # BLD AUTO: 0 X10E3/UL (ref 0–0.2)
BASOPHILS NFR BLD AUTO: 0 %
BILIRUB SERPL-MCNC: 0.7 MG/DL (ref 0–1.2)
BUN SERPL-MCNC: 17 MG/DL (ref 8–27)
BUN/CREAT SERPL: 13 (ref 10–24)
CALCIUM SERPL-MCNC: 9.3 MG/DL (ref 8.6–10.2)
CHLORIDE SERPL-SCNC: 100 MMOL/L (ref 96–106)
CHOLEST SERPL-MCNC: 144 MG/DL (ref 100–199)
CO2 SERPL-SCNC: 21 MMOL/L (ref 20–29)
CREAT SERPL-MCNC: 1.31 MG/DL (ref 0.76–1.27)
CREAT UR-MCNC: 147.4 MG/DL
EOSINOPHIL # BLD AUTO: 0.2 X10E3/UL (ref 0–0.4)
EOSINOPHIL NFR BLD AUTO: 3 %
ERYTHROCYTE [DISTWIDTH] IN BLOOD BY AUTOMATED COUNT: 13.5 % (ref 12.3–15.4)
GLOBULIN SER-MCNC: 2.4 G/DL (ref 1.5–4.5)
GLUCOSE SERPL-MCNC: 210 MG/DL (ref 65–99)
HBA1C MFR BLD: 8.9 % (ref 4.8–5.6)
HCT VFR BLD AUTO: 42 % (ref 37.5–51)
HDLC SERPL-MCNC: 40 MG/DL
HGB BLD-MCNC: 14.7 G/DL (ref 13–17.7)
IMM GRANULOCYTES # BLD: 0 X10E3/UL (ref 0–0.1)
IMM GRANULOCYTES NFR BLD: 0 %
LDLC SERPL CALC-MCNC: 50 MG/DL (ref 0–99)
LYMPHOCYTES # BLD AUTO: 1.7 X10E3/UL (ref 0.7–3.1)
LYMPHOCYTES NFR BLD AUTO: 31 %
MCH RBC QN AUTO: 32 PG (ref 26.6–33)
MCHC RBC AUTO-ENTMCNC: 35 G/DL (ref 31.5–35.7)
MCV RBC AUTO: 91 FL (ref 79–97)
MICROALBUMIN UR-MCNC: 12.9 UG/ML
MICRODELETION SYND BLD/T FISH: NORMAL
MICRODELETION SYND BLD/T FISH: NORMAL
MONOCYTES # BLD AUTO: 0.6 X10E3/UL (ref 0.1–0.9)
MONOCYTES NFR BLD AUTO: 10 %
NEUTROPHILS # BLD AUTO: 3 X10E3/UL (ref 1.4–7)
NEUTROPHILS NFR BLD AUTO: 56 %
PLATELET # BLD AUTO: 145 X10E3/UL (ref 150–450)
POTASSIUM SERPL-SCNC: 4.3 MMOL/L (ref 3.5–5.2)
PROT SERPL-MCNC: 6.7 G/DL (ref 6–8.5)
PSA SERPL-MCNC: 1.7 NG/ML (ref 0–4)
RBC # BLD AUTO: 4.6 X10E6/UL (ref 4.14–5.8)
SL AMB EGFR AFRICAN AMERICAN: 58 ML/MIN/1.73
SL AMB EGFR NON AFRICAN AMERICAN: 50 ML/MIN/1.73
SL AMB PDF IMAGE: NORMAL
SODIUM SERPL-SCNC: 140 MMOL/L (ref 134–144)
TRIGL SERPL-MCNC: 270 MG/DL (ref 0–149)
TSH SERPL DL<=0.005 MIU/L-ACNC: 2.06 UIU/ML (ref 0.45–4.5)
WBC # BLD AUTO: 5.4 X10E3/UL (ref 3.4–10.8)

## 2019-10-11 ENCOUNTER — OFFICE VISIT (OUTPATIENT)
Dept: FAMILY MEDICINE CLINIC | Facility: CLINIC | Age: 84
End: 2019-10-11
Payer: COMMERCIAL

## 2019-10-11 VITALS
WEIGHT: 225 LBS | HEIGHT: 71 IN | OXYGEN SATURATION: 97 % | BODY MASS INDEX: 31.5 KG/M2 | DIASTOLIC BLOOD PRESSURE: 64 MMHG | TEMPERATURE: 98 F | HEART RATE: 67 BPM | RESPIRATION RATE: 16 BRPM | SYSTOLIC BLOOD PRESSURE: 130 MMHG

## 2019-10-11 DIAGNOSIS — Z00.00 MEDICARE ANNUAL WELLNESS VISIT, SUBSEQUENT: ICD-10-CM

## 2019-10-11 DIAGNOSIS — E78.2 MIXED HYPERLIPIDEMIA: ICD-10-CM

## 2019-10-11 DIAGNOSIS — E78.00 HYPERCHOLESTEROLEMIA: ICD-10-CM

## 2019-10-11 DIAGNOSIS — I10 BENIGN ESSENTIAL HYPERTENSION: ICD-10-CM

## 2019-10-11 DIAGNOSIS — E66.9 OBESITY (BMI 30.0-34.9): ICD-10-CM

## 2019-10-11 DIAGNOSIS — I70.209 PERIPHERAL ARTERIOSCLEROSIS (HCC): ICD-10-CM

## 2019-10-11 DIAGNOSIS — N18.31 CHRONIC KIDNEY DISEASE (CKD) STAGE G3A/A1, MODERATELY DECREASED GLOMERULAR FILTRATION RATE (GFR) BETWEEN 45-59 ML/MIN/1.73 SQUARE METER AND ALBUMINURIA CREATININE RATIO LESS THAN 30 MG/G (HCC): ICD-10-CM

## 2019-10-11 DIAGNOSIS — E11.42 DIABETIC POLYNEUROPATHY ASSOCIATED WITH TYPE 2 DIABETES MELLITUS (HCC): Primary | ICD-10-CM

## 2019-10-11 DIAGNOSIS — Z23 NEED FOR VACCINATION: ICD-10-CM

## 2019-10-11 PROCEDURE — 3078F DIAST BP <80 MM HG: CPT | Performed by: FAMILY MEDICINE

## 2019-10-11 PROCEDURE — G0439 PPPS, SUBSEQ VISIT: HCPCS | Performed by: FAMILY MEDICINE

## 2019-10-11 PROCEDURE — 90662 IIV NO PRSV INCREASED AG IM: CPT

## 2019-10-11 PROCEDURE — G0008 ADMIN INFLUENZA VIRUS VAC: HCPCS

## 2019-10-11 PROCEDURE — 99214 OFFICE O/P EST MOD 30 MIN: CPT | Performed by: FAMILY MEDICINE

## 2019-10-11 PROCEDURE — 1125F AMNT PAIN NOTED PAIN PRSNT: CPT

## 2019-10-11 PROCEDURE — 3075F SYST BP GE 130 - 139MM HG: CPT | Performed by: FAMILY MEDICINE

## 2019-10-11 PROCEDURE — 1101F PT FALLS ASSESS-DOCD LE1/YR: CPT | Performed by: FAMILY MEDICINE

## 2019-10-11 PROCEDURE — 1170F FXNL STATUS ASSESSED: CPT

## 2019-10-11 RX ORDER — SIMVASTATIN 20 MG
TABLET ORAL
Qty: 90 TABLET | Refills: 3
Start: 2019-10-11 | End: 2020-01-07

## 2019-10-11 NOTE — PROGRESS NOTES
Assessment and Plan:     Problem List Items Addressed This Visit        Endocrine    Diabetic polyneuropathy associated with type 2 diabetes mellitus ([facility] ) - Primary    Relevant Medications    simvastatin (ZOCOR) 20 mg tablet    metFORMIN (GLUCOPHAGE) 1000 MG tablet    Other Relevant Orders    Hemoglobin A1C    Comprehensive metabolic panel    Hemoglobin A1C       Cardiovascular and Mediastinum    Benign essential hypertension    RESOLVED: Peripheral arteriosclerosis (HCC)       Genitourinary    Chronic kidney disease (CKD) stage G3a/A1, moderately decreased glomerular filtration rate (GFR) between 45-59 mL/min/1 73 square meter and albuminuria creatinine ratio less than 30 mg/g (HCC)       Other    Mixed hyperlipidemia    Relevant Medications    simvastatin (ZOCOR) 20 mg tablet      Other Visit Diagnoses     Need for vaccination        Relevant Orders    influenza vaccine, high-dose, PF 0 5 mL (Completed)    Obesity (BMI 30 0-34  9)        BMI 31 0-31 9,adult        Hypercholesterolemia        Relevant Medications    simvastatin (ZOCOR) 20 mg tablet    Medicare annual wellness visit, subsequent               Preventive health issues were discussed with patient, and age appropriate screening tests were ordered as noted in patient's After Visit Summary  Personalized health advice and appropriate referrals for health education or preventive services given if needed, as noted in patient's After Visit Summary       History of Present Illness:     Patient presents for Medicare Annual Wellness visit    Patient Care Team:  Nemo Feliciano DO as PCP - General  DO Tori Saba DPM     Problem List:     Patient Active Problem List   Diagnosis    Corns    Pain in both feet    Diabetic polyneuropathy associated with type 2 diabetes mellitus ([facility] )    Mixed hyperlipidemia    Benign essential hypertension    Minor depression    Acquired deformity of foot    Metatarsalgia of both feet    Basal cell carcinoma of skin    Diabetic neuropathy (HCC)    Chronic kidney disease (CKD) stage G3a/A1, moderately decreased glomerular filtration rate (GFR) between 45-59 mL/min/1 73 square meter and albuminuria creatinine ratio less than 30 mg/g [facility])      Past Medical and Surgical History:     Past Medical History:   Diagnosis Date    Ascending cholangitis     Last assessed 1/22/2015     Atherosclerosis of arteries of extremities ([address] )     Resolved 12/19/2016      Past Surgical History:   Procedure Laterality Date    APPENDECTOMY      Last assessed 1/22/2015     CHOLECYSTECTOMY      Last assessed 1/22/2015     FRACTURE SURGERY      Open treatment of fracture of the radial shaft   Last assessed 1/22/2015     TONSILLECTOMY      Last assessed 1/22/2015       Family History:     Family History   Problem Relation Age of Onset    Hypertension Father     Other Father         Gastric ulcer     Heart disease Mother     Hypertension Mother     Liver cancer Mother     Melanoma Mother     Diabetes Brother     Diabetes Family     Diabetes Maternal Grandfather     Mental illness Neg Hx       Social History:     Social History     Socioeconomic History    Marital status: /Civil Union     Spouse name: None    Number of children: None    Years of education: None    Highest education level: None   Occupational History    None   Social Needs    Financial resource strain: None    Food insecurity:     Worry: None     Inability: None    Transportation needs:     Medical: None     Non-medical: None   Tobacco Use    Smoking status: Never Smoker    Smokeless tobacco: Never Used   Substance and Sexual Activity    Alcohol use: None    Drug use: None    Sexual activity: None   Lifestyle    Physical activity:     Days per week: None     Minutes per session: None    Stress: None   Relationships    Social connections:     Talks on phone: None     Gets together: None     Attends Jewish service: None     Active member of club or organization: None     Attends meetings of clubs or organizations: None     Relationship status: None    Intimate partner violence:     Fear of current or ex partner: None     Emotionally abused: None     Physically abused: None     Forced sexual activity: None   Other Topics Concern    None   Social History Narrative    None       Medications and Allergies:     Current Outpatient Medications   Medication Sig Dispense Refill    amLODIPine-benazepril (LOTREL 5-20) 5-20 MG per capsule Take 1 capsule by mouth daily 30 capsule 0    Bioflavonoid Products (ZULEYKA-C) 500-200-60 MG TABS Take by mouth      carvedilol (COREG CR) 20 MG 24 hr capsule Take 1 capsule (20 mg total) by mouth daily 30 capsule 0    Cinnamon 500 MG TABS Take by mouth      fluticasone (FLONASE) 50 mcg/act nasal spray into each nostril      glucose blood (FREESTYLE LITE) test strip by In Vitro route      hydrocortisone 1 % cream Apply topically 4 (four) times a day as needed for irritation 120 g 0    Magnesium Oxide 140 MG CAPS Take by mouth      Multiple Vitamins-Iron (DAILY MULTIPLE VITAMIN/IRON PO) Take by mouth      olopatadine (PATANOL) 0 1 % ophthalmic solution Apply 1 drop to eye 2 (two) times a day      Omega-3 Fatty Acids (FISH OIL) 1000 MG CPDR Take by mouth      potassium chloride (K-DUR,KLOR-CON) 20 mEq tablet TAKE 2 TABLETS BY MOUTH 2  TIMES A  tablet 1    simvastatin (ZOCOR) 20 mg tablet TAKE ONE TABLET DAILY AT  BEDTIME 90 tablet 3    triamterene-hydrochlorothiazide (MAXZIDE-25) 37 5-25 mg per tablet TAKE ONE-HALF (1/2) TABLET  DAILY 45 tablet 3    VIIBRYD 20 MG tablet TAKE 1 TABLET BY MOUTH EVERY DAY 30 tablet 2    metFORMIN (GLUCOPHAGE) 1000 MG tablet Take 1 tablet (1,000 mg total) by mouth 2 (two) times a day with meals 180 tablet 1    mupirocin (BACTROBAN) 2 % cream Apply topically 2 (two) times a day       No current facility-administered medications for this visit        Allergies Allergen Reactions    Pollen Extract Allergic Rhinitis      Immunizations:     Immunization History   Administered Date(s) Administered    INFLUENZA 09/14/2009, 09/19/2011    Influenza Quadrivalent Preservative Free 3 years and older IM 09/10/2012, 10/08/2013, 09/16/2014    Influenza Split High Dose Preservative Free IM 10/27/2015, 11/02/2016, 11/30/2017, 10/18/2018    Influenza, high dose seasonal 0 5 mL 10/11/2019    Pneumococcal Conjugate 13-Valent 10/27/2015    Pneumococcal Polysaccharide PPV23 11/14/2007    Td (adult), adsorbed 05/04/2007    Tdap 01/11/2011      Health Maintenance: There are no preventive care reminders to display for this patient  Topic Date Due    HEPATITIS B VACCINES (1 of 3 - Risk 3-dose series) 04/21/1954    INFLUENZA VACCINE  07/01/2019      Medicare Health Risk Assessment:     /64   Pulse 67   Temp 98 °F (36 7 °C)   Resp 16   Ht 5' 11" (1 803 m)   Wt 102 kg (225 lb)   SpO2 97%   BMI 31 38 kg/m²      Florin Che is here for his Subsequent Wellness visit  Last Medicare Wellness visit information reviewed, patient interviewed, no change since last AWV  Health Risk Assessment:   Patient rates overall health as good  Patient feels that their physical health rating is slightly worse  Eyesight was rated as same  Hearing was rated as same  Patient feels that their emotional and mental health rating is same  Pain experienced in the last 7 days has been none  Patient states that he has experienced no weight loss or gain in last 6 months  Depression Screening:   PHQ-2 Score: 0  PHQ-9 Score: 3      Fall Risk Screening: In the past year, patient has experienced: no history of falling in past year      Home Safety:  Patient does not have trouble with stairs inside or outside of their home  Patient has working smoke alarms and has working carbon monoxide detector  Home safety hazards include: none  Nutrition:   Current diet is Regular       Medications: Patient is currently taking over-the-counter supplements  OTC medications include: see medication list  Patient is able to manage medications  Activities of Daily Living (ADLs)/Instrumental Activities of Daily Living (IADLs):   Walk and transfer into and out of bed and chair?: Yes  Dress and groom yourself?: Yes    Bathe or shower yourself?: Yes    Feed yourself?  Yes  Do your laundry/housekeeping?: Yes  Manage your money, pay your bills and track your expenses?: Yes  Make your own meals?: Yes    Do your own shopping?: Yes    Previous Hospitalizations:   Any hospitalizations or ED visits within the last 12 months?: No      Advance Care Planning:   Living will: Yes    Advanced directive: Yes      Cognitive Screening:   Provider or family/friend/caregiver concerned regarding cognition?: No    PREVENTIVE SCREENINGS      Cardiovascular Screening:    General: Screening Not Indicated and History Lipid Disorder      Diabetes Screening:     General: Screening Not Indicated and History Diabetes      Colorectal Cancer Screening:     General: Risks and Benefits Discussed      Prostate Cancer Screening:    General: Screening Not Indicated      Osteoporosis Screening:    General: Risks and Benefits Discussed and Patient Declines      Abdominal Aortic Aneurysm (AAA) Screening:        General: Screening Not Indicated      Lung Cancer Screening:     General: Screening Not Indicated      Hepatitis C Screening:    General: Screening Not Indicated      Isai Olivier DO

## 2019-10-11 NOTE — PROGRESS NOTES
Assessment/Plan:    1  Diabetic polyneuropathy associated with type 2 diabetes mellitus (HCC)  -     simvastatin (ZOCOR) 20 mg tablet; TAKE ONE TABLET DAILY AT  BEDTIME  -     metFORMIN (GLUCOPHAGE) 1000 MG tablet; Take 1 tablet (1,000 mg total) by mouth 2 (two) times a day with meals  -     Hemoglobin A1C; Future; Expected date: 11/11/2019  -     Comprehensive metabolic panel; Future; Expected date: 12/25/2019  -     Hemoglobin A1C; Future; Expected date: 12/25/2019    2  Peripheral arteriosclerosis ([address] )    3  Mixed hyperlipidemia  -     simvastatin (ZOCOR) 20 mg tablet; TAKE ONE TABLET DAILY AT  BEDTIME    4  Benign essential hypertension    5  Need for vaccination  -     influenza vaccine, high-dose, PF 0 5 mL    6  Obesity (BMI 30 0-34 9)    7  BMI 31 0-31 9,adult    8  Chronic kidney disease (CKD) stage G3a/A1, moderately decreased glomerular filtration rate (GFR) between 45-59 mL/min/1 73 square meter and albuminuria creatinine ratio less than 30 mg/g (HCC)    9  Hypercholesterolemia          BMI Counseling: Body mass index is 31 38 kg/m²  Discussed the patient's BMI with him  The BMI is above normal  Nutrition recommendations include reducing portion sizes  Patient Instructions       Obesity   AMBULATORY CARE:   Obesity  is when your body mass index (BMI) is greater than 30  Your healthcare provider will use your height and weight to measure your BMI  The risks of obesity include  many health problems, such as injuries or physical disability  You may need tests to check for the following:  · Diabetes     · High blood pressure or high cholesterol     · Heart disease     · Gallbladder or liver disease     · Cancer of the colon, breast, prostate, liver, or kidney     · Sleep apnea     · Arthritis or gout  Seek care immediately if:   · You have a severe headache, confusion, or difficulty speaking  · You have weakness on one side of your body       · You have chest pain, sweating, or shortness of breath  Contact your healthcare provider if:   · You have symptoms of gallbladder or liver disease, such as pain in your upper abdomen  · You have knee or hip pain and discomfort while walking  · You have symptoms of diabetes, such as intense hunger and thirst, and frequent urination  · You have symptoms of sleep apnea, such as snoring or daytime sleepiness  · You have questions or concerns about your condition or care  Treatment for obesity  focuses on helping you lose weight to improve your health  Even a small decrease in BMI can reduce the risk for many health problems  Your healthcare provider will help you set a weight-loss goal   · Lifestyle changes  are the first step in treating obesity  These include making healthy food choices and getting regular physical activity  Your healthcare provider may suggest a weight-loss program that involves coaching, education, and therapy  · Medicine  may help you lose weight when it is used with a healthy diet and physical activity  · Surgery  can help you lose weight if you are very obese and have other health problems  There are several types of weight-loss surgery  Ask your healthcare provider for more information  Be successful losing weight:   · Set small, realistic goals  An example of a small goal is to walk for 20 minutes 5 days a week  Anther goal is to lose 5% of your body weight  · Tell friends, family members, and coworkers about your goals  and ask for their support  Ask a friend to lose weight with you, or join a weight-loss support group  · Identify foods or triggers that may cause you to overeat , and find ways to avoid them  Remove tempting high-calorie foods from your home and workplace  Place a bowl of fresh fruit on your kitchen counter  If stress causes you to eat, then find other ways to cope with stress  · Keep a diary to track what you eat and drink    Also write down how many minutes of physical activity you do each day  Weigh yourself once a week and record it in your diary  Eating changes: You will need to eat 500 to 1,000 fewer calories each day than you currently eat to lose 1 to 2 pounds a week  The following changes will help you cut calories:  · Eat smaller portions  Use small plates, no larger than 9 inches in diameter  Fill your plate half full of fruits and vegetables  Measure your food using measuring cups until you know what a serving size looks like  · Eat 3 meals and 1 or 2 snacks each day  Plan your meals in advance  Della Jeffers and eat at home most of the time  Eat slowly  · Eat fruits and vegetables at every meal   They are low in calories and high in fiber, which makes you feel full  Do not add butter, margarine, or cream sauce to vegetables  Use herbs to season steamed vegetables  · Eat less fat and fewer fried foods  Eat more baked or grilled chicken and fish  These protein sources are lower in calories and fat than red meat  Limit fast food  Dress your salads with olive oil and vinegar instead of bottled dressing  · Limit the amount of sugar you eat  Do not drink sugary beverages  Limit alcohol  Activity changes:  Physical activity is good for your body in many ways  It helps you burn calories and build strong muscles  It decreases stress and depression, and improves your mood  It can also help you sleep better  Talk to your healthcare provider before you begin an exercise program   · Exercise for at least 30 minutes 5 days a week  Start slowly  Set aside time each day for physical activity that you enjoy and that is convenient for you  It is best to do both weight training and an activity that increases your heart rate, such as walking, bicycling, or swimming  · Find ways to be more active  Do yard work and housecleaning  Walk up the stairs instead of using elevators  Spend your leisure time going to events that require walking, such as outdoor festivals or fairs   This extra physical activity can help you lose weight and keep it off  Follow up with your healthcare provider as directed: You may need to meet with a dietitian  Write down your questions so you remember to ask them during your visits  © 2017 [address] [employer] [employer] Information is for End User's use only and may not be sold, redistributed or otherwise used for commercial purposes  All illustrations and images included in CareNotes® are the copyrighted property of [employer]  or [facility]  The above information is an  only  It is not intended as medical advice for individual conditions or treatments  Talk to your doctor, nurse or pharmacist before following any medical regimen to see if it is safe and effective for you  Weight Management   AMBULATORY CARE:   Why it is important to manage your weight:  Being overweight increases your risk of health conditions such as heart disease, high blood pressure, type 2 diabetes, and certain types of cancer  It can also increase your risk for osteoarthritis, sleep apnea, and other respiratory problems  Aim for a slow, steady weight loss  Even a small amount of weight loss can lower your risk of health problems  How to lose weight safely:  A safe and healthy way to lose weight is to eat fewer calories and get regular exercise  You can lose up about 1 pound a week by decreasing the number of calories you eat by 500 calories each day  You can decrease calories by eating smaller portion sizes or by cutting out high-calorie foods  Read labels to find out how many calories are in the foods you eat  You can also burn calories with exercise such as walking, swimming, or biking  You will be more likely to keep weight off if you make these changes part of your lifestyle  Healthy meal plan for weight management:  A healthy meal plan includes a variety of foods, contains fewer calories, and helps you stay healthy   A healthy meal plan includes the following:  · Eat whole-grain foods more often  A healthy meal plan should contain fiber  Fiber is the part of grains, fruits, and vegetables that is not broken down by your body  Whole-grain foods are healthy and provide extra fiber in your diet  Some examples of whole-grain foods are whole-wheat breads and pastas, oatmeal, brown rice, and bulgur  · Eat a variety of vegetables every day  Include dark, leafy greens such as spinach, kale, regan greens, and mustard greens  Eat yellow and orange vegetables such as carrots, sweet potatoes, and winter squash  · Eat a variety of fruits every day  Choose fresh or canned fruit (canned in its own juice or light syrup) instead of juice  Fruit juice has very little or no fiber  · Eat low-fat dairy foods  Drink fat-free (skim) milk or 1% milk  Eat fat-free yogurt and low-fat cottage cheese  Try low-fat cheeses such as mozzarella and other reduced-fat cheeses  · Choose meat and other protein foods that are low in fat  Choose beans or other legumes such as split peas or lentils  Choose fish, skinless poultry (chicken or turkey), or lean cuts of red meat (beef or pork)  Before you cook meat or poultry, cut off any visible fat  · Use less fat and oil  Try baking foods instead of frying them  Add less fat, such as margarine, sour cream, regular salad dressing and mayonnaise to foods  Eat fewer high-fat foods  Some examples of high-fat foods include french fries, doughnuts, ice cream, and cakes  · Eat fewer sweets  Limit foods and drinks that are high in sugar  This includes candy, cookies, regular soda, and sweetened drinks  Ways to decrease calories:   · Eat smaller portions  ¨ Use a small plate with smaller servings  ¨ Do not eat second helpings  ¨ When you eat at a restaurant, ask for a box and place half of your meal in the box before you eat  ¨ Share an entrée with someone else      · Replace high-calorie snacks with healthy, low-calorie snacks  ¨ Choose fresh fruit, vegetables, fat-free rice cakes, or air-popped popcorn instead of potato chips, nuts, or chocolate  ¨ Choose water or calorie-free drinks instead of soda or sweetened drinks  · Eat regular meals  Skipping meals can lead to overeating later in the day  Eat a healthy snack in place of a meal if you do not have time to eat a regular meal      · Do not shop for groceries when you are hungry  You may be more likely to make unhealthy food choices  Take a grocery list of healthy foods and shop after you have eaten  Exercise:  Exercise at least 30 minutes per day on most days of the week  Some examples of exercise include walking, biking, dancing, and swimming  You can also fit in more physical activity by taking the stairs instead of the elevator or parking farther away from stores  Ask your healthcare provider about the best exercise plan for you  Other things to consider as you try to lose weight:   · Be aware of situations that may give you the urge to overeat, such as eating while watching television  Find ways to avoid these situations  For example, read a book, go for a walk, or do crafts  · Meet with a weight loss support group or friends who are also trying to lose weight  This may help you stay motivated to continue working on your weight loss goals  © 2017 [employer] [employer] Information is for End User's use only and may not be sold, redistributed or otherwise used for commercial purposes  All illustrations and images included in CareNotes® are the copyrighted property of [employer]  or [employer]  The above information is an  only  It is not intended as medical advice for individual conditions or treatments  Talk to your doctor, nurse or pharmacist before following any medical regimen to see if it is safe and effective for you      Medicare Preventive Visit Patient Instructions  Thank you for completing your [facility] Visit or Medicare Annual Wellness Visit today  Your next wellness visit will be due in one year (10/11/2020)  The screening/preventive services that you may require over the next 5-10 years are detailed below  Some tests may not apply to you based off risk factors and/or age  Screening tests ordered at today's visit but not completed yet may show as past due  Also, please note that scanned in results may not display below  Preventive Screenings:  Service Recommendations Previous Testing/Comments   Colorectal Cancer Screening  · Colonoscopy    · Fecal Occult Blood Test (FOBT)/Fecal Immunochemical Test (FIT)  · Fecal DNA/Cologuard Test  · Flexible Sigmoidoscopy Age: 54-65 years old   Colonoscopy: every 10 years (May be performed more frequently if at higher risk)  OR  FOBT/FIT: every 1 year  OR  Cologuard: every 3 years  OR  Sigmoidoscopy: every 5 years  Screening may be recommended earlier than age 48 if at higher risk for colorectal cancer  Also, an individualized decision between you and your healthcare provider will decide whether screening between the ages of 74-80 would be appropriate  Colonoscopy: Not on file  FOBT/FIT: Not on file  Cologuard: Not on file  Sigmoidoscopy: Not on file         Prostate Cancer Screening Individualized decision between patient and health care provider in men between ages of 53-78   Medicare will cover every 12 months beginning on the day after your 50th birthday PSA: 1 7 ng/mL     Screening Not Indicated     Hepatitis C Screening Once for adults born between 1945 and 1965  More frequently in patients at high risk for Hepatitis C Hep C Antibody: Not on file       Diabetes Screening 1-2 times per year if you're at risk for diabetes or have pre-diabetes Fasting glucose: No results in last 5 years   A1C: 8 9 %    Screening Not Indicated  History Diabetes   Cholesterol Screening Once every 5 years if you don't have a lipid disorder   May order more often based on risk factors  Lipid panel: 10/07/2019    Screening Not Indicated  History Lipid Disorder      Other Preventive Screenings Covered by Medicare:  1  Abdominal Aortic Aneurysm (AAA) Screening: covered once if your at risk  You're considered to be at risk if you have a family history of AAA or a male between the age of 73-68 who smoking at least 100 cigarettes in your lifetime  2  Lung Cancer Screening: covers low dose CT scan once per year if you meet all of the following conditions: (1) Age 50-69; (2) No signs or symptoms of lung cancer; (3) Current smoker or have quit smoking within the last 15 years; (4) You have a tobacco smoking history of at least 30 pack years (packs per day x number of years you smoked); (5) You get a written order from a healthcare provider  3  Glaucoma Screening: covered annually if you're considered high risk: (1) You have diabetes OR (2) Family history of glaucoma OR (3)  aged 48 and older OR (3)  American aged 72 and older  3  Osteoporosis Screening: covered every 2 years if you meet one of the following conditions: (1) Have a vertebral abnormality; (2) On glucocorticoid therapy for more than 3 months; (3) Have primary hyperparathyroidism; (4) On osteoporosis medications and need to assess response to drug therapy  5  HIV Screening: covered annually if you're between the age of 12-76  Also covered annually if you are younger than 13 and older than 72 with risk factors for HIV infection  For pregnant patients, it is covered up to 3 times per pregnancy      Immunizations:  Immunization Recommendations   Influenza Vaccine Annual influenza vaccination during flu season is recommended for all persons aged >= 6 months who do not have contraindications   Pneumococcal Vaccine (Prevnar and Pneumovax)  * Prevnar = PCV13  * Pneumovax = PPSV23 Adults 25-60 years old: 1-3 doses may be recommended based on certain risk factors  Adults 72 years old: Prevnar (PCV13) vaccine recommended followed by Pneumovax (PPSV23) vaccine  If already received PPSV23 since turning 65, then PCV13 recommended at least one year after PPSV23 dose  Hepatitis B Vaccine 3 dose series if at intermediate or high risk (ex: diabetes, end stage renal disease, liver disease)   Tetanus (Td) Vaccine - COST NOT COVERED BY MEDICARE PART B Following completion of primary series, a booster dose should be given every 10 years to maintain immunity against tetanus  Td may also be given as tetanus wound prophylaxis  Tdap Vaccine - COST NOT COVERED BY MEDICARE PART B Recommended at least once for all adults  For pregnant patients, recommended with each pregnancy  Shingles Vaccine (Shingrix) - COST NOT COVERED BY MEDICARE PART B  2 shot series recommended in those aged 48 and above     Health Maintenance Due:  There are no preventive care reminders to display for this patient  Immunizations Due:      Topic Date Due    HEPATITIS B VACCINES (1 of 3 - Risk 3-dose series) 04/21/1954    INFLUENZA VACCINE  07/01/2019     Advance Directives   What are advance directives? Advance directives are legal documents that state your wishes and plans for medical care  These plans are made ahead of time in case you lose your ability to make decisions for yourself  Advance directives can apply to any medical decision, such as the treatments you want, and if you want to donate organs  What are the types of advance directives? There are many types of advance directives, and each state has rules about how to use them  You may choose a combination of any of the following:  · Living will: This is a written record of the treatment you want  You can also choose which treatments you do not want, which to limit, and which to stop at a certain time  This includes surgery, medicine, IV fluid, and tube feedings  · Durable power of  for healthcare [facility]):   This is a written record that states who you want to make healthcare choices for you when you are unable to make them for yourself  This person, called a proxy, is usually a family member or a friend  You may choose more than 1 proxy  · Do not resuscitate (DNR) order:  A DNR order is used in case your heart stops beating or you stop breathing  It is a request not to have certain forms of treatment, such as CPR  A DNR order may be included in other types of advance directives  · Medical directive: This covers the care that you want if you are in a coma, near death, or unable to make decisions for yourself  You can list the treatments you want for each condition  Treatment may include pain medicine, surgery, blood transfusions, dialysis, IV or tube feedings, and a ventilator (breathing machine)  · Values history: This document has questions about your views, beliefs, and how you feel and think about life  This information can help others choose the care that you would choose  Why are advance directives important? An advance directive helps you control your care  Although spoken wishes may be used, it is better to have your wishes written down  Spoken wishes can be misunderstood, or not followed  Treatments may be given even if you do not want them  An advance directive may make it easier for your family to make difficult choices about your care  Weight Management   Why it is important to manage your weight:  Being overweight increases your risk of health conditions such as heart disease, high blood pressure, type 2 diabetes, and certain types of cancer  It can also increase your risk for osteoarthritis, sleep apnea, and other respiratory problems  Aim for a slow, steady weight loss  Even a small amount of weight loss can lower your risk of health problems  How to lose weight safely:  A safe and healthy way to lose weight is to eat fewer calories and get regular exercise  You can lose up about 1 pound a week by decreasing the number of calories you eat by 500 calories each day  Healthy meal plan for weight management:  A healthy meal plan includes a variety of foods, contains fewer calories, and helps you stay healthy  A healthy meal plan includes the following:  · Eat whole-grain foods more often  A healthy meal plan should contain fiber  Fiber is the part of grains, fruits, and vegetables that is not broken down by your body  Whole-grain foods are healthy and provide extra fiber in your diet  Some examples of whole-grain foods are whole-wheat breads and pastas, oatmeal, brown rice, and bulgur  · Eat a variety of vegetables every day  Include dark, leafy greens such as spinach, kale, regan greens, and mustard greens  Eat yellow and orange vegetables such as carrots, sweet potatoes, and winter squash  · Eat a variety of fruits every day  Choose fresh or canned fruit (canned in its own juice or light syrup) instead of juice  Fruit juice has very little or no fiber  · Eat low-fat dairy foods  Drink fat-free (skim) milk or 1% milk  Eat fat-free yogurt and low-fat cottage cheese  Try low-fat cheeses such as mozzarella and other reduced-fat cheeses  · Choose meat and other protein foods that are low in fat  Choose beans or other legumes such as split peas or lentils  Choose fish, skinless poultry (chicken or turkey), or lean cuts of red meat (beef or pork)  Before you cook meat or poultry, cut off any visible fat  · Use less fat and oil  Try baking foods instead of frying them  Add less fat, such as margarine, sour cream, regular salad dressing and mayonnaise to foods  Eat fewer high-fat foods  Some examples of high-fat foods include french fries, doughnuts, ice cream, and cakes  · Eat fewer sweets  Limit foods and drinks that are high in sugar  This includes candy, cookies, regular soda, and sweetened drinks  Exercise:  Exercise at least 30 minutes per day on most days of the week  Some examples of exercise include walking, biking, dancing, and swimming   You can also fit in more physical activity by taking the stairs instead of the elevator or parking farther away from stores  Ask your healthcare provider about the best exercise plan for you  © Copyright [employer] 2018 Information is for End User's use only and may not be sold, redistributed or otherwise used for commercial purposes  All illustrations and images included in CareNotes® are the copyrighted property of [employer] Inc  or [address]      Return in about 3 months (around 1/11/2020) for Recheck  Subjective:      Patient ID: Cristi Faust is a 81 y o  male  Chief Complaint   Patient presents with    Follow-up     review Maribel Leyva       Pt Is here to follow up labs  Pt denies CP, no Sob no polyurioa no polydipsia      The following portions of the patient's history were reviewed and updated as appropriate: allergies, current medications, past family history, past medical history, past social history, past surgical history and problem list     Review of Systems   Constitutional: Positive for fatigue  Negative for activity change, appetite change, chills, diaphoresis, fever and unexpected weight change  HENT: Negative for congestion, dental problem, ear pain, mouth sores, sinus pressure, sinus pain, sore throat and trouble swallowing  Eyes: Negative for photophobia, discharge and itching  Respiratory: Negative for apnea, chest tightness and shortness of breath  Cardiovascular: Negative for chest pain, palpitations and leg swelling  Gastrointestinal: Positive for diarrhea  Negative for abdominal distention, abdominal pain, blood in stool, nausea and vomiting  Endocrine: Negative for cold intolerance, heat intolerance, polydipsia, polyphagia and polyuria  Genitourinary: Negative for difficulty urinating  Musculoskeletal: Negative for arthralgias  Skin: Negative for color change and wound  Neurological: Negative for dizziness, syncope, speech difficulty and headaches     Hematological: Negative for adenopathy  Psychiatric/Behavioral: Negative for agitation and behavioral problems  Current Outpatient Medications   Medication Sig Dispense Refill    amLODIPine-benazepril (LOTREL 5-20) 5-20 MG per capsule Take 1 capsule by mouth daily 30 capsule 0    Bioflavonoid Products (ZULEYKA-C) 500-200-60 MG TABS Take by mouth      carvedilol (COREG CR) 20 MG 24 hr capsule Take 1 capsule (20 mg total) by mouth daily 30 capsule 0    Cinnamon 500 MG TABS Take by mouth      fluticasone (FLONASE) 50 mcg/act nasal spray into each nostril      glucose blood (FREESTYLE LITE) test strip by In Vitro route      hydrocortisone 1 % cream Apply topically 4 (four) times a day as needed for irritation 120 g 0    Magnesium Oxide 140 MG CAPS Take by mouth      Multiple Vitamins-Iron (DAILY MULTIPLE VITAMIN/IRON PO) Take by mouth      olopatadine (PATANOL) 0 1 % ophthalmic solution Apply 1 drop to eye 2 (two) times a day      Omega-3 Fatty Acids (FISH OIL) 1000 MG CPDR Take by mouth      potassium chloride (K-DUR,KLOR-CON) 20 mEq tablet TAKE 2 TABLETS BY MOUTH 2  TIMES A  tablet 1    simvastatin (ZOCOR) 20 mg tablet TAKE ONE TABLET DAILY AT  BEDTIME 90 tablet 3    triamterene-hydrochlorothiazide (MAXZIDE-25) 37 5-25 mg per tablet TAKE ONE-HALF (1/2) TABLET  DAILY 45 tablet 3    VIIBRYD 20 MG tablet TAKE 1 TABLET BY MOUTH EVERY DAY 30 tablet 2    metFORMIN (GLUCOPHAGE) 1000 MG tablet Take 1 tablet (1,000 mg total) by mouth 2 (two) times a day with meals 180 tablet 1    mupirocin (BACTROBAN) 2 % cream Apply topically 2 (two) times a day       No current facility-administered medications for this visit  Objective:    /64   Pulse 67   Temp 98 °F (36 7 °C)   Resp 16   Ht 5' 11" (1 803 m)   Wt 102 kg (225 lb)   SpO2 97%   BMI 31 38 kg/m²        Physical Exam   Constitutional: He appears well-developed and well-nourished  No distress  HENT:   Head: Normocephalic and atraumatic  Right Ear: External ear normal    Left Ear: External ear normal    Nose: Nose normal    Mouth/Throat: Oropharynx is clear and moist  No oropharyngeal exudate  Eyes: Pupils are equal, round, and reactive to light  EOM are normal  Right eye exhibits no discharge  Left eye exhibits no discharge  No scleral icterus  Neck: No thyromegaly present  Cardiovascular: Normal rate and normal heart sounds  No murmur heard  Pulmonary/Chest: Effort normal and breath sounds normal  No respiratory distress  He has no wheezes  Abdominal: Soft  Bowel sounds are normal  He exhibits no distension and no mass  There is no tenderness  There is no rebound and no guarding  Musculoskeletal: Normal range of motion  Neurological: He is alert  He displays normal reflexes  Coordination normal    Skin: Skin is warm and dry  No rash noted  He is not diaphoretic  No erythema  Psychiatric: He has a normal mood and affect  His behavior is normal    Nursing note and vitals reviewed             Recent Results (from the past 672 hour(s))   Olga Byrd Default    Collection Time: 10/07/19  9:29 AM   Result Value Ref Range    White Blood Cell Count 5 4 3 4 - 10 8 x10E3/uL    Red Blood Cell Count 4 60 4 14 - 5 80 x10E6/uL    Hemoglobin 14 7 13 0 - 17 7 g/dL    HCT 42 0 37 5 - 51 0 %    MCV 91 79 - 97 fL    MCH 32 0 26 6 - 33 0 pg    MCHC 35 0 31 5 - 35 7 g/dL    RDW 13 5 12 3 - 15 4 %    Platelet Count 834 (L) 150 - 450 x10E3/uL    Neutrophils 56 Not Estab  %    Lymphocytes 31 Not Estab  %    Monocytes 10 Not Estab  %    Eosinophils 3 Not Estab  %    Basophils PCT 0 Not Estab  %    Neutrophils (Absolute) 3 0 1 4 - 7 0 x10E3/uL    Lymphocytes (Absolute) 1 7 0 7 - 3 1 x10E3/uL    Monocytes (Absolute) 0 6 0 1 - 0 9 x10E3/uL    Eosinophils (Absolute) 0 2 0 0 - 0 4 x10E3/uL    Basophils ABS 0 0 0 0 - 0 2 x10E3/uL    Immature Granulocytes 0 Not Estab  %    Immature Granulocytes (Absolute) 0 0 0 0 - 0 1 x10E3/uL   Comprehensive metabolic panel Collection Time: 10/07/19  9:29 AM   Result Value Ref Range    Glucose, Random 210 (H) 65 - 99 mg/dL    BUN 17 8 - 27 mg/dL    Creatinine 1 31 (H) 0 76 - 1 27 mg/dL    eGFR Non African American 50 (L) >59 mL/min/1 73    eGFR  58 (L) >59 mL/min/1 73    SL AMB BUN/CREATININE RATIO 13 10 - 24    Sodium 140 134 - 144 mmol/L    Potassium 4 3 3 5 - 5 2 mmol/L    Chloride 100 96 - 106 mmol/L    CO2 21 20 - 29 mmol/L    CALCIUM 9 3 8 6 - 10 2 mg/dL    Protein, Total 6 7 6 0 - 8 5 g/dL    Albumin 4 3 3 5 - 4 7 g/dL    Globulin, Total 2 4 1 5 - 4 5 g/dL    Albumin/Globulin Ratio 1 8 1 2 - 2 2    TOTAL BILIRUBIN 0 7 0 0 - 1 2 mg/dL    Alk Phos Isoenzymes 57 39 - 117 IU/L    AST 27 0 - 40 IU/L    ALT 32 0 - 44 IU/L   Lipid panel    Collection Time: 10/07/19  9:29 AM   Result Value Ref Range    Cholesterol, Total 144 100 - 199 mg/dL    Triglycerides 270 (H) 0 - 149 mg/dL    HDL 40 >39 mg/dL    LDL Direct 50 0 - 99 mg/dL   Microalbumin / creatinine urine ratio    Collection Time: 10/07/19  9:29 AM   Result Value Ref Range    Creatinine, Urine 147 4 Not Estab  mg/dL    Microalbum  ,U,Random 12 9 Not Estab  ug/mL    Microalb/Creat Ratio 8 8 0 0 - 30 0 mg/g creat   Cardiovascular Report    Collection Time: 10/07/19  9:29 AM   Result Value Ref Range    Interpretation Note     PDF Image Not applicable    [facility] CKD Program    Collection Time: 10/07/19  9:29 AM   Result Value Ref Range    Interpretation Note    Hemoglobin A1c (w/out EAG)    Collection Time: 10/07/19  9:29 AM   Result Value Ref Range    Hemoglobin A1C 8 9 (H) 4 8 - 5 6 %   TSH, 3rd generation    Collection Time: 10/07/19  9:29 AM   Result Value Ref Range    TSH 2 060 0 450 - 4 500 uIU/mL   PSA Total, Diagnostic    Collection Time: 10/07/19  9:29 AM   Result Value Ref Range    Prostate Specific Antigen Total 1 7 0 0 - 4 0 ng/mL         Teo Boles DO  BMI Counseling: Body mass index is 31 38 kg/m²   The BMI is above normal  Nutrition recommendations include reducing portion sizes

## 2019-10-11 NOTE — PATIENT INSTRUCTIONS
Obesity   AMBULATORY CARE:   Obesity  is when your body mass index (BMI) is greater than 30  Your healthcare provider will use your height and weight to measure your BMI  The risks of obesity include  many health problems, such as injuries or physical disability  You may need tests to check for the following:  · Diabetes     · High blood pressure or high cholesterol     · Heart disease     · Gallbladder or liver disease     · Cancer of the colon, breast, prostate, liver, or kidney     · Sleep apnea     · Arthritis or gout  Seek care immediately if:   · You have a severe headache, confusion, or difficulty speaking  · You have weakness on one side of your body  · You have chest pain, sweating, or shortness of breath  Contact your healthcare provider if:   · You have symptoms of gallbladder or liver disease, such as pain in your upper abdomen  · You have knee or hip pain and discomfort while walking  · You have symptoms of diabetes, such as intense hunger and thirst, and frequent urination  · You have symptoms of sleep apnea, such as snoring or daytime sleepiness  · You have questions or concerns about your condition or care  Treatment for obesity  focuses on helping you lose weight to improve your health  Even a small decrease in BMI can reduce the risk for many health problems  Your healthcare provider will help you set a weight-loss goal   · Lifestyle changes  are the first step in treating obesity  These include making healthy food choices and getting regular physical activity  Your healthcare provider may suggest a weight-loss program that involves coaching, education, and therapy  · Medicine  may help you lose weight when it is used with a healthy diet and physical activity  · Surgery  can help you lose weight if you are very obese and have other health problems  There are several types of weight-loss surgery  Ask your healthcare provider for more information    Be successful losing weight:   · Set small, realistic goals  An example of a small goal is to walk for 20 minutes 5 days a week  Anther goal is to lose 5% of your body weight  · Tell friends, family members, and coworkers about your goals  and ask for their support  Ask a friend to lose weight with you, or join a weight-loss support group  · Identify foods or triggers that may cause you to overeat , and find ways to avoid them  Remove tempting high-calorie foods from your home and workplace  Place a bowl of fresh fruit on your kitchen counter  If stress causes you to eat, then find other ways to cope with stress  · Keep a diary to track what you eat and drink  Also write down how many minutes of physical activity you do each day  Weigh yourself once a week and record it in your diary  Eating changes: You will need to eat 500 to 1,000 fewer calories each day than you currently eat to lose 1 to 2 pounds a week  The following changes will help you cut calories:  · Eat smaller portions  Use small plates, no larger than 9 inches in diameter  Fill your plate half full of fruits and vegetables  Measure your food using measuring cups until you know what a serving size looks like  · Eat 3 meals and 1 or 2 snacks each day  Plan your meals in advance  Vladimir Domínguez and eat at home most of the time  Eat slowly  · Eat fruits and vegetables at every meal   They are low in calories and high in fiber, which makes you feel full  Do not add butter, margarine, or cream sauce to vegetables  Use herbs to season steamed vegetables  · Eat less fat and fewer fried foods  Eat more baked or grilled chicken and fish  These protein sources are lower in calories and fat than red meat  Limit fast food  Dress your salads with olive oil and vinegar instead of bottled dressing  · Limit the amount of sugar you eat  Do not drink sugary beverages  Limit alcohol  Activity changes:  Physical activity is good for your body in many ways   It helps you burn calories and build strong muscles  It decreases stress and depression, and improves your mood  It can also help you sleep better  Talk to your healthcare provider before you begin an exercise program   · Exercise for at least 30 minutes 5 days a week  Start slowly  Set aside time each day for physical activity that you enjoy and that is convenient for you  It is best to do both weight training and an activity that increases your heart rate, such as walking, bicycling, or swimming  · Find ways to be more active  Do yard work and housecleaning  Walk up the stairs instead of using elevators  Spend your leisure time going to events that require walking, such as outdoor festivals or fairs  This extra physical activity can help you lose weight and keep it off  Follow up with your healthcare provider as directed: You may need to meet with a dietitian  Write down your questions so you remember to ask them during your visits  © 2017 [employer] Information is for End User's use only and may not be sold, redistributed or otherwise used for commercial purposes  All illustrations and images included in CareNotes® are the copyrighted property of [employer] , Eureka King  or Royer Coulter  The above information is an  only  It is not intended as medical advice for individual conditions or treatments  Talk to your doctor, nurse or pharmacist before following any medical regimen to see if it is safe and effective for you  Weight Management   AMBULATORY CARE:   Why it is important to manage your weight:  Being overweight increases your risk of health conditions such as heart disease, high blood pressure, type 2 diabetes, and certain types of cancer  It can also increase your risk for osteoarthritis, sleep apnea, and other respiratory problems  Aim for a slow, steady weight loss  Even a small amount of weight loss can lower your risk of health problems    How to lose weight safely:  A safe and healthy way to lose weight is to eat fewer calories and get regular exercise  You can lose up about 1 pound a week by decreasing the number of calories you eat by 500 calories each day  You can decrease calories by eating smaller portion sizes or by cutting out high-calorie foods  Read labels to find out how many calories are in the foods you eat  You can also burn calories with exercise such as walking, swimming, or biking  You will be more likely to keep weight off if you make these changes part of your lifestyle  Healthy meal plan for weight management:  A healthy meal plan includes a variety of foods, contains fewer calories, and helps you stay healthy  A healthy meal plan includes the following:  · Eat whole-grain foods more often  A healthy meal plan should contain fiber  Fiber is the part of grains, fruits, and vegetables that is not broken down by your body  Whole-grain foods are healthy and provide extra fiber in your diet  Some examples of whole-grain foods are whole-wheat breads and pastas, oatmeal, brown rice, and bulgur  · Eat a variety of vegetables every day  Include dark, leafy greens such as spinach, kale, regan greens, and mustard greens  Eat yellow and orange vegetables such as carrots, sweet potatoes, and winter squash  · Eat a variety of fruits every day  Choose fresh or canned fruit (canned in its own juice or light syrup) instead of juice  Fruit juice has very little or no fiber  · Eat low-fat dairy foods  Drink fat-free (skim) milk or 1% milk  Eat fat-free yogurt and low-fat cottage cheese  Try low-fat cheeses such as mozzarella and other reduced-fat cheeses  · Choose meat and other protein foods that are low in fat  Choose beans or other legumes such as split peas or lentils  Choose fish, skinless poultry (chicken or turkey), or lean cuts of red meat (beef or pork)  Before you cook meat or poultry, cut off any visible fat  · Use less fat and oil  Try baking foods instead of frying them  Add less fat, such as margarine, sour cream, regular salad dressing and mayonnaise to foods  Eat fewer high-fat foods  Some examples of high-fat foods include french fries, doughnuts, ice cream, and cakes  · Eat fewer sweets  Limit foods and drinks that are high in sugar  This includes candy, cookies, regular soda, and sweetened drinks  Ways to decrease calories:   · Eat smaller portions  ¨ Use a small plate with smaller servings  ¨ Do not eat second helpings  ¨ When you eat at a restaurant, ask for a box and place half of your meal in the box before you eat  ¨ Share an entrée with someone else  · Replace high-calorie snacks with healthy, low-calorie snacks  ¨ Choose fresh fruit, vegetables, fat-free rice cakes, or air-popped popcorn instead of potato chips, nuts, or chocolate  ¨ Choose water or calorie-free drinks instead of soda or sweetened drinks  · Eat regular meals  Skipping meals can lead to overeating later in the day  Eat a healthy snack in place of a meal if you do not have time to eat a regular meal      · Do not shop for groceries when you are hungry  You may be more likely to make unhealthy food choices  Take a grocery list of healthy foods and shop after you have eaten  Exercise:  Exercise at least 30 minutes per day on most days of the week  Some examples of exercise include walking, biking, dancing, and swimming  You can also fit in more physical activity by taking the stairs instead of the elevator or parking farther away from stores  Ask your healthcare provider about the best exercise plan for you  Other things to consider as you try to lose weight:   · Be aware of situations that may give you the urge to overeat, such as eating while watching television  Find ways to avoid these situations  For example, read a book, go for a walk, or do crafts      · Meet with a weight loss support group or friends who are also trying to lose weight  This may help you stay motivated to continue working on your weight loss goals  © 2017 [employer] Information is for End User's use only and may not be sold, redistributed or otherwise used for commercial purposes  All illustrations and images included in CareNotes® are the copyrighted property of [employer]  or Royer Coulter  The above information is an  only  It is not intended as medical advice for individual conditions or treatments  Talk to your doctor, nurse or pharmacist before following any medical regimen to see if it is safe and effective for you  Medicare Preventive Visit Patient Instructions  Thank you for completing your Welcome to Medicare Visit or Medicare Annual Wellness Visit today  Your next wellness visit will be due in one year (10/11/2020)  The screening/preventive services that you may require over the next 5-10 years are detailed below  Some tests may not apply to you based off risk factors and/or age  Screening tests ordered at today's visit but not completed yet may show as past due  Also, please note that scanned in results may not display below  Preventive Screenings:  Service Recommendations Previous Testing/Comments   Colorectal Cancer Screening  · Colonoscopy    · Fecal Occult Blood Test (FOBT)/Fecal Immunochemical Test (FIT)  · Fecal DNA/Cologuard Test  · Flexible Sigmoidoscopy Age: 54-65 years old   Colonoscopy: every 10 years (May be performed more frequently if at higher risk)  OR  FOBT/FIT: every 1 year  OR  Cologuard: every 3 years  OR  Sigmoidoscopy: every 5 years  Screening may be recommended earlier than age 48 if at higher risk for colorectal cancer  Also, an individualized decision between you and your healthcare provider will decide whether screening between the ages of 74-80 would be appropriate   Colonoscopy: Not on file  FOBT/FIT: Not on file  Cologuard: Not on file  Sigmoidoscopy: Not on file Prostate Cancer Screening Individualized decision between patient and health care provider in men between ages of 53-78   Medicare will cover every 12 months beginning on the day after your 50th birthday PSA: 1 7 ng/mL     Screening Not Indicated     Hepatitis C Screening Once for adults born between 1945 and 1965  More frequently in patients at high risk for Hepatitis C Hep C Antibody: Not on file       Diabetes Screening 1-2 times per year if you're at risk for diabetes or have pre-diabetes Fasting glucose: No results in last 5 years   A1C: 8 9 %    Screening Not Indicated  History Diabetes   Cholesterol Screening Once every 5 years if you don't have a lipid disorder  May order more often based on risk factors  Lipid panel: 10/07/2019    Screening Not Indicated  History Lipid Disorder      Other Preventive Screenings Covered by Medicare:  1  Abdominal Aortic Aneurysm (AAA) Screening: covered once if your at risk  You're considered to be at risk if you have a family history of AAA or a male between the age of 73-68 who smoking at least 100 cigarettes in your lifetime  2  Lung Cancer Screening: covers low dose CT scan once per year if you meet all of the following conditions: (1) Age 50-69; (2) No signs or symptoms of lung cancer; (3) Current smoker or have quit smoking within the last 15 years; (4) You have a tobacco smoking history of at least 30 pack years (packs per day x number of years you smoked); (5) You get a written order from a healthcare provider  3  Glaucoma Screening: covered annually if you're considered high risk: (1) You have diabetes OR (2) Family history of glaucoma OR (3)  aged 48 and older OR (3)  American aged 72 and older  3   Osteoporosis Screening: covered every 2 years if you meet one of the following conditions: (1) Have a vertebral abnormality; (2) On glucocorticoid therapy for more than 3 months; (3) Have primary hyperparathyroidism; (4) On osteoporosis medications and need to assess response to drug therapy  5  HIV Screening: covered annually if you're between the age of 12-76  Also covered annually if you are younger than 13 and older than 72 with risk factors for HIV infection  For pregnant patients, it is covered up to 3 times per pregnancy  Immunizations:  Immunization Recommendations   Influenza Vaccine Annual influenza vaccination during flu season is recommended for all persons aged >= 6 months who do not have contraindications   Pneumococcal Vaccine (Prevnar and Pneumovax)  * Prevnar = PCV13  * Pneumovax = PPSV23 Adults 25-60 years old: 1-3 doses may be recommended based on certain risk factors  Adults 72 years old: Prevnar (PCV13) vaccine recommended followed by Pneumovax (PPSV23) vaccine  If already received PPSV23 since turning 65, then PCV13 recommended at least one year after PPSV23 dose  Hepatitis B Vaccine 3 dose series if at intermediate or high risk (ex: diabetes, end stage renal disease, liver disease)   Tetanus (Td) Vaccine - COST NOT COVERED BY MEDICARE PART B Following completion of primary series, a booster dose should be given every 10 years to maintain immunity against tetanus  Td may also be given as tetanus wound prophylaxis  Tdap Vaccine - COST NOT COVERED BY MEDICARE PART B Recommended at least once for all adults  For pregnant patients, recommended with each pregnancy  Shingles Vaccine (Shingrix) - COST NOT COVERED BY MEDICARE PART B  2 shot series recommended in those aged 48 and above     Health Maintenance Due:  There are no preventive care reminders to display for this patient  Immunizations Due:      Topic Date Due    HEPATITIS B VACCINES (1 of 3 - Risk 3-dose series) 04/21/1954    INFLUENZA VACCINE  07/01/2019     Advance Directives   What are advance directives? Advance directives are legal documents that state your wishes and plans for medical care   These plans are made ahead of time in case you lose your ability to make decisions for yourself  Advance directives can apply to any medical decision, such as the treatments you want, and if you want to donate organs  What are the types of advance directives? There are many types of advance directives, and each state has rules about how to use them  You may choose a combination of any of the following:  · Living will: This is a written record of the treatment you want  You can also choose which treatments you do not want, which to limit, and which to stop at a certain time  This includes surgery, medicine, IV fluid, and tube feedings  · Durable power of  for healthcare [facility]): This is a written record that states who you want to make healthcare choices for you when you are unable to make them for yourself  This person, called a proxy, is usually a family member or a friend  You may choose more than 1 proxy  · Do not resuscitate (DNR) order:  A DNR order is used in case your heart stops beating or you stop breathing  It is a request not to have certain forms of treatment, such as CPR  A DNR order may be included in other types of advance directives  · Medical directive: This covers the care that you want if you are in a coma, near death, or unable to make decisions for yourself  You can list the treatments you want for each condition  Treatment may include pain medicine, surgery, blood transfusions, dialysis, IV or tube feedings, and a ventilator (breathing machine)  · Values history: This document has questions about your views, beliefs, and how you feel and think about life  This information can help others choose the care that you would choose  Why are advance directives important? An advance directive helps you control your care  Although spoken wishes may be used, it is better to have your wishes written down  Spoken wishes can be misunderstood, or not followed  Treatments may be given even if you do not want them   An advance directive may make it easier for your family to make difficult choices about your care  Weight Management   Why it is important to manage your weight:  Being overweight increases your risk of health conditions such as heart disease, high blood pressure, type 2 diabetes, and certain types of cancer  It can also increase your risk for osteoarthritis, sleep apnea, and other respiratory problems  Aim for a slow, steady weight loss  Even a small amount of weight loss can lower your risk of health problems  How to lose weight safely:  A safe and healthy way to lose weight is to eat fewer calories and get regular exercise  You can lose up about 1 pound a week by decreasing the number of calories you eat by 500 calories each day  Healthy meal plan for weight management:  A healthy meal plan includes a variety of foods, contains fewer calories, and helps you stay healthy  A healthy meal plan includes the following:  · Eat whole-grain foods more often  A healthy meal plan should contain fiber  Fiber is the part of grains, fruits, and vegetables that is not broken down by your body  Whole-grain foods are healthy and provide extra fiber in your diet  Some examples of whole-grain foods are whole-wheat breads and pastas, oatmeal, brown rice, and bulgur  · Eat a variety of vegetables every day  Include dark, leafy greens such as spinach, kale, regan greens, and mustard greens  Eat yellow and orange vegetables such as carrots, sweet potatoes, and winter squash  · Eat a variety of fruits every day  Choose fresh or canned fruit (canned in its own juice or light syrup) instead of juice  Fruit juice has very little or no fiber  · Eat low-fat dairy foods  Drink fat-free (skim) milk or 1% milk  Eat fat-free yogurt and low-fat cottage cheese  Try low-fat cheeses such as mozzarella and other reduced-fat cheeses  · Choose meat and other protein foods that are low in fat  Choose beans or other legumes such as split peas or lentils   Choose fish, skinless poultry (chicken or turkey), or lean cuts of red meat (beef or pork)  Before you cook meat or poultry, cut off any visible fat  · Use less fat and oil  Try baking foods instead of frying them  Add less fat, such as margarine, sour cream, regular salad dressing and mayonnaise to foods  Eat fewer high-fat foods  Some examples of high-fat foods include french fries, doughnuts, ice cream, and cakes  · Eat fewer sweets  Limit foods and drinks that are high in sugar  This includes candy, cookies, regular soda, and sweetened drinks  Exercise:  Exercise at least 30 minutes per day on most days of the week  Some examples of exercise include walking, biking, dancing, and swimming  You can also fit in more physical activity by taking the stairs instead of the elevator or parking farther away from stores  Ask your healthcare provider about the best exercise plan for you  © Copyright [employer] 2018 Information is for End User's use only and may not be sold, redistributed or otherwise used for commercial purposes   All illustrations and images included in CareNotes® are the copyrighted property of A [employer] A [employer]  or [address] [place] [address]

## 2019-10-14 RX ORDER — TRIAMTERENE AND HYDROCHLOROTHIAZIDE 37.5; 25 MG/1; MG/1
TABLET ORAL
Qty: 45 TABLET | Refills: 3 | Status: SHIPPED | OUTPATIENT
Start: 2019-10-14 | End: 2020-09-08

## 2019-11-12 LAB — HBA1C MFR BLD: 7.6 % (ref 4.8–5.6)

## 2019-11-29 DIAGNOSIS — R05.9 COUGH: Primary | ICD-10-CM

## 2019-11-29 RX ORDER — GUAIFENESIN AND CODEINE PHOSPHATE 100; 10 MG/5ML; MG/5ML
10 SOLUTION ORAL
Qty: 120 ML | Refills: 0 | Status: SHIPPED | OUTPATIENT
Start: 2019-11-29 | End: 2020-09-08

## 2019-12-11 ENCOUNTER — OFFICE VISIT (OUTPATIENT)
Dept: PODIATRY | Facility: CLINIC | Age: 84
End: 2019-12-11
Payer: COMMERCIAL

## 2019-12-11 VITALS
HEIGHT: 71 IN | DIASTOLIC BLOOD PRESSURE: 71 MMHG | SYSTOLIC BLOOD PRESSURE: 130 MMHG | RESPIRATION RATE: 17 BRPM | WEIGHT: 225 LBS | BODY MASS INDEX: 31.5 KG/M2 | HEART RATE: 69 BPM

## 2019-12-11 DIAGNOSIS — M79.671 PAIN IN BOTH FEET: ICD-10-CM

## 2019-12-11 DIAGNOSIS — L84 CORNS: ICD-10-CM

## 2019-12-11 DIAGNOSIS — M79.672 PAIN IN BOTH FEET: ICD-10-CM

## 2019-12-11 DIAGNOSIS — I70.209 PERIPHERAL ARTERIOSCLEROSIS (HCC): ICD-10-CM

## 2019-12-11 DIAGNOSIS — E11.42 DIABETIC POLYNEUROPATHY ASSOCIATED WITH TYPE 2 DIABETES MELLITUS (HCC): Primary | ICD-10-CM

## 2019-12-11 PROCEDURE — 11056 PARNG/CUTG B9 HYPRKR LES 2-4: CPT | Performed by: PODIATRIST

## 2020-01-07 DIAGNOSIS — E11.42 DIABETIC POLYNEUROPATHY ASSOCIATED WITH TYPE 2 DIABETES MELLITUS (HCC): ICD-10-CM

## 2020-01-07 DIAGNOSIS — I10 BENIGN ESSENTIAL HYPERTENSION: ICD-10-CM

## 2020-01-07 DIAGNOSIS — E78.2 MIXED HYPERLIPIDEMIA: ICD-10-CM

## 2020-01-07 RX ORDER — AMLODIPINE BESYLATE AND BENAZEPRIL HYDROCHLORIDE 5; 20 MG/1; MG/1
1 CAPSULE ORAL DAILY
Qty: 90 CAPSULE | Refills: 3 | Status: SHIPPED | OUTPATIENT
Start: 2020-01-07 | End: 2020-12-14

## 2020-01-07 RX ORDER — CARVEDILOL PHOSPHATE 20 MG/1
CAPSULE, EXTENDED RELEASE ORAL
Qty: 90 CAPSULE | Refills: 3 | Status: SHIPPED | OUTPATIENT
Start: 2020-01-07 | End: 2021-02-22

## 2020-01-07 RX ORDER — SIMVASTATIN 20 MG
TABLET ORAL
Qty: 90 TABLET | Refills: 3 | Status: SHIPPED | OUTPATIENT
Start: 2020-01-07 | End: 2020-09-08 | Stop reason: SDUPTHER

## 2020-01-17 LAB
ALBUMIN SERPL-MCNC: 4.6 G/DL (ref 3.5–4.7)
ALBUMIN/GLOB SERPL: 2.2 {RATIO} (ref 1.2–2.2)
ALP SERPL-CCNC: 55 IU/L (ref 39–117)
ALT SERPL-CCNC: 19 IU/L (ref 0–44)
AST SERPL-CCNC: 21 IU/L (ref 0–40)
BILIRUB SERPL-MCNC: 0.6 MG/DL (ref 0–1.2)
BUN SERPL-MCNC: 19 MG/DL (ref 8–27)
BUN/CREAT SERPL: 14 (ref 10–24)
CALCIUM SERPL-MCNC: 9.4 MG/DL (ref 8.6–10.2)
CHLORIDE SERPL-SCNC: 104 MMOL/L (ref 96–106)
CO2 SERPL-SCNC: 20 MMOL/L (ref 20–29)
CREAT SERPL-MCNC: 1.38 MG/DL (ref 0.76–1.27)
GLOBULIN SER-MCNC: 2.1 G/DL (ref 1.5–4.5)
GLUCOSE SERPL-MCNC: 129 MG/DL (ref 65–99)
HBA1C MFR BLD: 6.6 % (ref 4.8–5.6)
MICRODELETION SYND BLD/T FISH: NORMAL
POTASSIUM SERPL-SCNC: 4 MMOL/L (ref 3.5–5.2)
PROT SERPL-MCNC: 6.7 G/DL (ref 6–8.5)
SL AMB EGFR AFRICAN AMERICAN: 54 ML/MIN/1.73
SL AMB EGFR NON AFRICAN AMERICAN: 47 ML/MIN/1.73
SODIUM SERPL-SCNC: 141 MMOL/L (ref 134–144)

## 2020-01-18 DIAGNOSIS — E11.42 DIABETIC POLYNEUROPATHY ASSOCIATED WITH TYPE 2 DIABETES MELLITUS (HCC): Primary | ICD-10-CM

## 2020-01-18 RX ORDER — METFORMIN HYDROCHLORIDE 500 MG/1
500 TABLET, EXTENDED RELEASE ORAL
Qty: 90 TABLET | Refills: 1 | Status: SHIPPED | OUTPATIENT
Start: 2020-01-18 | End: 2020-04-11 | Stop reason: SDUPTHER

## 2020-01-23 ENCOUNTER — OFFICE VISIT (OUTPATIENT)
Dept: FAMILY MEDICINE CLINIC | Facility: CLINIC | Age: 85
End: 2020-01-23
Payer: COMMERCIAL

## 2020-01-23 VITALS
TEMPERATURE: 97.6 F | WEIGHT: 220 LBS | OXYGEN SATURATION: 98 % | HEIGHT: 71 IN | RESPIRATION RATE: 16 BRPM | SYSTOLIC BLOOD PRESSURE: 130 MMHG | DIASTOLIC BLOOD PRESSURE: 70 MMHG | BODY MASS INDEX: 30.8 KG/M2 | HEART RATE: 76 BPM

## 2020-01-23 DIAGNOSIS — N18.31 CHRONIC KIDNEY DISEASE (CKD) STAGE G3A/A1, MODERATELY DECREASED GLOMERULAR FILTRATION RATE (GFR) BETWEEN 45-59 ML/MIN/1.73 SQUARE METER AND ALBUMINURIA CREATININE RATIO LESS THAN 30 MG/G (HCC): ICD-10-CM

## 2020-01-23 DIAGNOSIS — I70.209 PERIPHERAL ARTERIOSCLEROSIS (HCC): ICD-10-CM

## 2020-01-23 DIAGNOSIS — I10 BENIGN ESSENTIAL HYPERTENSION: ICD-10-CM

## 2020-01-23 DIAGNOSIS — F32.A MINOR DEPRESSION: ICD-10-CM

## 2020-01-23 DIAGNOSIS — E78.2 MIXED HYPERLIPIDEMIA: ICD-10-CM

## 2020-01-23 DIAGNOSIS — E11.42 DIABETIC POLYNEUROPATHY ASSOCIATED WITH TYPE 2 DIABETES MELLITUS (HCC): Primary | ICD-10-CM

## 2020-01-23 PROCEDURE — 99214 OFFICE O/P EST MOD 30 MIN: CPT | Performed by: FAMILY MEDICINE

## 2020-01-23 PROCEDURE — 3078F DIAST BP <80 MM HG: CPT | Performed by: FAMILY MEDICINE

## 2020-01-23 PROCEDURE — 3288F FALL RISK ASSESSMENT DOCD: CPT | Performed by: FAMILY MEDICINE

## 2020-01-23 PROCEDURE — 1160F RVW MEDS BY RX/DR IN RCRD: CPT | Performed by: FAMILY MEDICINE

## 2020-01-23 PROCEDURE — 1101F PT FALLS ASSESS-DOCD LE1/YR: CPT | Performed by: FAMILY MEDICINE

## 2020-01-23 PROCEDURE — 3075F SYST BP GE 130 - 139MM HG: CPT | Performed by: FAMILY MEDICINE

## 2020-01-23 NOTE — PROGRESS NOTES
Assessment/Plan:    1  Diabetic polyneuropathy associated with type 2 diabetes mellitus (HCC)  -     Comprehensive metabolic panel; Future; Expected date: 04/07/2020  -     Hemoglobin A1C; Future; Expected date: 04/07/2020    2  Benign essential hypertension  Assessment & Plan:  Pt will take 1/2 maxzide  - he was actually taking a full pill  We will follow kidney function      3  Chronic kidney disease (CKD) stage G3a/A1, moderately decreased glomerular filtration rate (GFR) between 45-59 mL/min/1 73 square meter and albuminuria creatinine ratio less than 30 mg/g ([facility])    4  Mixed hyperlipidemia  -     Lipid Panel with Direct LDL reflex; Future; Expected date: 04/07/2020    5  Minor depression    6  Peripheral arteriosclerosis ([facility] [address] )          There are no Patient Instructions on file for this visit  Return in about 3 months (around 4/23/2020) for Recheck  Subjective:      Patient ID: Sis Estrada is a 81 y o  male  Chief Complaint   Patient presents with    Follow-up     [facility]       Pt is here for a diabetic follow up  Pt denies CP, no SOB no polyuria no polydipsia    Pt states the depression has been good      The following portions of the patient's history were reviewed and updated as appropriate: allergies, current medications, past family history, past medical history, past social history, past surgical history and problem list     Review of Systems   Constitutional: Negative for activity change, appetite change, chills, diaphoresis, fatigue, fever and unexpected weight change  HENT: Negative for congestion, dental problem, ear pain, mouth sores, sinus pressure, sinus pain, sore throat and trouble swallowing  Eyes: Negative for photophobia, discharge and itching  Respiratory: Negative for apnea, chest tightness and shortness of breath  Cardiovascular: Negative for chest pain, palpitations and leg swelling  Gastrointestinal: Positive for diarrhea   Negative for abdominal distention, abdominal pain, blood in stool, nausea and vomiting  Endocrine: Negative for cold intolerance, heat intolerance, polydipsia, polyphagia and polyuria  Genitourinary: Negative for difficulty urinating  Musculoskeletal: Negative for arthralgias  Skin: Negative for color change and wound  Neurological: Negative for dizziness, syncope, speech difficulty and headaches  Hematological: Negative for adenopathy  Psychiatric/Behavioral: Negative for agitation and behavioral problems           Current Outpatient Medications   Medication Sig Dispense Refill    amLODIPine-benazepril (LOTREL 5-20) 5-20 MG per capsule TAKE 1 CAPSULE BY MOUTH  DAILY 90 capsule 3    Bioflavonoid Products (ZULEYKA-C) 500-200-60 MG TABS Take by mouth      carvedilol (COREG CR) 20 MG 24 hr capsule TAKE 1 CAPSULE BY MOUTH  DAILY 90 capsule 3    Cinnamon 500 MG TABS Take by mouth      fluticasone (FLONASE) 50 mcg/act nasal spray into each nostril      glucose blood (FREESTYLE LITE) test strip by In Vitro route      guaifenesin-codeine (GUAIFENESIN AC) 100-10 MG/5ML liquid Take 10 mL by mouth daily at bedtime as needed for cough 120 mL 0    hydrocortisone 1 % cream Apply topically 4 (four) times a day as needed for irritation 120 g 0    Magnesium Oxide 140 MG CAPS Take by mouth      metFORMIN (GLUCOPHAGE-XR) 500 mg 24 hr tablet Take 1 tablet (500 mg total) by mouth daily with dinner 90 tablet 1    Multiple Vitamins-Iron (DAILY MULTIPLE VITAMIN/IRON PO) Take by mouth      mupirocin (BACTROBAN) 2 % cream Apply topically 2 (two) times a day      olopatadine (PATANOL) 0 1 % ophthalmic solution Apply 1 drop to eye 2 (two) times a day      Omega-3 Fatty Acids (FISH OIL) 1000 MG CPDR Take by mouth      potassium chloride (K-DUR,KLOR-CON) 20 mEq tablet TAKE 2 TABLETS BY MOUTH 2  TIMES A  tablet 1    simvastatin (ZOCOR) 20 mg tablet TAKE 1 TABLET BY MOUTH  DAILY AT BEDTIME 90 tablet 3    sitaGLIPtin (JANUVIA) 50 mg tablet Take 1 tablet (50 mg total) by mouth daily 90 tablet 1    triamterene-hydrochlorothiazide (MAXZIDE-25) 37 5-25 mg per tablet TAKE ONE-HALF (1/2) TABLET  BY MOUTH DAILY 45 tablet 3    VIIBRYD 20 MG tablet TAKE 1 TABLET BY MOUTH EVERY DAY 30 tablet 2     No current facility-administered medications for this visit  Objective:    /70   Pulse 76   Temp 97 6 °F (36 4 °C)   Resp 16   Ht 5' 11" (1 803 m)   Wt 99 8 kg (220 lb)   SpO2 98%   BMI 30 68 kg/m²        Physical Exam   Constitutional: He appears well-developed and well-nourished  No distress  HENT:   Head: Normocephalic and atraumatic  Right Ear: External ear normal    Left Ear: External ear normal    Nose: Nose normal    Mouth/Throat: Oropharynx is clear and moist  No oropharyngeal exudate  Eyes: Pupils are equal, round, and reactive to light  EOM are normal  Right eye exhibits no discharge  Left eye exhibits no discharge  No scleral icterus  Neck: No thyromegaly present  Cardiovascular: Normal rate and normal heart sounds  Pulses are no weak pulses  No murmur heard  Pulses:       Dorsalis pedis pulses are 2+ on the right side, and 2+ on the left side  Posterior tibial pulses are 2+ on the right side, and 2+ on the left side  Pulmonary/Chest: Effort normal and breath sounds normal  No respiratory distress  He has no wheezes  Abdominal: Soft  Bowel sounds are normal  He exhibits no distension and no mass  There is no tenderness  There is no rebound and no guarding  Musculoskeletal: Normal range of motion  Feet:   Right Foot:   Skin Integrity: Negative for ulcer, skin breakdown, erythema, warmth, callus or dry skin  Left Foot:   Skin Integrity: Negative for ulcer, skin breakdown, erythema, warmth, callus or dry skin  Neurological: He is alert  He displays normal reflexes  Coordination normal    Skin: Skin is warm and dry  No rash noted  He is not diaphoretic  No erythema  Psychiatric: He has a normal mood and affect  His behavior is normal    Nursing note and vitals reviewed  Diabetic Foot Exam    Patient's shoes and socks removed  Right Foot/Ankle   Right Foot Inspection  Skin Exam: skin normal skin not intact, no dry skin, no warmth, no callus, no erythema, no maceration, no abnormal color, no pre-ulcer, no ulcer and no callus                          Toe Exam: ROM and strength within normal limits  Sensory   Vibration: intact  Proprioception: intact   Monofilament testing: intact  Vascular  Capillary refills: < 3 seconds  The right DP pulse is 2+  The right PT pulse is 2+  Left Foot/Ankle  Left Foot Inspection  Skin Exam: skin normalskin not intact, no dry skin, no warmth, no erythema, no maceration, normal color, no pre-ulcer, no ulcer and no callus                         Toe Exam: ROM and strength within normal limits                   Sensory   Vibration: intact  Proprioception: intact  Monofilament: intact  Vascular  Capillary refills: < 3 seconds  The left DP pulse is 2+  The left PT pulse is 2+  Assign Risk Category:  No deformity present; No loss of protective sensation;  No weak pulses       Risk: 0    Recent Results (from the past 672 hour(s))   Comprehensive metabolic panel    Collection Time: 01/16/20  9:30 AM   Result Value Ref Range    Glucose, Random 129 (H) 65 - 99 mg/dL    BUN 19 8 - 27 mg/dL    Creatinine 1 38 (H) 0 76 - 1 27 mg/dL    eGFR Non  47 (L) >59 mL/min/1 73    eGFR  54 (L) >59 mL/min/1 73    SL AMB BUN/CREATININE RATIO 14 10 - 24    Sodium 141 134 - 144 mmol/L    Potassium 4 0 3 5 - 5 2 mmol/L    Chloride 104 96 - 106 mmol/L    CO2 20 20 - 29 mmol/L    CALCIUM 9 4 8 6 - 10 2 mg/dL    Protein, Total 6 7 6 0 - 8 5 g/dL    Albumin 4 6 3 5 - 4 7 g/dL    Globulin, Total 2 1 1 5 - 4 5 g/dL    Albumin/Globulin Ratio 2 2 1 2 - 2 2    TOTAL BILIRUBIN 0 6 0 0 - 1 2 mg/dL    Alk Phos Isoenzymes 55 39 - 117 IU/L    AST 21 0 - 40 IU/L    ALT 19 0 - 44 IU/L   [facility] Kidney Stone Panel    Collection Time: 01/16/20  9:30 AM   Result Value Ref Range    Interpretation Note    Hemoglobin A1c (w/out EAG)    Collection Time: 01/16/20  9:30 AM   Result Value Ref Range    Hemoglobin A1C 6 6 (H) 4 8 - 5 6 %         Erna Wright DO

## 2020-02-12 ENCOUNTER — OFFICE VISIT (OUTPATIENT)
Dept: PODIATRY | Facility: CLINIC | Age: 85
End: 2020-02-12
Payer: COMMERCIAL

## 2020-02-12 VITALS
HEIGHT: 71 IN | DIASTOLIC BLOOD PRESSURE: 79 MMHG | RESPIRATION RATE: 17 BRPM | SYSTOLIC BLOOD PRESSURE: 136 MMHG | WEIGHT: 220 LBS | HEART RATE: 67 BPM | BODY MASS INDEX: 30.8 KG/M2

## 2020-02-12 DIAGNOSIS — M79.671 PAIN IN BOTH FEET: ICD-10-CM

## 2020-02-12 DIAGNOSIS — I70.209 PERIPHERAL ARTERIOSCLEROSIS (HCC): ICD-10-CM

## 2020-02-12 DIAGNOSIS — E11.42 DIABETIC POLYNEUROPATHY ASSOCIATED WITH TYPE 2 DIABETES MELLITUS (HCC): Primary | ICD-10-CM

## 2020-02-12 DIAGNOSIS — M79.672 PAIN IN BOTH FEET: ICD-10-CM

## 2020-02-12 DIAGNOSIS — L84 CORNS: ICD-10-CM

## 2020-02-12 PROCEDURE — 11056 PARNG/CUTG B9 HYPRKR LES 2-4: CPT | Performed by: PODIATRIST

## 2020-02-12 NOTE — PROGRESS NOTES
Assessment   Diabetic neuropathy   Pain upon ambulation   Peripheral artery disease   Mycosis of nail   Callus formation   Foot deformity      Plan   Foot exam performed   Patient educated on care of the diabetic foot   All mycotic nails debrided   Calluses debrided without pain or complication         Subjective:  patient, a diabetic white male, complains of pain in his feet with ambulation   No history of trauma     History of Present Illness  HPI: Patient is a diabetic with pain in his feet with ambulation  No history of trauma  Patient is pre-ulcerative calluses  These hurt with ambulation       Review of Systems   Constitutional: feeling poorly, but-- as noted in HPI,-- no fever-- and-- no chills    ENT: nasal discharge, but-- as noted in HPI   Cardiovascular: no complaints of slow or fast heart rate, no chest pain, no palpitations, no leg claudication or lower extremity edema   Respiratory: cough, but-- as noted in HPI   Gastrointestinal: no complaints of abdominal pain, no constipation, no nausea or vomiting, no diarrhea or bloody stools   Genitourinary: no complaints of dysuria or incontinence, no hesitancy, no nocturia, no genital lesion, no inadequacy of penile erection   Musculoskeletal: no complaints of arthralgia, no myalgia, no joint swelling or stiffness, no limb pain or swelling   Integumentary: no complaints of skin rash or lesion, no itching or dry skin, no skin wounds       Active Problems  1  Abnormal EKG (794 31) (R94 31)   2  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)   3  Acute knee pain, right   4  Basal cell carcinoma of skin (173 91) (C44 91)   5  Benign essential hypertension (401 1) (I10)   6  BMI 31 0-31 9,adult (V85 31) (Z68 31)   7  Callus (700) (L84)   8  Cough (786 2) (R05)   9  Deformity of ankle and foot, acquired (736 70) (M21 969)   10  Diabetes mellitus with neuropathy (250 60,357 2) (E11 40)   11  Diabetic neuropathy (250 60,357 2) (E11 40)   12  Dizziness (780 4) (R42)   13  Encounter for prostate cancer screening (V76 44) (Z12 5)   14  Encounter for screening for cardiovascular disorders (V81 2) (Z13 6)   15  Encounter for screening for malignant neoplasm of colon (V76 51) (Z12 11)   16  Encounter for screening for malignant neoplasm of prostate (V76 44) (Z12 5)   17  Hypercholesterolemia (272 0) (E78 00)   18  Hypokalemia (276 8) (E87 6)   19  Limb pain (729 5) (M79 609)   20  Medicare annual wellness visit, initial (V70 0) (Z00 00)   21  Minor depression (311) (F32 9)   22  Need for vaccination (V05 9) (Z23)   23  Never a smoker   24  Non-healing ulcer (707 9) (L98 499)   25  Obesity, Class I, BMI 30-34 9 (278 00) (E66 9)   26  Onychomycosis (110 1) (B35 1)   27  Open wound of foot, left, subsequent encounter (V58 89,892 0) (S91 302D)   28  Pain in both feet (729 5) (M79 671,M79 672)   29  Primary dysthymia (300 4) (F34 1)   30  Screening for hypothyroidism (V77 0) (Z13 29)   31  Wound of skin (782 9) (R23 8)     Past Medical History   · History of Acute upper respiratory infection (465 9) (J06 9)   · History of Ascending cholangitis (576 1) (K83 0)   · History of Atherosclerosis of arteries of extremities (440 20) (I70 209)   · History of Bug bite with infection (919 5,E906 4) (W57 XXXA)   · History of Callus (700) (L84)   · History of Need for vaccination (V05 9) (Z23)   · Screening for genitourinary condition (V81 6) (Z13 89)   · History of Screening for genitourinary condition (V81 6) (Z13 89)     The active problems and past medical history were reviewed and updated today       Surgical History      · History of Appendectomy   · History of Cholecystectomy   · History of Open Treatment Of Fracture Of The Radial Shaft   · History of Tonsillectomy     The surgical history was reviewed and updated today        Family History  Mother    · Family history of Heart disease   · Family history of HTN (hypertension)   · Family history of Liver cancer   · Family history of Melanoma  Father    · Family history of    · Family history of Gastric ulcer  Brother    · Family history of Diabetes  Grandparent    · Family history of Diabetes     Social History      · Never a smoker  The social history was reviewed and updated today     The medication list was reviewed and updated today       Allergies  1  No Known Drug Allergies         Physical Exam  Left Foot: Appearance: Normal except as noted: excessive pronation-- and-- pes planus  Great toe deformities include a bunion  Tenderness: None except the great toe  Right Foot: Appearance: Normal except as noted: excessive pronation-- and-- pes planus  Great toe deformities include a bunion  Tenderness: None except the great toe  Left Ankle: ROM: limited ROM in all planes Motor: diffuse weakness  Right Ankle: ROM: limited ROM in all planes Motor: diffuse weakness  Neurological Exam: performed  Light touch was decreased bilaterally  Vibratory sensation was decreased in both first metatarsophalangeal joints  Vascular Exam: performed Dorsalis pedis pulses were One/4 bilaterally  Posterior tibial pulses were One/4 bilaterally  Elevation Pallor: present bilaterally  Capillary refill time was greater than 3 seconds bilaterally-- and-- Q 9, findings bilateral  Negative digital hair  Edema: mild bilaterally-- and-- No Homans sign  Toenails: All of the toenails were elongated,-- hypertrophied,-- discolored,-- shown to have subungual debris-- and-- Mycotic     Socks and shoes removed, Right Foot Findings: erythematous and dry  Diminished tactile sensation with monofilament testing throughout the right foot   Socks and shoes removed, Left Foot Findings: erythematous and dry   Diminished tactile sensation with monofilament testing throughout the left foot  Capillary refills findings on the right were delayed in the toes   Pulses:  1+ in the posterior tibialis on the right  1+ in the dorsalis pedis on the right   Capillary refills findings on the left were delayed in the toes   Pulses:  1+ in the posterior tibialis on the left  1+ in the dorsalis pedis on the left   Assign Risk Category: 2: Loss of protective sensation with or without weakness, deformity, callus, pre-ulcer, or history of ulceration  High risk  Hyperkeratosis: present on both first toes,-- present on both first sub metatarsals-- and-- Pinch callus, bilateral    Shoe Gear Evaluation: performed ()  Right Foot: width: E -- and-- length: 12  Left Foot: width: E -- and-- length: 12  Recommendation(s): custom inlays      Patient's shoes and socks removed  Right Foot/Ankle   Right Foot Inspection  Skin Exam: callus and callus               Toe Exam: swelling and erythema  Sensory   Vibration: diminished  Proprioception: diminished      Vascular  Capillary refills: elevated  The right DP pulse is 1+  The right PT pulse is 1+       Left Foot/Ankle  Left Foot Inspection  Skin Exam: callus              Toe Exam: swelling and erythema                   Sensory   Vibration: diminished  Proprioception: diminished     Vascular  Capillary refills: elevated  The left DP pulse is 1+  The left PT pulse is 1+      Patient's shoes and socks removed  Assign Risk Category:  Deformity present;  Loss of protective sensation; Weak pulses       Risk: 2

## 2020-03-27 DIAGNOSIS — F32.A MINOR DEPRESSION: ICD-10-CM

## 2020-03-27 RX ORDER — VILAZODONE HYDROCHLORIDE 20 MG/1
TABLET ORAL
Qty: 30 TABLET | Refills: 2 | Status: SHIPPED | OUTPATIENT
Start: 2020-03-27 | End: 2020-04-11 | Stop reason: SDUPTHER

## 2020-04-11 DIAGNOSIS — F32.A MINOR DEPRESSION: ICD-10-CM

## 2020-04-11 DIAGNOSIS — E11.42 DIABETIC POLYNEUROPATHY ASSOCIATED WITH TYPE 2 DIABETES MELLITUS (HCC): ICD-10-CM

## 2020-04-11 RX ORDER — VILAZODONE HYDROCHLORIDE 20 MG/1
20 TABLET ORAL DAILY
Qty: 90 TABLET | Refills: 1 | Status: SHIPPED | OUTPATIENT
Start: 2020-04-11 | End: 2020-08-12

## 2020-04-11 RX ORDER — METFORMIN HYDROCHLORIDE 500 MG/1
500 TABLET, EXTENDED RELEASE ORAL
Qty: 90 TABLET | Refills: 1 | Status: SHIPPED | OUTPATIENT
Start: 2020-04-11 | End: 2020-08-12

## 2020-04-13 DIAGNOSIS — I10 BENIGN ESSENTIAL HYPERTENSION: ICD-10-CM

## 2020-04-14 RX ORDER — POTASSIUM CHLORIDE 20 MEQ/1
TABLET, EXTENDED RELEASE ORAL
Qty: 360 TABLET | Refills: 1 | Status: SHIPPED | OUTPATIENT
Start: 2020-04-14 | End: 2020-09-08 | Stop reason: SDUPTHER

## 2020-06-12 ENCOUNTER — OFFICE VISIT (OUTPATIENT)
Dept: PODIATRY | Facility: CLINIC | Age: 85
End: 2020-06-12
Payer: COMMERCIAL

## 2020-06-12 VITALS
BODY MASS INDEX: 30.8 KG/M2 | DIASTOLIC BLOOD PRESSURE: 79 MMHG | RESPIRATION RATE: 17 BRPM | HEIGHT: 71 IN | WEIGHT: 220 LBS | SYSTOLIC BLOOD PRESSURE: 136 MMHG

## 2020-06-12 DIAGNOSIS — I70.209 PERIPHERAL ARTERIOSCLEROSIS (HCC): ICD-10-CM

## 2020-06-12 DIAGNOSIS — E11.42 DIABETIC POLYNEUROPATHY ASSOCIATED WITH TYPE 2 DIABETES MELLITUS (HCC): Primary | ICD-10-CM

## 2020-06-12 DIAGNOSIS — M79.671 PAIN IN BOTH FEET: ICD-10-CM

## 2020-06-12 DIAGNOSIS — L84 CORNS: ICD-10-CM

## 2020-06-12 DIAGNOSIS — M79.672 PAIN IN BOTH FEET: ICD-10-CM

## 2020-06-12 PROCEDURE — 11056 PARNG/CUTG B9 HYPRKR LES 2-4: CPT | Performed by: PODIATRIST

## 2020-08-11 DIAGNOSIS — F32.A MINOR DEPRESSION: ICD-10-CM

## 2020-08-11 DIAGNOSIS — E11.42 DIABETIC POLYNEUROPATHY ASSOCIATED WITH TYPE 2 DIABETES MELLITUS (HCC): ICD-10-CM

## 2020-08-11 NOTE — TELEPHONE ENCOUNTER
Patient is due for an appt  When he calls back, schedule his AWV for October   [facility] [place] G[place]y [place], [place]

## 2020-08-11 NOTE — TELEPHONE ENCOUNTER
He needs an appt with Dr Saleem Evans   Last note states:     Return in about 3 months (around 4/23/2020) for [facility]

## 2020-08-12 DIAGNOSIS — B35.4 DERMATOPHYTOSIS OF BODY: Primary | ICD-10-CM

## 2020-08-12 RX ORDER — METFORMIN HYDROCHLORIDE 500 MG/1
TABLET, EXTENDED RELEASE ORAL
Qty: 90 TABLET | Refills: 0 | Status: SHIPPED | OUTPATIENT
Start: 2020-08-12 | End: 2020-10-23

## 2020-08-12 RX ORDER — SITAGLIPTIN 50 MG/1
TABLET, FILM COATED ORAL
Qty: 90 TABLET | Refills: 0 | Status: SHIPPED | OUTPATIENT
Start: 2020-08-12 | End: 2020-10-23

## 2020-08-12 RX ORDER — CLOTRIMAZOLE AND BETAMETHASONE DIPROPIONATE 10; .64 MG/G; MG/G
CREAM TOPICAL 2 TIMES DAILY
Qty: 30 G | Refills: 0 | Status: SHIPPED | OUTPATIENT
Start: 2020-08-12 | End: 2021-01-29 | Stop reason: SDUPTHER

## 2020-08-12 RX ORDER — VILAZODONE HYDROCHLORIDE 20 MG/1
TABLET ORAL
Qty: 90 TABLET | Refills: 0 | Status: SHIPPED | OUTPATIENT
Start: 2020-08-12 | End: 2021-02-10

## 2020-08-21 ENCOUNTER — OFFICE VISIT (OUTPATIENT)
Dept: PODIATRY | Facility: CLINIC | Age: 85
End: 2020-08-21
Payer: COMMERCIAL

## 2020-08-21 VITALS
DIASTOLIC BLOOD PRESSURE: 79 MMHG | WEIGHT: 220 LBS | RESPIRATION RATE: 17 BRPM | HEART RATE: 67 BPM | SYSTOLIC BLOOD PRESSURE: 136 MMHG | BODY MASS INDEX: 30.8 KG/M2 | HEIGHT: 71 IN

## 2020-08-21 DIAGNOSIS — L84 CORNS: ICD-10-CM

## 2020-08-21 DIAGNOSIS — E11.42 DIABETIC POLYNEUROPATHY ASSOCIATED WITH TYPE 2 DIABETES MELLITUS (HCC): Primary | ICD-10-CM

## 2020-08-21 DIAGNOSIS — M79.671 PAIN IN BOTH FEET: ICD-10-CM

## 2020-08-21 DIAGNOSIS — M79.672 PAIN IN BOTH FEET: ICD-10-CM

## 2020-08-21 DIAGNOSIS — I70.209 PERIPHERAL ARTERIOSCLEROSIS (HCC): ICD-10-CM

## 2020-08-21 PROCEDURE — 11056 PARNG/CUTG B9 HYPRKR LES 2-4: CPT | Performed by: PODIATRIST

## 2020-08-26 ENCOUNTER — TELEPHONE (OUTPATIENT)
Dept: FAMILY MEDICINE CLINIC | Facility: CLINIC | Age: 85
End: 2020-08-26

## 2020-08-26 LAB
ALBUMIN SERPL-MCNC: 4.7 G/DL (ref 3.6–4.6)
ALBUMIN/GLOB SERPL: 2 {RATIO} (ref 1.2–2.2)
ALP SERPL-CCNC: 53 IU/L (ref 39–117)
ALT SERPL-CCNC: 24 IU/L (ref 0–44)
AST SERPL-CCNC: 23 IU/L (ref 0–40)
BILIRUB SERPL-MCNC: 0.8 MG/DL (ref 0–1.2)
BUN SERPL-MCNC: 22 MG/DL (ref 8–27)
BUN/CREAT SERPL: 14 (ref 10–24)
CALCIUM SERPL-MCNC: 9.5 MG/DL (ref 8.6–10.2)
CHLORIDE SERPL-SCNC: 103 MMOL/L (ref 96–106)
CHOLEST SERPL-MCNC: 163 MG/DL (ref 100–199)
CO2 SERPL-SCNC: 21 MMOL/L (ref 20–29)
CREAT SERPL-MCNC: 1.57 MG/DL (ref 0.76–1.27)
GLOBULIN SER-MCNC: 2.4 G/DL (ref 1.5–4.5)
GLUCOSE SERPL-MCNC: 155 MG/DL (ref 65–99)
HBA1C MFR BLD: 7.3 % (ref 4.8–5.6)
HDLC SERPL-MCNC: 42 MG/DL
LDLC SERPL CALC-MCNC: 63 MG/DL (ref 0–99)
MICRODELETION SYND BLD/T FISH: NORMAL
MICRODELETION SYND BLD/T FISH: NORMAL
POTASSIUM SERPL-SCNC: 4.2 MMOL/L (ref 3.5–5.2)
PROT SERPL-MCNC: 7.1 G/DL (ref 6–8.5)
SL AMB EGFR AFRICAN AMERICAN: 46 ML/MIN/1.73
SL AMB EGFR NON AFRICAN AMERICAN: 40 ML/MIN/1.73
SL AMB PDF IMAGE: NORMAL
SODIUM SERPL-SCNC: 140 MMOL/L (ref 134–144)
TRIGL SERPL-MCNC: 290 MG/DL (ref 0–149)

## 2020-09-03 LAB
LEFT EYE DIABETIC RETINOPATHY: NORMAL
RIGHT EYE DIABETIC RETINOPATHY: NORMAL

## 2020-09-08 ENCOUNTER — OFFICE VISIT (OUTPATIENT)
Dept: FAMILY MEDICINE CLINIC | Facility: CLINIC | Age: 85
End: 2020-09-08
Payer: COMMERCIAL

## 2020-09-08 VITALS
BODY MASS INDEX: 31.36 KG/M2 | TEMPERATURE: 98.5 F | SYSTOLIC BLOOD PRESSURE: 130 MMHG | HEART RATE: 64 BPM | WEIGHT: 224 LBS | HEIGHT: 71 IN | RESPIRATION RATE: 18 BRPM | DIASTOLIC BLOOD PRESSURE: 70 MMHG

## 2020-09-08 DIAGNOSIS — E66.9 OBESITY (BMI 30.0-34.9): ICD-10-CM

## 2020-09-08 DIAGNOSIS — E11.42 DIABETIC POLYNEUROPATHY ASSOCIATED WITH TYPE 2 DIABETES MELLITUS (HCC): Primary | ICD-10-CM

## 2020-09-08 DIAGNOSIS — F32.A MINOR DEPRESSION: ICD-10-CM

## 2020-09-08 DIAGNOSIS — N18.31 CHRONIC KIDNEY DISEASE (CKD) STAGE G3A/A1, MODERATELY DECREASED GLOMERULAR FILTRATION RATE (GFR) BETWEEN 45-59 ML/MIN/1.73 SQUARE METER AND ALBUMINURIA CREATININE RATIO LESS THAN 30 MG/G (HCC): ICD-10-CM

## 2020-09-08 DIAGNOSIS — Z23 NEED FOR VACCINATION: ICD-10-CM

## 2020-09-08 DIAGNOSIS — E78.2 MIXED HYPERLIPIDEMIA: ICD-10-CM

## 2020-09-08 DIAGNOSIS — I10 BENIGN ESSENTIAL HYPERTENSION: ICD-10-CM

## 2020-09-08 PROCEDURE — 90662 IIV NO PRSV INCREASED AG IM: CPT

## 2020-09-08 PROCEDURE — 99214 OFFICE O/P EST MOD 30 MIN: CPT | Performed by: FAMILY MEDICINE

## 2020-09-08 PROCEDURE — 3078F DIAST BP <80 MM HG: CPT | Performed by: FAMILY MEDICINE

## 2020-09-08 PROCEDURE — 1036F TOBACCO NON-USER: CPT | Performed by: FAMILY MEDICINE

## 2020-09-08 PROCEDURE — 1160F RVW MEDS BY RX/DR IN RCRD: CPT | Performed by: FAMILY MEDICINE

## 2020-09-08 PROCEDURE — 3075F SYST BP GE 130 - 139MM HG: CPT | Performed by: FAMILY MEDICINE

## 2020-09-08 PROCEDURE — G0008 ADMIN INFLUENZA VIRUS VAC: HCPCS

## 2020-09-08 RX ORDER — HYDROCHLOROTHIAZIDE 12.5 MG/1
12.5 TABLET ORAL DAILY
Qty: 30 TABLET | Refills: 1 | Status: SHIPPED | OUTPATIENT
Start: 2020-09-08 | End: 2020-09-08 | Stop reason: SDUPTHER

## 2020-09-08 RX ORDER — SIMVASTATIN 10 MG
TABLET ORAL
Start: 2020-09-08 | End: 2020-09-14 | Stop reason: SDUPTHER

## 2020-09-08 RX ORDER — POTASSIUM CHLORIDE 20 MEQ/1
20 TABLET, EXTENDED RELEASE ORAL DAILY
Qty: 90 TABLET | Refills: 1
Start: 2020-09-08 | End: 2020-09-14 | Stop reason: SDUPTHER

## 2020-09-08 RX ORDER — HYDROCHLOROTHIAZIDE 12.5 MG/1
12.5 TABLET ORAL DAILY
Qty: 90 TABLET | Refills: 1 | Status: SHIPPED | OUTPATIENT
Start: 2020-09-08 | End: 2020-09-14 | Stop reason: SDUPTHER

## 2020-09-08 NOTE — PATIENT INSTRUCTIONS
Obesity   AMBULATORY CARE:   Obesity  is when your body mass index (BMI) is greater than 30  Your healthcare provider will use your height and weight to measure your BMI  The risks of obesity include  many health problems, such as injuries or physical disability  You may need tests to check for the following:  · Diabetes     · High blood pressure or high cholesterol     · Heart disease     · Gallbladder or liver disease     · Cancer of the colon, breast, prostate, liver, or kidney     · Sleep apnea     · Arthritis or gout  Seek care immediately if:   · You have a severe headache, confusion, or difficulty speaking  · You have weakness on one side of your body  · You have chest pain, sweating, or shortness of breath  Contact your healthcare provider if:   · You have symptoms of gallbladder or liver disease, such as pain in your upper abdomen  · You have knee or hip pain and discomfort while walking  · You have symptoms of diabetes, such as intense hunger and thirst, and frequent urination  · You have symptoms of sleep apnea, such as snoring or daytime sleepiness  · You have questions or concerns about your condition or care  Treatment for obesity  focuses on helping you lose weight to improve your health  Even a small decrease in BMI can reduce the risk for many health problems  Your healthcare provider will help you set a weight-loss goal   · Lifestyle changes  are the first step in treating obesity  These include making healthy food choices and getting regular physical activity  Your healthcare provider may suggest a weight-loss program that involves coaching, education, and therapy  · Medicine  may help you lose weight when it is used with a healthy diet and physical activity  · Surgery  can help you lose weight if you are very obese and have other health problems  There are several types of weight-loss surgery  Ask your healthcare provider for more information    Be successful losing weight:   · Set small, realistic goals  An example of a small goal is to walk for 20 minutes 5 days a week  Anther goal is to lose 5% of your body weight  · Tell friends, family members, and coworkers about your goals  and ask for their support  Ask a friend to lose weight with you, or join a weight-loss support group  · Identify foods or triggers that may cause you to overeat , and find ways to avoid them  Remove tempting high-calorie foods from your home and workplace  Place a bowl of fresh fruit on your kitchen counter  If stress causes you to eat, then find other ways to cope with stress  · Keep a diary to track what you eat and drink  Also write down how many minutes of physical activity you do each day  Weigh yourself once a week and record it in your diary  Eating changes: You will need to eat 500 to 1,000 fewer calories each day than you currently eat to lose 1 to 2 pounds a week  The following changes will help you cut calories:  · Eat smaller portions  Use small plates, no larger than 9 inches in diameter  Fill your plate half full of fruits and vegetables  Measure your food using measuring cups until you know what a serving size looks like  · Eat 3 meals and 1 or 2 snacks each day  Plan your meals in advance  Radha Jaelyn and eat at home most of the time  Eat slowly  · Eat fruits and vegetables at every meal   They are low in calories and high in fiber, which makes you feel full  Do not add butter, margarine, or cream sauce to vegetables  Use herbs to season steamed vegetables  · Eat less fat and fewer fried foods  Eat more baked or grilled chicken and fish  These protein sources are lower in calories and fat than red meat  Limit fast food  Dress your salads with olive oil and vinegar instead of bottled dressing  · Limit the amount of sugar you eat  Do not drink sugary beverages  Limit alcohol  Activity changes:  Physical activity is good for your body in many ways   It helps you burn calories and build strong muscles  It decreases stress and depression, and improves your mood  It can also help you sleep better  Talk to your healthcare provider before you begin an exercise program   · Exercise for at least 30 minutes 5 days a week  Start slowly  Set aside time each day for physical activity that you enjoy and that is convenient for you  It is best to do both weight training and an activity that increases your heart rate, such as walking, bicycling, or swimming  · Find ways to be more active  Do yard work and housecleaning  Walk up the stairs instead of using elevators  Spend your leisure time going to events that require walking, such as outdoor festivals or fairs  This extra physical activity can help you lose weight and keep it off  Follow up with your healthcare provider as directed: You may need to meet with a dietitian  Write down your questions so you remember to ask them during your visits  © 2017 [employer] Information is for End User's use only and may not be sold, redistributed or otherwise used for commercial purposes  All illustrations and images included in CareNotes® are the copyrighted property of [employer]  or [employer]  The above information is an  only  It is not intended as medical advice for individual conditions or treatments  Talk to your doctor, nurse or pharmacist before following any medical regimen to see if it is safe and effective for you

## 2020-09-08 NOTE — PROGRESS NOTES
Assessment/Plan:    1  Diabetic polyneuropathy associated with type 2 diabetes mellitus (HCC)  -     Hemoglobin A1C; Future; Expected date: 11/22/2020  -     Microalbumin / creatinine urine ratio; Future; Expected date: 11/22/2020  -     simvastatin (ZOCOR) 10 mg tablet; TAKE 1 TABLET BY MOUTH  DAILY AT BEDTIME    2  Need for vaccination  -     FLUZONE HIGH-DOSE: influenza vaccine, high-dose, preservative-free 0 7 mL    3  Benign essential hypertension  -     potassium chloride (K-DUR,KLOR-CON) 20 mEq tablet; Take 1 tablet (20 mEq total) by mouth daily  -     hydrochlorothiazide (HYDRODIURIL) 12 5 mg tablet; Take 1 tablet (12 5 mg total) by mouth daily    4  Chronic kidney disease (CKD) stage G3a/A1, moderately decreased glomerular filtration rate (GFR) between 45-59 mL/min/1 73 square meter and albuminuria creatinine ratio less than 30 mg/g ([facility])  -     Ambulatory referral to Nephrology; Future  -     Comprehensive metabolic panel; Future; Expected date: 10/08/2020  -     Comprehensive metabolic panel; Future; Expected date: 11/22/2020  -     CBC; Future; Expected date: 11/22/2020    5  Mixed hyperlipidemia  Assessment & Plan:  chnaged zocor to 10 mg a day    Orders:  -     Lipid Panel with Direct LDL reflex; Future; Expected date: 11/22/2020  -     simvastatin (ZOCOR) 10 mg tablet; TAKE 1 TABLET BY MOUTH  DAILY AT BEDTIME    6  Minor depression    7  Obesity (BMI 30 0-34 9)    8  BMI 31 0-31 9,adult    BMI Counseling: Body mass index is 31 24 kg/m²  The BMI is above normal  Nutrition recommendations include decreasing portion sizes  Exercise recommendations include moderate physical activity 150 minutes/week  No pharmacotherapy was ordered  Patient Instructions     Obesity   AMBULATORY CARE:   Obesity  is when your body mass index (BMI) is greater than 30  Your healthcare provider will use your height and weight to measure your BMI    The risks of obesity include  many health problems, such as injuries or physical disability  You may need tests to check for the following:  · Diabetes     · High blood pressure or high cholesterol     · Heart disease     · Gallbladder or liver disease     · Cancer of the colon, breast, prostate, liver, or kidney     · Sleep apnea     · Arthritis or gout  Seek care immediately if:   · You have a severe headache, confusion, or difficulty speaking  · You have weakness on one side of your body  · You have chest pain, sweating, or shortness of breath  Contact your healthcare provider if:   · You have symptoms of gallbladder or liver disease, such as pain in your upper abdomen  · You have knee or hip pain and discomfort while walking  · You have symptoms of diabetes, such as intense hunger and thirst, and frequent urination  · You have symptoms of sleep apnea, such as snoring or daytime sleepiness  · You have questions or concerns about your condition or care  Treatment for obesity  focuses on helping you lose weight to improve your health  Even a small decrease in BMI can reduce the risk for many health problems  Your healthcare provider will help you set a weight-loss goal   · Lifestyle changes  are the first step in treating obesity  These include making healthy food choices and getting regular physical activity  Your healthcare provider may suggest a weight-loss program that involves coaching, education, and therapy  · Medicine  may help you lose weight when it is used with a healthy diet and physical activity  · Surgery  can help you lose weight if you are very obese and have other health problems  There are several types of weight-loss surgery  Ask your healthcare provider for more information  Be successful losing weight:   · Set small, realistic goals  An example of a small goal is to walk for 20 minutes 5 days a week  Anther goal is to lose 5% of your body weight      · Tell friends, family members, and coworkers about your goals  and ask for their support  Ask a friend to lose weight with you, or join a weight-loss support group  · Identify foods or triggers that may cause you to overeat , and find ways to avoid them  Remove tempting high-calorie foods from your home and workplace  Place a bowl of fresh fruit on your kitchen counter  If stress causes you to eat, then find other ways to cope with stress  · Keep a diary to track what you eat and drink  Also write down how many minutes of physical activity you do each day  Weigh yourself once a week and record it in your diary  Eating changes: You will need to eat 500 to 1,000 fewer calories each day than you currently eat to lose 1 to 2 pounds a week  The following changes will help you cut calories:  · Eat smaller portions  Use small plates, no larger than 9 inches in diameter  Fill your plate half full of fruits and vegetables  Measure your food using measuring cups until you know what a serving size looks like  · Eat 3 meals and 1 or 2 snacks each day  Plan your meals in advance  Supa Henao and eat at home most of the time  Eat slowly  · Eat fruits and vegetables at every meal   They are low in calories and high in fiber, which makes you feel full  Do not add butter, margarine, or cream sauce to vegetables  Use herbs to season steamed vegetables  · Eat less fat and fewer fried foods  Eat more baked or grilled chicken and fish  These protein sources are lower in calories and fat than red meat  Limit fast food  Dress your salads with olive oil and vinegar instead of bottled dressing  · Limit the amount of sugar you eat  Do not drink sugary beverages  Limit alcohol  Activity changes:  Physical activity is good for your body in many ways  It helps you burn calories and build strong muscles  It decreases stress and depression, and improves your mood  It can also help you sleep better   Talk to your healthcare provider before you begin an exercise program   · Exercise for at least 30 minutes 5 days a week  Start slowly  Set aside time each day for physical activity that you enjoy and that is convenient for you  It is best to do both weight training and an activity that increases your heart rate, such as walking, bicycling, or swimming  · Find ways to be more active  Do yard work and housecleaning  Walk up the stairs instead of using elevators  Spend your leisure time going to events that require walking, such as outdoor festivals or fairs  This extra physical activity can help you lose weight and keep it off  Follow up with your healthcare provider as directed: You may need to meet with a dietitian  Write down your questions so you remember to ask them during your visits  © 2017 [employer]  Information is for End User's use only and may not be sold, redistributed or otherwise used for commercial purposes  All illustrations and images included in CareNotes® are the copyrighted property of A D [employer] M [employer] Inc  or [employer]  The above information is an  only  It is not intended as medical advice for individual conditions or treatments  Talk to your doctor, nurse or pharmacist before following any medical regimen to see if it is safe and effective for you  Return in about 3 months (around 12/8/2020) for Recheck before the new year  Subjective:      Patient ID: Hernan Leyva is a 81 y o  male      Chief Complaint   Patient presents with    Follow-up     medication ac/cma     Results    Blood Pressure Check       Pt is here to follow up DM  No CP, no SOB  No polyuria no polydipsia      The following portions of the patient's history were reviewed and updated as appropriate: allergies, current medications, past family history, past medical history, past social history, past surgical history and problem list     Review of Systems   Constitutional: Negative for activity change, appetite change, chills, diaphoresis, fatigue, fever and unexpected weight change  HENT: Negative for congestion, dental problem, ear pain, mouth sores, sinus pressure, sinus pain, sore throat and trouble swallowing  Eyes: Negative for photophobia, discharge and itching  Respiratory: Negative for apnea, chest tightness and shortness of breath  Cardiovascular: Negative for chest pain, palpitations and leg swelling  Gastrointestinal: Negative for abdominal distention, abdominal pain, blood in stool, nausea and vomiting  Endocrine: Negative for cold intolerance, heat intolerance, polydipsia, polyphagia and polyuria  Genitourinary: Negative for difficulty urinating  Musculoskeletal: Negative for arthralgias  Skin: Negative for color change and wound  Neurological: Negative for dizziness, syncope, speech difficulty and headaches  Hematological: Negative for adenopathy  Psychiatric/Behavioral: Negative for agitation and behavioral problems           Current Outpatient Medications   Medication Sig Dispense Refill    amLODIPine-benazepril (LOTREL 5-20) 5-20 MG per capsule TAKE 1 CAPSULE BY MOUTH  DAILY 90 capsule 3    Bioflavonoid Products (ZULEYKA-C) 500-200-60 MG TABS Take by mouth      carvedilol (COREG CR) 20 MG 24 hr capsule TAKE 1 CAPSULE BY MOUTH  DAILY 90 capsule 3    Cinnamon 500 MG TABS Take by mouth      clotrimazole-betamethasone (LOTRISONE) 1-0 05 % cream Apply topically 2 (two) times a day 30 g 0    fluticasone (FLONASE) 50 mcg/act nasal spray into each nostril      glucose blood (FREESTYLE LITE) test strip by In Vitro route      hydrocortisone 1 % cream Apply topically 4 (four) times a day as needed for irritation 120 g 0    Januvia 50 MG tablet TAKE 1 TABLET BY MOUTH  DAILY 90 tablet 0    Magnesium Oxide 140 MG CAPS Take by mouth      metFORMIN (GLUCOPHAGE-XR) 500 mg 24 hr tablet TAKE 1 TABLET BY MOUTH  DAILY WITH DINNER 90 tablet 0    Multiple Vitamins-Iron (DAILY MULTIPLE VITAMIN/IRON PO) Take by mouth      mupirocin (BACTROBAN) 2 % cream Apply topically 2 (two) times a day      Omega-3 Fatty Acids (FISH OIL) 1000 MG CPDR Take by mouth      potassium chloride (K-DUR,KLOR-CON) 20 mEq tablet Take 1 tablet (20 mEq total) by mouth daily 90 tablet 1    simvastatin (ZOCOR) 10 mg tablet TAKE 1 TABLET BY MOUTH  DAILY AT BEDTIME      Viibryd 20 MG tablet TAKE 1 TABLET BY MOUTH  DAILY 90 tablet 0    hydrochlorothiazide (HYDRODIURIL) 12 5 mg tablet Take 1 tablet (12 5 mg total) by mouth daily 90 tablet 1    olopatadine (PATANOL) 0 1 % ophthalmic solution Apply 1 drop to eye 2 (two) times a day       No current facility-administered medications for this visit  Objective:    /70   Pulse 64   Temp 98 5 °F (36 9 °C)   Resp 18   Ht 5' 11" (1 803 m)   Wt 102 kg (224 lb)   BMI 31 24 kg/m²        Physical Exam  Vitals signs and nursing note reviewed  Constitutional:       General: He is not in acute distress  Appearance: He is well-developed  He is not diaphoretic  HENT:      Head: Normocephalic and atraumatic  Right Ear: External ear normal       Left Ear: External ear normal       Nose: Nose normal       Mouth/Throat:      Pharynx: No oropharyngeal exudate  Eyes:      General: No scleral icterus  Right eye: No discharge  Left eye: No discharge  Pupils: Pupils are equal, round, and reactive to light  Neck:      Thyroid: No thyromegaly  Cardiovascular:      Rate and Rhythm: Normal rate  Heart sounds: Normal heart sounds  No murmur  Pulmonary:      Effort: Pulmonary effort is normal  No respiratory distress  Breath sounds: Normal breath sounds  No wheezing  Abdominal:      General: Bowel sounds are normal  There is no distension  Palpations: Abdomen is soft  There is no mass  Tenderness: There is no abdominal tenderness  There is no guarding or rebound  Musculoskeletal: Normal range of motion  Skin:     General: Skin is warm and dry        Findings: No erythema or rash  Neurological:      Mental Status: He is alert  Coordination: Coordination normal       Deep Tendon Reflexes: Reflexes normal    Psychiatric:         Behavior: Behavior normal               Recent Results (from the past 2016 hour(s))   Comprehensive metabolic panel    Collection Time: 08/25/20 12:00 AM   Result Value Ref Range    Glucose, Random 155 (H) 65 - 99 mg/dL    BUN 22 8 - 27 mg/dL    Creatinine 1 57 (H) 0 76 - 1 27 mg/dL    eGFR Non African American 40 (L) >59 mL/min/1 73    eGFR  46 (L) >59 mL/min/1 73    SL AMB BUN/CREATININE RATIO 14 10 - 24    Sodium 140 134 - 144 mmol/L    Potassium 4 2 3 5 - 5 2 mmol/L    Chloride 103 96 - 106 mmol/L    CO2 21 20 - 29 mmol/L    CALCIUM 9 5 8 6 - 10 2 mg/dL    Protein, Total 7 1 6 0 - 8 5 g/dL    Albumin 4 7 (H) 3 6 - 4 6 g/dL    Globulin, Total 2 4 1 5 - 4 5 g/dL    Albumin/Globulin Ratio 2 0 1 2 - 2 2    TOTAL BILIRUBIN 0 8 0 0 - 1 2 mg/dL    Alk Phos Isoenzymes 53 39 - 117 IU/L    AST 23 0 - 40 IU/L    ALT 24 0 - 44 IU/L   Lipid panel    Collection Time: 08/25/20 12:00 AM   Result Value Ref Range    Cholesterol, Total 163 100 - 199 mg/dL    Triglycerides 290 (H) 0 - 149 mg/dL    HDL 42 >39 mg/dL    LDL Calculated 63 0 - 99 mg/dL   Cardiovascular Report    Collection Time: 08/25/20 12:00 AM   Result Value Ref Range    Interpretation Note     PDF Image Not applicable    [facility] Kidney Stone Panel    Collection Time: 08/25/20 12:00 AM   Result Value Ref Range    Interpretation Note    Hemoglobin A1c (w/out EAG)    Collection Time: 08/25/20 12:00 AM   Result Value Ref Range    Hemoglobin A1C 7 3 (H) 4 8 - 5 6 %         Durwood Cast, DO  BMI Counseling: Body mass index is 31 24 kg/m²  The BMI is above normal  Nutrition recommendations include reducing portion sizes

## 2020-09-09 ENCOUNTER — TELEPHONE (OUTPATIENT)
Dept: ADMINISTRATIVE | Facility: OTHER | Age: 85
End: 2020-09-09

## 2020-09-14 DIAGNOSIS — I10 BENIGN ESSENTIAL HYPERTENSION: ICD-10-CM

## 2020-09-14 DIAGNOSIS — E11.42 DIABETIC POLYNEUROPATHY ASSOCIATED WITH TYPE 2 DIABETES MELLITUS (HCC): ICD-10-CM

## 2020-09-14 DIAGNOSIS — E78.2 MIXED HYPERLIPIDEMIA: ICD-10-CM

## 2020-09-14 RX ORDER — SIMVASTATIN 10 MG
TABLET ORAL
Qty: 90 TABLET | Refills: 2 | Status: SHIPPED | OUTPATIENT
Start: 2020-09-14 | End: 2021-04-16

## 2020-09-14 RX ORDER — HYDROCHLOROTHIAZIDE 12.5 MG/1
12.5 TABLET ORAL DAILY
Qty: 90 TABLET | Refills: 1 | Status: SHIPPED | OUTPATIENT
Start: 2020-09-14 | End: 2021-02-22

## 2020-09-14 RX ORDER — POTASSIUM CHLORIDE 20 MEQ/1
20 TABLET, EXTENDED RELEASE ORAL DAILY
Qty: 90 TABLET | Refills: 1 | Status: SHIPPED | OUTPATIENT
Start: 2020-09-14 | End: 2021-01-25

## 2020-09-18 DIAGNOSIS — I10 BENIGN ESSENTIAL HYPERTENSION: ICD-10-CM

## 2020-09-18 RX ORDER — POTASSIUM CHLORIDE 20 MEQ/1
20 TABLET, EXTENDED RELEASE ORAL DAILY
Qty: 90 TABLET | Refills: 1 | OUTPATIENT
Start: 2020-09-18 | End: 2021-03-17

## 2020-10-14 LAB
ALBUMIN SERPL-MCNC: 4.4 G/DL (ref 3.6–4.6)
ALBUMIN/GLOB SERPL: 2 {RATIO} (ref 1.2–2.2)
ALP SERPL-CCNC: 55 IU/L (ref 39–117)
ALT SERPL-CCNC: 23 IU/L (ref 0–44)
AST SERPL-CCNC: 20 IU/L (ref 0–40)
BILIRUB SERPL-MCNC: 0.6 MG/DL (ref 0–1.2)
BUN SERPL-MCNC: 17 MG/DL (ref 8–27)
BUN/CREAT SERPL: 13 (ref 10–24)
CALCIUM SERPL-MCNC: 9.1 MG/DL (ref 8.6–10.2)
CHLORIDE SERPL-SCNC: 105 MMOL/L (ref 96–106)
CO2 SERPL-SCNC: 22 MMOL/L (ref 20–29)
CREAT SERPL-MCNC: 1.28 MG/DL (ref 0.76–1.27)
GLOBULIN SER-MCNC: 2.2 G/DL (ref 1.5–4.5)
GLUCOSE SERPL-MCNC: 189 MG/DL (ref 65–99)
MICRODELETION SYND BLD/T FISH: NORMAL
POTASSIUM SERPL-SCNC: 3.8 MMOL/L (ref 3.5–5.2)
PROT SERPL-MCNC: 6.6 G/DL (ref 6–8.5)
SL AMB EGFR AFRICAN AMERICAN: 59 ML/MIN/1.73
SL AMB EGFR NON AFRICAN AMERICAN: 51 ML/MIN/1.73
SODIUM SERPL-SCNC: 142 MMOL/L (ref 134–144)

## 2020-10-22 DIAGNOSIS — E11.42 DIABETIC POLYNEUROPATHY ASSOCIATED WITH TYPE 2 DIABETES MELLITUS (HCC): ICD-10-CM

## 2020-10-23 RX ORDER — METFORMIN HYDROCHLORIDE 500 MG/1
TABLET, EXTENDED RELEASE ORAL
Qty: 90 TABLET | Refills: 3 | Status: SHIPPED | OUTPATIENT
Start: 2020-10-23 | End: 2021-09-16

## 2020-10-23 RX ORDER — SITAGLIPTIN 50 MG/1
TABLET, FILM COATED ORAL
Qty: 90 TABLET | Refills: 3 | Status: SHIPPED | OUTPATIENT
Start: 2020-10-23 | End: 2021-09-16

## 2020-11-09 ENCOUNTER — OFFICE VISIT (OUTPATIENT)
Dept: PODIATRY | Facility: CLINIC | Age: 85
End: 2020-11-09
Payer: COMMERCIAL

## 2020-11-09 VITALS
BODY MASS INDEX: 31.36 KG/M2 | HEIGHT: 71 IN | RESPIRATION RATE: 16 BRPM | WEIGHT: 224 LBS | DIASTOLIC BLOOD PRESSURE: 74 MMHG | SYSTOLIC BLOOD PRESSURE: 152 MMHG | HEART RATE: 74 BPM

## 2020-11-09 DIAGNOSIS — I70.209 PERIPHERAL ARTERIOSCLEROSIS (HCC): ICD-10-CM

## 2020-11-09 DIAGNOSIS — E11.42 DIABETIC POLYNEUROPATHY ASSOCIATED WITH TYPE 2 DIABETES MELLITUS (HCC): Primary | ICD-10-CM

## 2020-11-09 DIAGNOSIS — M79.672 PAIN IN BOTH FEET: ICD-10-CM

## 2020-11-09 DIAGNOSIS — M79.671 PAIN IN BOTH FEET: ICD-10-CM

## 2020-11-09 DIAGNOSIS — L84 CORNS: ICD-10-CM

## 2020-11-09 PROCEDURE — 11056 PARNG/CUTG B9 HYPRKR LES 2-4: CPT | Performed by: PODIATRIST

## 2020-12-13 DIAGNOSIS — I10 BENIGN ESSENTIAL HYPERTENSION: ICD-10-CM

## 2020-12-14 RX ORDER — AMLODIPINE BESYLATE AND BENAZEPRIL HYDROCHLORIDE 5; 20 MG/1; MG/1
1 CAPSULE ORAL DAILY
Qty: 90 CAPSULE | Refills: 3 | Status: SHIPPED | OUTPATIENT
Start: 2020-12-14 | End: 2021-05-11

## 2021-01-18 ENCOUNTER — OFFICE VISIT (OUTPATIENT)
Dept: PODIATRY | Facility: CLINIC | Age: 86
End: 2021-01-18
Payer: COMMERCIAL

## 2021-01-18 ENCOUNTER — TELEPHONE (OUTPATIENT)
Dept: FAMILY MEDICINE CLINIC | Facility: CLINIC | Age: 86
End: 2021-01-18

## 2021-01-18 VITALS
RESPIRATION RATE: 17 BRPM | HEIGHT: 71 IN | HEART RATE: 87 BPM | SYSTOLIC BLOOD PRESSURE: 137 MMHG | WEIGHT: 224 LBS | BODY MASS INDEX: 31.36 KG/M2 | DIASTOLIC BLOOD PRESSURE: 91 MMHG

## 2021-01-18 DIAGNOSIS — M79.671 PAIN IN BOTH FEET: ICD-10-CM

## 2021-01-18 DIAGNOSIS — E11.42 DIABETIC POLYNEUROPATHY ASSOCIATED WITH TYPE 2 DIABETES MELLITUS (HCC): Primary | ICD-10-CM

## 2021-01-18 DIAGNOSIS — M79.672 PAIN IN BOTH FEET: ICD-10-CM

## 2021-01-18 DIAGNOSIS — L84 CORNS: ICD-10-CM

## 2021-01-18 DIAGNOSIS — I70.209 PERIPHERAL ARTERIOSCLEROSIS (HCC): ICD-10-CM

## 2021-01-18 PROCEDURE — 11056 PARNG/CUTG B9 HYPRKR LES 2-4: CPT | Performed by: PODIATRIST

## 2021-01-18 NOTE — TELEPHONE ENCOUNTER
Nulytely Colonoscopy Prep Instructions    [facility] [place]   [address], [place] [address]  [phone]      PATIENT NAME:Cecilio Tian             Patient is due for appt  Please call to schedule AWV  Thank you   Vitaliy Zamora MA 10 minutes until the solution              is gone.              *If your procedure is scheduled for the early morning, you will need to get up in               the middle of the night to take this dose of preparation. The correct timing of this               dose is essential to an effective preparation. If you do not take this dose, your               exam may be incomplete and need to be repeated.         2.  Have nothing to eat or drink for 3 hours before the procedure.         3.  Take your morning medications, if any, with a small sip of water.         4.  Bring someone to drive you home (you should not drive for 12 hrs after the exam)        5.  Report to Admitting, 1st floor hospital entrance at 7am.        If you are diabetic, do not take insulin or oral medications the morning of the procedure. Take only a half dose of insulin the day before your procedure. Do not take your diabetic pills the day of your procedure               Colonoscopy is used to view the inside of your lower digestive tract (colon and rectum). It can help screen for colon cancer and can also help find the source of abdominal pain, bleeding, and changes in bowel habits. The test is usually done in the hospital on an outpatient basis. During the exam, the doctor can remove a small tissue sample ( a biopsy) for testing. Small growths, such as polyps, may also be removed during colonoscopy.     A camera attached to a flexible tube with a viewing lens is used to take video pictures.    Getting Ready    Be sure to tell your doctor about any medications you take. Also tell your doctor about any health conditions you may have.   Discuss the risks of the test with your doctor. These include bleeding and bowel puncture.   Your rectum and colon must be empty for the test. So be sure to follow the diet and bowel prep instructions exactly. If you dont, the test may need to be rescheduled.   You will need to have a friend or family member  prepared to drive you home after the test.     Colonoscopy provides an inside view of the entire colon.    During the Test    You are given sedating (relaxing) medication through an IV line. You may be drowsy or completely asleep.   The procedure takes 30 minutes or longer.   The doctor performs a digital rectal exam to check for anal and rectal problems. The rectum is lubricated and the scope inserted.   If you are awake, you may have a feeling similar to needing to have a bowel movement. You may also feel pressure as air is pumped into the colon. Its okay to pass gas during the procedure.  After the Test    You may discuss the results with your doctor right away or at a future visit.   Try to pass all the gas right after the test to help prevent bloating and cramping.   After the test, you can go back to your normal eating and other activities.  Risks and Possible Complications Include:   Bleeding  A puncture or tear in the colon  Risks of anesthesia

## 2021-01-22 DIAGNOSIS — Z23 ENCOUNTER FOR IMMUNIZATION: ICD-10-CM

## 2021-01-24 DIAGNOSIS — I10 BENIGN ESSENTIAL HYPERTENSION: ICD-10-CM

## 2021-01-25 RX ORDER — POTASSIUM CHLORIDE 20 MEQ/1
TABLET, EXTENDED RELEASE ORAL
Qty: 90 TABLET | Refills: 3 | Status: SHIPPED | OUTPATIENT
Start: 2021-01-25 | End: 2021-05-27

## 2021-01-29 DIAGNOSIS — B35.4 DERMATOPHYTOSIS OF BODY: ICD-10-CM

## 2021-01-29 RX ORDER — CLOTRIMAZOLE AND BETAMETHASONE DIPROPIONATE 10; .64 MG/G; MG/G
CREAM TOPICAL 2 TIMES DAILY
Qty: 30 G | Refills: 0 | Status: SHIPPED | OUTPATIENT
Start: 2021-01-29 | End: 2021-05-28

## 2021-02-08 ENCOUNTER — IMMUNIZATIONS (OUTPATIENT)
Dept: FAMILY MEDICINE CLINIC | Facility: HOSPITAL | Age: 86
End: 2021-02-08

## 2021-02-08 DIAGNOSIS — Z23 ENCOUNTER FOR IMMUNIZATION: Primary | ICD-10-CM

## 2021-02-08 PROCEDURE — 0002A SARS-COV-2 / COVID-19 MRNA VACCINE (PFIZER-BIONTECH) 30 MCG: CPT

## 2021-02-08 PROCEDURE — 91300 SARS-COV-2 / COVID-19 MRNA VACCINE (PFIZER-BIONTECH) 30 MCG: CPT

## 2021-02-09 DIAGNOSIS — F32.A MINOR DEPRESSION: ICD-10-CM

## 2021-02-10 RX ORDER — VILAZODONE HYDROCHLORIDE 20 MG/1
TABLET ORAL
Qty: 90 TABLET | Refills: 3 | Status: SHIPPED | OUTPATIENT
Start: 2021-02-10 | End: 2022-01-05

## 2021-02-20 DIAGNOSIS — I10 BENIGN ESSENTIAL HYPERTENSION: ICD-10-CM

## 2021-02-22 RX ORDER — HYDROCHLOROTHIAZIDE 12.5 MG/1
TABLET ORAL
Qty: 90 TABLET | Refills: 3 | Status: SHIPPED | OUTPATIENT
Start: 2021-02-22 | End: 2021-03-15 | Stop reason: SDUPTHER

## 2021-02-22 RX ORDER — CARVEDILOL PHOSPHATE 20 MG/1
CAPSULE, EXTENDED RELEASE ORAL
Qty: 90 CAPSULE | Refills: 3 | Status: SHIPPED | OUTPATIENT
Start: 2021-02-22 | End: 2022-01-14

## 2021-03-01 ENCOUNTER — IMMUNIZATIONS (OUTPATIENT)
Dept: FAMILY MEDICINE CLINIC | Facility: HOSPITAL | Age: 86
End: 2021-03-01

## 2021-03-01 DIAGNOSIS — Z23 ENCOUNTER FOR IMMUNIZATION: Primary | ICD-10-CM

## 2021-03-01 PROCEDURE — 0002A SARS-COV-2 / COVID-19 MRNA VACCINE (PFIZER-BIONTECH) 30 MCG: CPT

## 2021-03-01 PROCEDURE — 91300 SARS-COV-2 / COVID-19 MRNA VACCINE (PFIZER-BIONTECH) 30 MCG: CPT

## 2021-03-15 DIAGNOSIS — I10 BENIGN ESSENTIAL HYPERTENSION: ICD-10-CM

## 2021-03-15 NOTE — TELEPHONE ENCOUNTER
Patient not seen since 9/2020, needs appointment with Dr Taylor Murphy  Please call to schedule  Thank you    Funmilayo Montoya MA

## 2021-03-17 RX ORDER — HYDROCHLOROTHIAZIDE 12.5 MG/1
12.5 TABLET ORAL DAILY
Qty: 90 TABLET | Refills: 3 | Status: SHIPPED | OUTPATIENT
Start: 2021-03-17 | End: 2021-05-11

## 2021-03-17 NOTE — TELEPHONE ENCOUNTER
Requested medication(s) are due for refill today: Yes  Patient has already received a courtesy refill: No  Other reason request has been forwarded to provider: please see messages and advise

## 2021-03-19 DIAGNOSIS — Z20.822 EXPOSURE TO COVID-19 VIRUS: ICD-10-CM

## 2021-03-19 DIAGNOSIS — Z20.822 EXPOSURE TO COVID-19 VIRUS: Primary | ICD-10-CM

## 2021-03-19 PROCEDURE — U0003 INFECTIOUS AGENT DETECTION BY NUCLEIC ACID (DNA OR RNA); SEVERE ACUTE RESPIRATORY SYNDROME CORONAVIRUS 2 (SARS-COV-2) (CORONAVIRUS DISEASE [COVID-19]), AMPLIFIED PROBE TECHNIQUE, MAKING USE OF HIGH THROUGHPUT TECHNOLOGIES AS DESCRIBED BY CMS-2020-01-R: HCPCS | Performed by: FAMILY MEDICINE

## 2021-03-19 PROCEDURE — U0005 INFEC AGEN DETEC AMPLI PROBE: HCPCS | Performed by: FAMILY MEDICINE

## 2021-03-20 LAB — SARS-COV-2 RNA RESP QL NAA+PROBE: NEGATIVE

## 2021-03-22 ENCOUNTER — OFFICE VISIT (OUTPATIENT)
Dept: PODIATRY | Facility: CLINIC | Age: 86
End: 2021-03-22
Payer: COMMERCIAL

## 2021-03-22 VITALS
DIASTOLIC BLOOD PRESSURE: 91 MMHG | HEART RATE: 87 BPM | RESPIRATION RATE: 17 BRPM | SYSTOLIC BLOOD PRESSURE: 137 MMHG | WEIGHT: 224 LBS | HEIGHT: 71 IN | BODY MASS INDEX: 31.36 KG/M2

## 2021-03-22 DIAGNOSIS — M79.672 PAIN IN BOTH FEET: ICD-10-CM

## 2021-03-22 DIAGNOSIS — E11.42 DIABETIC POLYNEUROPATHY ASSOCIATED WITH TYPE 2 DIABETES MELLITUS (HCC): Primary | ICD-10-CM

## 2021-03-22 DIAGNOSIS — M79.671 PAIN IN BOTH FEET: ICD-10-CM

## 2021-03-22 DIAGNOSIS — L84 CORNS: ICD-10-CM

## 2021-03-22 DIAGNOSIS — I70.209 PERIPHERAL ARTERIOSCLEROSIS (HCC): ICD-10-CM

## 2021-03-22 PROCEDURE — 11056 PARNG/CUTG B9 HYPRKR LES 2-4: CPT | Performed by: PODIATRIST

## 2021-04-15 DIAGNOSIS — E78.2 MIXED HYPERLIPIDEMIA: ICD-10-CM

## 2021-04-15 DIAGNOSIS — E11.42 DIABETIC POLYNEUROPATHY ASSOCIATED WITH TYPE 2 DIABETES MELLITUS (HCC): ICD-10-CM

## 2021-04-16 RX ORDER — SIMVASTATIN 10 MG
TABLET ORAL
Qty: 90 TABLET | Refills: 1 | Status: SHIPPED | OUTPATIENT
Start: 2021-04-16 | End: 2021-07-07 | Stop reason: SDUPTHER

## 2021-04-29 DIAGNOSIS — J30.2 SEASONAL ALLERGIES: Primary | ICD-10-CM

## 2021-04-29 RX ORDER — MONTELUKAST SODIUM 10 MG/1
10 TABLET ORAL
Qty: 90 TABLET | Refills: 1 | Status: SHIPPED | OUTPATIENT
Start: 2021-04-29 | End: 2021-10-20

## 2021-04-29 RX ORDER — OLOPATADINE HYDROCHLORIDE 1 MG/ML
1 SOLUTION/ DROPS OPHTHALMIC 2 TIMES DAILY
Qty: 5 ML | Refills: 3 | Status: SHIPPED | OUTPATIENT
Start: 2021-04-29 | End: 2022-02-01

## 2021-04-29 RX ORDER — CETIRIZINE HYDROCHLORIDE 10 MG/1
10 TABLET ORAL EVERY MORNING
Qty: 90 TABLET | Refills: 1 | Status: SHIPPED | OUTPATIENT
Start: 2021-04-29 | End: 2021-10-20

## 2021-05-06 LAB
ALBUMIN SERPL-MCNC: 4.5 G/DL (ref 3.6–4.6)
ALBUMIN/CREAT UR: 18 MG/G CREAT (ref 0–29)
ALBUMIN/GLOB SERPL: 2 {RATIO} (ref 1.2–2.2)
ALP SERPL-CCNC: 54 IU/L (ref 39–117)
ALT SERPL-CCNC: 23 IU/L (ref 0–44)
AST SERPL-CCNC: 21 IU/L (ref 0–40)
BASOPHILS # BLD AUTO: 0.1 X10E3/UL (ref 0–0.2)
BASOPHILS NFR BLD AUTO: 1 %
BILIRUB SERPL-MCNC: 0.6 MG/DL (ref 0–1.2)
BUN SERPL-MCNC: 16 MG/DL (ref 8–27)
BUN/CREAT SERPL: 11 (ref 10–24)
CALCIUM SERPL-MCNC: 9.3 MG/DL (ref 8.6–10.2)
CHLORIDE SERPL-SCNC: 104 MMOL/L (ref 96–106)
CHOLEST SERPL-MCNC: 164 MG/DL (ref 100–199)
CO2 SERPL-SCNC: 24 MMOL/L (ref 20–29)
CREAT SERPL-MCNC: 1.47 MG/DL (ref 0.76–1.27)
CREAT UR-MCNC: 166.1 MG/DL
EOSINOPHIL # BLD AUTO: 0.2 X10E3/UL (ref 0–0.4)
EOSINOPHIL NFR BLD AUTO: 4 %
ERYTHROCYTE [DISTWIDTH] IN BLOOD BY AUTOMATED COUNT: 12.7 % (ref 11.6–15.4)
GLOBULIN SER-MCNC: 2.3 G/DL (ref 1.5–4.5)
GLUCOSE SERPL-MCNC: 195 MG/DL (ref 65–99)
HBA1C MFR BLD: 8.4 % (ref 4.8–5.6)
HCT VFR BLD AUTO: 42.6 % (ref 37.5–51)
HDLC SERPL-MCNC: 41 MG/DL
HGB BLD-MCNC: 14.7 G/DL (ref 13–17.7)
IMM GRANULOCYTES # BLD: 0 X10E3/UL (ref 0–0.1)
IMM GRANULOCYTES NFR BLD: 0 %
LDLC SERPL CALC-MCNC: 76 MG/DL (ref 0–99)
LYMPHOCYTES # BLD AUTO: 1.7 X10E3/UL (ref 0.7–3.1)
LYMPHOCYTES NFR BLD AUTO: 28 %
MCH RBC QN AUTO: 30.7 PG (ref 26.6–33)
MCHC RBC AUTO-ENTMCNC: 34.5 G/DL (ref 31.5–35.7)
MCV RBC AUTO: 89 FL (ref 79–97)
MICROALBUMIN UR-MCNC: 30.3 UG/ML
MICRODELETION SYND BLD/T FISH: NORMAL
MICRODELETION SYND BLD/T FISH: NORMAL
MONOCYTES # BLD AUTO: 0.6 X10E3/UL (ref 0.1–0.9)
MONOCYTES NFR BLD AUTO: 10 %
NEUTROPHILS # BLD AUTO: 3.4 X10E3/UL (ref 1.4–7)
NEUTROPHILS NFR BLD AUTO: 57 %
PLATELET # BLD AUTO: 172 X10E3/UL (ref 150–450)
POTASSIUM SERPL-SCNC: 4 MMOL/L (ref 3.5–5.2)
PROT SERPL-MCNC: 6.8 G/DL (ref 6–8.5)
RBC # BLD AUTO: 4.79 X10E6/UL (ref 4.14–5.8)
SL AMB EGFR AFRICAN AMERICAN: 50 ML/MIN/1.73
SL AMB EGFR NON AFRICAN AMERICAN: 43 ML/MIN/1.73
SODIUM SERPL-SCNC: 143 MMOL/L (ref 134–144)
TRIGL SERPL-MCNC: 292 MG/DL (ref 0–149)
WBC # BLD AUTO: 6 X10E3/UL (ref 3.4–10.8)

## 2021-05-10 ENCOUNTER — TELEPHONE (OUTPATIENT)
Dept: FAMILY MEDICINE CLINIC | Facility: CLINIC | Age: 86
End: 2021-05-10

## 2021-05-11 ENCOUNTER — OFFICE VISIT (OUTPATIENT)
Dept: FAMILY MEDICINE CLINIC | Facility: CLINIC | Age: 86
End: 2021-05-11
Payer: COMMERCIAL

## 2021-05-11 VITALS
WEIGHT: 219 LBS | RESPIRATION RATE: 20 BRPM | HEART RATE: 64 BPM | SYSTOLIC BLOOD PRESSURE: 136 MMHG | TEMPERATURE: 97.4 F | OXYGEN SATURATION: 98 % | DIASTOLIC BLOOD PRESSURE: 82 MMHG | BODY MASS INDEX: 30.66 KG/M2 | HEIGHT: 71 IN

## 2021-05-11 DIAGNOSIS — Z12.5 SCREENING FOR PROSTATE CANCER: ICD-10-CM

## 2021-05-11 DIAGNOSIS — I10 BENIGN ESSENTIAL HYPERTENSION: ICD-10-CM

## 2021-05-11 DIAGNOSIS — E11.42 DIABETIC POLYNEUROPATHY ASSOCIATED WITH TYPE 2 DIABETES MELLITUS (HCC): Primary | ICD-10-CM

## 2021-05-11 DIAGNOSIS — Z00.00 MEDICARE ANNUAL WELLNESS VISIT, SUBSEQUENT: ICD-10-CM

## 2021-05-11 DIAGNOSIS — N18.31 CHRONIC KIDNEY DISEASE (CKD) STAGE G3A/A1, MODERATELY DECREASED GLOMERULAR FILTRATION RATE (GFR) BETWEEN 45-59 ML/MIN/1.73 SQUARE METER AND ALBUMINURIA CREATININE RATIO LESS THAN 30 MG/G (HCC): ICD-10-CM

## 2021-05-11 DIAGNOSIS — E78.2 MIXED HYPERLIPIDEMIA: ICD-10-CM

## 2021-05-11 DIAGNOSIS — E66.9 OBESITY (BMI 30.0-34.9): ICD-10-CM

## 2021-05-11 PROCEDURE — 1101F PT FALLS ASSESS-DOCD LE1/YR: CPT | Performed by: FAMILY MEDICINE

## 2021-05-11 PROCEDURE — 1170F FXNL STATUS ASSESSED: CPT | Performed by: FAMILY MEDICINE

## 2021-05-11 PROCEDURE — 99214 OFFICE O/P EST MOD 30 MIN: CPT | Performed by: FAMILY MEDICINE

## 2021-05-11 PROCEDURE — G0439 PPPS, SUBSEQ VISIT: HCPCS | Performed by: FAMILY MEDICINE

## 2021-05-11 PROCEDURE — 1125F AMNT PAIN NOTED PAIN PRSNT: CPT | Performed by: FAMILY MEDICINE

## 2021-05-11 PROCEDURE — 3725F SCREEN DEPRESSION PERFORMED: CPT | Performed by: FAMILY MEDICINE

## 2021-05-11 PROCEDURE — 3288F FALL RISK ASSESSMENT DOCD: CPT | Performed by: FAMILY MEDICINE

## 2021-05-11 RX ORDER — GLIPIZIDE 2.5 MG/1
2.5 TABLET, EXTENDED RELEASE ORAL DAILY
Qty: 90 TABLET | Refills: 1 | Status: SHIPPED | OUTPATIENT
Start: 2021-05-11 | End: 2021-09-13

## 2021-05-11 RX ORDER — AMLODIPINE BESYLATE AND BENAZEPRIL HYDROCHLORIDE 10; 20 MG/1; MG/1
1 CAPSULE ORAL DAILY
Qty: 90 CAPSULE | Refills: 1 | Status: SHIPPED | OUTPATIENT
Start: 2021-05-11 | End: 2021-09-13

## 2021-05-11 NOTE — PATIENT INSTRUCTIONS
Obesity   AMBULATORY CARE:   Obesity  is when your body mass index (BMI) is greater than 30  Your healthcare provider will use your height and weight to measure your BMI  The risks of obesity include  many health problems, such as injuries or physical disability  You may need tests to check for the following:  · Diabetes    · High blood pressure or high cholesterol    · Heart disease    · Gallbladder or liver disease    · Cancer of the colon, breast, prostate, liver, or kidney    · Sleep apnea    · Arthritis or gout    Seek care immediately if:   · You have a severe headache, confusion, or difficulty speaking  · You have weakness on one side of your body  · You have chest pain, sweating, or shortness of breath  Contact your healthcare provider if:   · You have symptoms of gallbladder or liver disease, such as pain in your upper abdomen  · You have knee or hip pain and discomfort while walking  · You have symptoms of diabetes, such as intense hunger and thirst, and frequent urination  · You have symptoms of sleep apnea, such as snoring or daytime sleepiness  · You have questions or concerns about your condition or care  Treatment for obesity  focuses on helping you lose weight to improve your health  Even a small decrease in BMI can reduce the risk for many health problems  Your healthcare provider will help you set a weight-loss goal   · Lifestyle changes  are the first step in treating obesity  These include making healthy food choices and getting regular physical activity  Your healthcare provider may suggest a weight-loss program that involves coaching, education, and therapy  · Medicine  may help you lose weight when it is used with a healthy diet and physical activity  · Surgery  can help you lose weight if you are very obese and have other health problems  There are several types of weight-loss surgery  Ask your healthcare provider for more information      Be successful losing weight:   · Set small, realistic goals  An example of a small goal is to walk for 20 minutes 5 days a week  Anther goal is to lose 5% of your body weight  · Tell friends, family members, and coworkers about your goals  and ask for their support  Ask a friend to lose weight with you, or join a weight-loss support group  · Identify foods or triggers that may cause you to overeat , and find ways to avoid them  Remove tempting high-calorie foods from your home and workplace  Place a bowl of fresh fruit on your kitchen counter  If stress causes you to eat, then find other ways to cope with stress  · Keep a diary to track what you eat and drink  Also write down how many minutes of physical activity you do each day  Weigh yourself once a week and record it in your diary  Eating changes: You will need to eat 500 to 1,000 fewer calories each day than you currently eat to lose 1 to 2 pounds a week  The following changes will help you cut calories:  · Eat smaller portions  Use small plates, no larger than 9 inches in diameter  Fill your plate half full of fruits and vegetables  Measure your food using measuring cups until you know what a serving size looks like  · Eat 3 meals and 1 or 2 snacks each day  Plan your meals in advance  [employer] [employer] and eat at home most of the time  Eat slowly  Do not skip meals  Skipping meals can lead to overeating later in the day  This can make it harder for you to lose weight  Talk with a dietitian to help you make a meal plan and schedule that is right for you  · Eat fruits and vegetables at every meal   They are low in calories and high in fiber, which makes you feel full  Do not add butter, margarine, or cream sauce to vegetables  Use herbs to season steamed vegetables  · Eat less fat and fewer fried foods  Eat more baked or grilled chicken and fish  These protein sources are lower in calories and fat than red meat  Limit fast food   Dress your salads with olive oil and vinegar instead of bottled dressing  · Limit the amount of sugar you eat  Do not drink sugary beverages  Limit alcohol  Activity changes:  Physical activity is good for your body in many ways  It helps you burn calories and build strong muscles  It decreases stress and depression, and improves your mood  It can also help you sleep better  Talk to your healthcare provider before you begin an exercise program   · Exercise for at least 30 minutes 5 days a week  Start slowly  Set aside time each day for physical activity that you enjoy and that is convenient for you  It is best to do both weight training and an activity that increases your heart rate, such as walking, bicycling, or swimming  · Find ways to be more active  Do yard work and housecleaning  Walk up the stairs instead of using elevators  Spend your leisure time going to events that require walking, such as outdoor festivals or fairs  This extra physical activity can help you lose weight and keep it off  Follow up with your healthcare provider as directed: You may need to meet with a dietitian  Write down your questions so you remember to ask them during your visits  © Copyright [address] [place] [address] Drive Information is for End User's use only and may not be sold, redistributed or otherwise used for commercial purposes  All illustrations and images included in CareNotes® are the copyrighted property of A D A M , Inc  or [facility] Jesse johnie   The above information is an  only  It is not intended as medical advice for individual conditions or treatments  Talk to your doctor, nurse or pharmacist before following any medical regimen to see if it is safe and effective for you  Medicare Preventive Visit Patient Instructions  Thank you for completing your Welcome to Medicare Visit or Medicare Annual Wellness Visit today  Your next wellness visit will be due in one year (5/12/2022)    The screening/preventive services that you may require over the next 5-10 years are detailed below  Some tests may not apply to you based off risk factors and/or age  Screening tests ordered at today's visit but not completed yet may show as past due  Also, please note that scanned in results may not display below  Preventive Screenings:  Service Recommendations Previous Testing/Comments   Colorectal Cancer Screening  · Colonoscopy    · Fecal Occult Blood Test (FOBT)/Fecal Immunochemical Test (FIT)  · Fecal DNA/Cologuard Test  · Flexible Sigmoidoscopy Age: 54-65 years old   Colonoscopy: every 10 years (May be performed more frequently if at higher risk)  OR  FOBT/FIT: every 1 year  OR  Cologuard: every 3 years  OR  Sigmoidoscopy: every 5 years  Screening may be recommended earlier than age 48 if at higher risk for colorectal cancer  Also, an individualized decision between you and your healthcare provider will decide whether screening between the ages of 74-80 would be appropriate  Colonoscopy: Not on file  FOBT/FIT: Not on file  Cologuard: Not on file  Sigmoidoscopy: Not on file    Screening Not Indicated     Prostate Cancer Screening Individualized decision between patient and health care provider in men between ages of 53-78   Medicare will cover every 12 months beginning on the day after your 50th birthday PSA: 1 7 ng/mL     Screening Not Indicated     Hepatitis C Screening Once for adults born between 1945 and 1965  More frequently in patients at high risk for Hepatitis C Hep C Antibody: Not on file        Diabetes Screening 1-2 times per year if you're at risk for diabetes or have pre-diabetes Fasting glucose: No results in last 5 years   A1C: 8 4 %    Screening Not Indicated  History Diabetes   Cholesterol Screening Once every 5 years if you don't have a lipid disorder  May order more often based on risk factors  Lipid panel: 05/05/2021    Screening Not Indicated  History Lipid Disorder      Other Preventive Screenings Covered by Medicare:  1   Abdominal Aortic Aneurysm (AAA) Screening: covered once if your at risk  You're considered to be at risk if you have a family history of AAA or a male between the age of 73-68 who smoking at least 100 cigarettes in your lifetime  2  Lung Cancer Screening: covers low dose CT scan once per year if you meet all of the following conditions: (1) Age 50-69; (2) No signs or symptoms of lung cancer; (3) Current smoker or have quit smoking within the last 15 years; (4) You have a tobacco smoking history of at least 30 pack years (packs per day x number of years you smoked); (5) You get a written order from a healthcare provider  3  Glaucoma Screening: covered annually if you're considered high risk: (1) You have diabetes OR (2) Family history of glaucoma OR (3)  aged 48 and older OR (3)  American aged 72 and older  3  Osteoporosis Screening: covered every 2 years if you meet one of the following conditions: (1) Have a vertebral abnormality; (2) On glucocorticoid therapy for more than 3 months; (3) Have primary hyperparathyroidism; (4) On osteoporosis medications and need to assess response to drug therapy  5  HIV Screening: covered annually if you're between the age of 12-76  Also covered annually if you are younger than 13 and older than 72 with risk factors for HIV infection  For pregnant patients, it is covered up to 3 times per pregnancy  Immunizations:  Immunization Recommendations   Influenza Vaccine Annual influenza vaccination during flu season is recommended for all persons aged >= 6 months who do not have contraindications   Pneumococcal Vaccine (Prevnar and Pneumovax)  * Prevnar = PCV13  * Pneumovax = PPSV23 Adults 25-60 years old: 1-3 doses may be recommended based on certain risk factors  Adults 72 years old: Prevnar (PCV13) vaccine recommended followed by Pneumovax (PPSV23) vaccine  If already received PPSV23 since turning 65, then PCV13 recommended at least one year after PPSV23 dose  Hepatitis B Vaccine 3 dose series if at intermediate or high risk (ex: diabetes, end stage renal disease, liver disease)   Tetanus (Td) Vaccine - COST NOT COVERED BY MEDICARE PART B Following completion of primary series, a booster dose should be given every 10 years to maintain immunity against tetanus  Td may also be given as tetanus wound prophylaxis  Tdap Vaccine - COST NOT COVERED BY MEDICARE PART B Recommended at least once for all adults  For pregnant patients, recommended with each pregnancy  Shingles Vaccine (Shingrix) - COST NOT COVERED BY MEDICARE PART B  2 shot series recommended in those aged 48 and above     Health Maintenance Due:  There are no preventive care reminders to display for this patient  Immunizations Due:      Topic Date Due    DTaP,Tdap,and Td Vaccines (2 - Td) 01/11/2021     Advance Directives   What are advance directives? Advance directives are legal documents that state your wishes and plans for medical care  These plans are made ahead of time in case you lose your ability to make decisions for yourself  Advance directives can apply to any medical decision, such as the treatments you want, and if you want to donate organs  What are the types of advance directives? There are many types of advance directives, and each state has rules about how to use them  You may choose a combination of any of the following:  · Living will: This is a written record of the treatment you want  You can also choose which treatments you do not want, which to limit, and which to stop at a certain time  This includes surgery, medicine, IV fluid, and tube feedings  · Durable power of  for healthcare [facility]): This is a written record that states who you want to make healthcare choices for you when you are unable to make them for yourself  This person, called a proxy, is usually a family member or a friend  You may choose more than 1 proxy    · Do not resuscitate (DNR) order:  A DNR order is used in case your heart stops beating or you stop breathing  It is a request not to have certain forms of treatment, such as CPR  A DNR order may be included in other types of advance directives  · Medical directive: This covers the care that you want if you are in a coma, near death, or unable to make decisions for yourself  You can list the treatments you want for each condition  Treatment may include pain medicine, surgery, blood transfusions, dialysis, IV or tube feedings, and a ventilator (breathing machine)  · Values history: This document has questions about your views, beliefs, and how you feel and think about life  This information can help others choose the care that you would choose  Why are advance directives important? An advance directive helps you control your care  Although spoken wishes may be used, it is better to have your wishes written down  Spoken wishes can be misunderstood, or not followed  Treatments may be given even if you do not want them  An advance directive may make it easier for your family to make difficult choices about your care  Weight Management   Why it is important to manage your weight:  Being overweight increases your risk of health conditions such as heart disease, high blood pressure, type 2 diabetes, and certain types of cancer  It can also increase your risk for osteoarthritis, sleep apnea, and other respiratory problems  Aim for a slow, steady weight loss  Even a small amount of weight loss can lower your risk of health problems  How to lose weight safely:  A safe and healthy way to lose weight is to eat fewer calories and get regular exercise  You can lose up about 1 pound a week by decreasing the number of calories you eat by 500 calories each day  Healthy meal plan for weight management:  A healthy meal plan includes a variety of foods, contains fewer calories, and helps you stay healthy   A healthy meal plan includes the following:  · Eat whole-grain foods more often  A healthy meal plan should contain fiber  Fiber is the part of grains, fruits, and vegetables that is not broken down by your body  Whole-grain foods are healthy and provide extra fiber in your diet  Some examples of whole-grain foods are whole-wheat breads and pastas, oatmeal, brown rice, and bulgur  · Eat a variety of vegetables every day  Include dark, leafy greens such as spinach, kale, regan greens, and mustard greens  Eat yellow and orange vegetables such as carrots, sweet potatoes, and winter squash  · Eat a variety of fruits every day  Choose fresh or canned fruit (canned in its own juice or light syrup) instead of juice  Fruit juice has very little or no fiber  · Eat low-fat dairy foods  Drink fat-free (skim) milk or 1% milk  Eat fat-free yogurt and low-fat cottage cheese  Try low-fat cheeses such as mozzarella and other reduced-fat cheeses  · Choose meat and other protein foods that are low in fat  Choose beans or other legumes such as split peas or lentils  Choose fish, skinless poultry (chicken or turkey), or lean cuts of red meat (beef or pork)  Before you cook meat or poultry, cut off any visible fat  · Use less fat and oil  Try baking foods instead of frying them  Add less fat, such as margarine, sour cream, regular salad dressing and mayonnaise to foods  Eat fewer high-fat foods  Some examples of high-fat foods include french fries, doughnuts, ice cream, and cakes  · Eat fewer sweets  Limit foods and drinks that are high in sugar  This includes candy, cookies, regular soda, and sweetened drinks  Exercise:  Exercise at least 30 minutes per day on most days of the week  Some examples of exercise include walking, biking, dancing, and swimming  You can also fit in more physical activity by taking the stairs instead of the elevator or parking farther away from stores  Ask your healthcare provider about the best exercise plan for you        © Copyright [employer] 2018 Information is for End User's use only and may not be sold, redistributed or otherwise used for commercial purposes   All illustrations and images included in CareNotes® are the copyrighted property of A D A M , Inc  or [facility] Jesse Clayton

## 2021-05-11 NOTE — PROGRESS NOTES
Assessment and Plan:     Problem List Items Addressed This Visit        Endocrine    Diabetic polyneuropathy associated with type 2 diabetes mellitus ([facility] ) - Primary    Relevant Medications    glipiZIDE (GLUCOTROL XL) 2 5 mg 24 hr tablet    Other Relevant Orders    Hemoglobin A1C    Hemoglobin A1C       Cardiovascular and Mediastinum    Benign essential hypertension     stable         Relevant Medications    amLODIPine-benazepril (LOTREL) 10-20 MG per capsule    Other Relevant Orders    Comprehensive metabolic panel    Comprehensive metabolic panel       Genitourinary    Chronic kidney disease (CKD) stage G3a/A1, moderately decreased glomerular filtration rate (GFR) between 45-59 mL/min/1 73 square meter and albuminuria creatinine ratio less than 30 mg/g (HCC)       Other    Mixed hyperlipidemia      Other Visit Diagnoses     Obesity (BMI 30 0-34  9)        BMI 30 0-30 9,adult        Medicare annual wellness visit, subsequent        Screening for prostate cancer        Relevant Orders    PSA, Total Screen           Preventive health issues were discussed with patient, and age appropriate screening tests were ordered as noted in patient's After Visit Summary  Personalized health advice and appropriate referrals for health education or preventive services given if needed, as noted in patient's After Visit Summary       History of Present Illness:     Patient presents for Medicare Annual Wellness visit    Patient Care Team:  Danny Davies DO as PCP - General  DO Thony Duran DPM     Problem List:     Patient Active Problem List   Diagnosis    Corns    Pain in both feet    Diabetic polyneuropathy associated with type 2 diabetes mellitus ([facility] )    Mixed hyperlipidemia    Benign essential hypertension    Minor depression    Acquired deformity of foot    Metatarsalgia of both feet    Basal cell carcinoma of skin    Chronic kidney disease (CKD) stage G3a/A1, moderately decreased glomerular filtration rate (GFR) between 45-59 mL/min/1 73 square meter and albuminuria creatinine ratio less than 30 mg/g (HCC)    Seasonal allergies      Past Medical and Surgical History:     Past Medical History:   Diagnosis Date    Ascending cholangitis     Last assessed 1/22/2015     Atherosclerosis of arteries of extremities ([address] )     Resolved 12/19/2016      Past Surgical History:   Procedure Laterality Date    APPENDECTOMY      Last assessed 1/22/2015     CHOLECYSTECTOMY      Last assessed 1/22/2015     FRACTURE SURGERY      Open treatment of fracture of the radial shaft   Last assessed 1/22/2015     TONSILLECTOMY      Last assessed 1/22/2015       Family History:     Family History   Problem Relation Age of Onset    Hypertension Father     Other Father         Gastric ulcer     Heart disease Mother     Hypertension Mother     Liver cancer Mother     Melanoma Mother     Diabetes Brother     Diabetes Family     Diabetes Maternal Grandfather     Mental illness Neg Hx       Social History:     E-Cigarette/Vaping    E-Cigarette Use Never User      E-Cigarette/Vaping Substances    Nicotine No     THC No     CBD No     Flavoring No     Other No     Unknown No      Social History     Socioeconomic History    Marital status: /Civil Union     Spouse name: None    Number of children: None    Years of education: None    Highest education level: None   Occupational History    None   Social Needs    Financial resource strain: None    Food insecurity     Worry: None     Inability: None    Transportation needs     Medical: None     Non-medical: None   Tobacco Use    Smoking status: Never Smoker    Smokeless tobacco: Never Used   Substance and Sexual Activity    Alcohol use: Yes     Frequency: 2-3 times a week     Drinks per session: 1 or 2    Drug use: Never    Sexual activity: None   Lifestyle    Physical activity     Days per week: None     Minutes per session: None    Stress: None Relationships    Social connections     Talks on phone: None     Gets together: None     Attends Orthodox service: None     Active member of club or organization: None     Attends meetings of clubs or organizations: None     Relationship status: None    Intimate partner violence     Fear of current or ex partner: None     Emotionally abused: None     Physically abused: None     Forced sexual activity: None   Other Topics Concern    None   Social History Narrative    None      Medications and Allergies:     Current Outpatient Medications   Medication Sig Dispense Refill    Bioflavonoid Products (ZULEYKA-C) 500-200-60 MG TABS Take by mouth      carvedilol (COREG CR) 20 MG 24 hr capsule TAKE 1 CAPSULE BY MOUTH  DAILY 90 capsule 3    cetirizine (ZyrTEC) 10 mg tablet Take 1 tablet (10 mg total) by mouth every morning 90 tablet 1    Cinnamon 500 MG TABS Take by mouth      clotrimazole-betamethasone (LOTRISONE) 1-0 05 % cream Apply topically 2 (two) times a day 30 g 0    fluticasone (FLONASE) 50 mcg/act nasal spray into each nostril      glucose blood (FREESTYLE LITE) test strip by In Vitro route      hydrocortisone 1 % cream Apply topically 4 (four) times a day as needed for irritation 120 g 0    Januvia 50 MG tablet TAKE 1 TABLET BY MOUTH  DAILY 90 tablet 3    metFORMIN (GLUCOPHAGE-XR) 500 mg 24 hr tablet TAKE 1 TABLET BY MOUTH  DAILY WITH DINNER 90 tablet 3    montelukast (SINGULAIR) 10 mg tablet Take 1 tablet (10 mg total) by mouth daily at bedtime 90 tablet 1    Multiple Vitamins-Iron (DAILY MULTIPLE VITAMIN/IRON PO) Take by mouth      olopatadine (PATANOL) 0 1 % ophthalmic solution Administer 1 drop to both eyes 2 (two) times a day 5 mL 3    Omega-3 Fatty Acids (FISH OIL) 1000 MG CPDR Take by mouth      potassium chloride (K-DUR,KLOR-CON) 20 mEq tablet TAKE 1 TABLET BY MOUTH  DAILY 90 tablet 3    simvastatin (ZOCOR) 10 mg tablet TAKE 1 TABLET BY MOUTH  DAILY AT BEDTIME 90 tablet 1    Viibryd 20 MG tablet TAKE 1 TABLET BY MOUTH  DAILY 90 tablet 3    amLODIPine-benazepril (LOTREL) 10-20 MG per capsule Take 1 capsule by mouth daily 90 capsule 1    glipiZIDE (GLUCOTROL XL) 2 5 mg 24 hr tablet Take 1 tablet (2 5 mg total) by mouth daily 90 tablet 1     No current facility-administered medications for this visit  Allergies   Allergen Reactions    Pollen Extract Allergic Rhinitis      Immunizations:     Immunization History   Administered Date(s) Administered    INFLUENZA 09/14/2009, 09/19/2011    Influenza Quadrivalent Preservative Free 3 years and older IM 09/10/2012, 10/08/2013, 09/16/2014    Influenza Split High Dose Preservative Free IM 10/27/2015, 11/02/2016, 11/30/2017, 10/18/2018    Influenza, high dose seasonal 0 7 mL 10/11/2019, 09/08/2020    Pneumococcal Conjugate 13-Valent 10/27/2015    Pneumococcal Polysaccharide PPV23 11/14/2007    SARS-CoV-2 / COVID-19 mRNA IM ([place]) 02/08/2021, 03/01/2021    Td (adult), adsorbed 05/04/2007    Tdap 01/11/2011      Health Maintenance: There are no preventive care reminders to display for this patient  Topic Date Due    DTaP,Tdap,and Td Vaccines (2 - Td) 01/11/2021      Medicare Health Risk Assessment:     /82   Pulse 64   Temp (!) 97 4 °F (36 3 °C)   Resp 20   Ht 5' 11" (1 803 m)   Wt 99 3 kg (219 lb)   SpO2 98%   BMI 30 54 kg/m²      Silvina Rankin is here for his Subsequent Wellness visit  Last Medicare Wellness visit information reviewed, patient interviewed, no change since last AWV  Health Risk Assessment:   Patient rates overall health as good  Patient feels that their physical health rating is slightly worse  Patient is satisfied with their life  Eyesight was rated as slightly worse  Hearing was rated as same  Patient feels that their emotional and mental health rating is same  Patients states they are never, rarely angry  Patient states they are sometimes unusually tired/fatigued   Pain experienced in the last 7 days has been some  Patient's pain rating has been 4/10  Patient states that he has experienced no weight loss or gain in last 6 months  Depression Screening:   PHQ-2 Score: 0  PHQ-9 Score: 2      Fall Risk Screening: In the past year, patient has experienced: no history of falling in past year      Home Safety:  Patient does not have trouble with stairs inside or outside of their home  Patient has working smoke alarms and has working carbon monoxide detector  Home safety hazards include: none  Nutrition:   Current diet is Regular and Diabetic  Medications:   Patient is not currently taking any over-the-counter supplements  Patient is able to manage medications  Activities of Daily Living (ADLs)/Instrumental Activities of Daily Living (IADLs):   Walk and transfer into and out of bed and chair?: Yes  Dress and groom yourself?: Yes    Bathe or shower yourself?: Yes    Feed yourself? Yes  Do your laundry/housekeeping?: Yes  Manage your money, pay your bills and track your expenses?: Yes  Make your own meals?: Yes    Do your own shopping?: Yes    ADL comments: Shares responsibilities with wife    Previous Hospitalizations:   Any hospitalizations or ED visits within the last 12 months?: No      Advance Care Planning:   Living will: Yes    Durable POA for healthcare:  Yes    Advanced directive: Yes      Cognitive Screening:   Provider or family/friend/caregiver concerned regarding cognition?: No    PREVENTIVE SCREENINGS      Cardiovascular Screening:    General: Screening Not Indicated, History Lipid Disorder and Risks and Benefits Discussed    Due for: Lipid Panel      Diabetes Screening:     General: Screening Not Indicated, History Diabetes and Risks and Benefits Discussed    Due for: Blood Glucose      Colorectal Cancer Screening:     General: Screening Not Indicated, Risks and Benefits Discussed and Patient Declines      Prostate Cancer Screening:    General: Screening Not Indicated and Risks and Benefits Discussed    Due for: PSA      Osteoporosis Screening:    General: Risks and Benefits Discussed      Abdominal Aortic Aneurysm (AAA) Screening:        General: Screening Not Indicated      Lung Cancer Screening:     General: Screening Not Indicated      Hepatitis C Screening:    General: Risks and Benefits Discussed and Patient Declines    Screening, Brief Intervention, and Referral to Treatment (SBIRT)    Screening  Typical number of drinks in a day: 0  Typical number of drinks in a week: 3  Interpretation: Low risk drinking behavior  Single Item Drug Screening:  How often have you used an illegal drug (including marijuana) or a prescription medication for non-medical reasons in the past year? never    Single Item Drug Screen Score: 0  Interpretation: Negative screen for possible drug use disorder    Brief Intervention  Alcohol & drug use screenings were reviewed  No concerns regarding substance use disorder identified         Nehal Solomon, DO

## 2021-05-11 NOTE — PROGRESS NOTES
Assessment/Plan:    1  Diabetic polyneuropathy associated with type 2 diabetes mellitus (HCC)  -     glipiZIDE (GLUCOTROL XL) 2 5 mg 24 hr tablet; Take 1 tablet (2 5 mg total) by mouth daily  -     Hemoglobin A1C; Future; Expected date: 06/11/2021  -     Hemoglobin A1C; Future; Expected date: 07/25/2021    2  Benign essential hypertension  Assessment & Plan:  stable    Orders:  -     amLODIPine-benazepril (LOTREL) 10-20 MG per capsule; Take 1 capsule by mouth daily  -     Comprehensive metabolic panel; Future; Expected date: 06/11/2021  -     Comprehensive metabolic panel; Future; Expected date: 07/25/2021    3  Chronic kidney disease (CKD) stage G3a/A1, moderately decreased glomerular filtration rate (GFR) between 45-59 mL/min/1 73 square meter and albuminuria creatinine ratio less than 30 mg/g (HCC)    4  Mixed hyperlipidemia    5  Obesity (BMI 30 0-34 9)    6  BMI 30 0-30 9,adult      going to increase the lotrel and stop the diuretic for now  Adding glipizide to diabetic meds  Will foillow a1c in one and three months  Will follow cmp in one and three months    Patient Instructions       Obesity   AMBULATORY CARE:   Obesity  is when your body mass index (BMI) is greater than 30  Your healthcare provider will use your height and weight to measure your BMI  The risks of obesity include  many health problems, such as injuries or physical disability  You may need tests to check for the following:  · Diabetes    · High blood pressure or high cholesterol    · Heart disease    · Gallbladder or liver disease    · Cancer of the colon, breast, prostate, liver, or kidney    · Sleep apnea    · Arthritis or gout    Seek care immediately if:   · You have a severe headache, confusion, or difficulty speaking  · You have weakness on one side of your body  · You have chest pain, sweating, or shortness of breath      Contact your healthcare provider if:   · You have symptoms of gallbladder or liver disease, such as pain in your upper abdomen  · You have knee or hip pain and discomfort while walking  · You have symptoms of diabetes, such as intense hunger and thirst, and frequent urination  · You have symptoms of sleep apnea, such as snoring or daytime sleepiness  · You have questions or concerns about your condition or care  Treatment for obesity  focuses on helping you lose weight to improve your health  Even a small decrease in BMI can reduce the risk for many health problems  Your healthcare provider will help you set a weight-loss goal   · Lifestyle changes  are the first step in treating obesity  These include making healthy food choices and getting regular physical activity  Your healthcare provider may suggest a weight-loss program that involves coaching, education, and therapy  · Medicine  may help you lose weight when it is used with a healthy diet and physical activity  · Surgery  can help you lose weight if you are very obese and have other health problems  There are several types of weight-loss surgery  Ask your healthcare provider for more information  Be successful losing weight:   · Set small, realistic goals  An example of a small goal is to walk for 20 minutes 5 days a week  Anther goal is to lose 5% of your body weight  · Tell friends, family members, and coworkers about your goals  and ask for their support  Ask a friend to lose weight with you, or join a weight-loss support group  · Identify foods or triggers that may cause you to overeat , and find ways to avoid them  Remove tempting high-calorie foods from your home and workplace  Place a bowl of fresh fruit on your kitchen counter  If stress causes you to eat, then find other ways to cope with stress  · Keep a diary to track what you eat and drink  Also write down how many minutes of physical activity you do each day  Weigh yourself once a week and record it in your diary  Eating changes:   You will need to eat 500 to 1,000 fewer calories each day than you currently eat to lose 1 to 2 pounds a week  The following changes will help you cut calories:  · Eat smaller portions  Use small plates, no larger than 9 inches in diameter  Fill your plate half full of fruits and vegetables  Measure your food using measuring cups until you know what a serving size looks like  · Eat 3 meals and 1 or 2 snacks each day  Plan your meals in advance  Tabitha Figures and eat at home most of the time  Eat slowly  Do not skip meals  Skipping meals can lead to overeating later in the day  This can make it harder for you to lose weight  Talk with a dietitian to help you make a meal plan and schedule that is right for you  · Eat fruits and vegetables at every meal   They are low in calories and high in fiber, which makes you feel full  Do not add butter, margarine, or cream sauce to vegetables  Use herbs to season steamed vegetables  · Eat less fat and fewer fried foods  Eat more baked or grilled chicken and fish  These protein sources are lower in calories and fat than red meat  Limit fast food  Dress your salads with olive oil and vinegar instead of bottled dressing  · Limit the amount of sugar you eat  Do not drink sugary beverages  Limit alcohol  Activity changes:  Physical activity is good for your body in many ways  It helps you burn calories and build strong muscles  It decreases stress and depression, and improves your mood  It can also help you sleep better  Talk to your healthcare provider before you begin an exercise program   · Exercise for at least 30 minutes 5 days a week  Start slowly  Set aside time each day for physical activity that you enjoy and that is convenient for you  It is best to do both weight training and an activity that increases your heart rate, such as walking, bicycling, or swimming  · Find ways to be more active  Do yard work and housecleaning  Walk up the stairs instead of using elevators   Spend your leisure time going to events that require walking, such as outdoor festivals or fairs  This extra physical activity can help you lose weight and keep it off  Follow up with your healthcare provider as directed: You may need to meet with a dietitian  Write down your questions so you remember to ask them during your visits  © Copyright [address] Information is for End User's use only and may not be sold, redistributed or otherwise used for commercial purposes  All illustrations and images included in CareNotes® are the copyrighted property of [employer]  or [facility] Jesse Clayton   The above information is an  only  It is not intended as medical advice for individual conditions or treatments  Talk to your doctor, nurse or pharmacist before following any medical regimen to see if it is safe and effective for you  Medicare Preventive Visit Patient Instructions  Thank you for completing your Welcome to Medicare Visit or Medicare Annual Wellness Visit today  Your next wellness visit will be due in one year (5/12/2022)  The screening/preventive services that you may require over the next 5-10 years are detailed below  Some tests may not apply to you based off risk factors and/or age  Screening tests ordered at today's visit but not completed yet may show as past due  Also, please note that scanned in results may not display below  Preventive Screenings:  Service Recommendations Previous Testing/Comments   Colorectal Cancer Screening  · Colonoscopy    · Fecal Occult Blood Test (FOBT)/Fecal Immunochemical Test (FIT)  · Fecal DNA/Cologuard Test  · Flexible Sigmoidoscopy Age: 54-65 years old   Colonoscopy: every 10 years (May be performed more frequently if at higher risk)  OR  FOBT/FIT: every 1 year  OR  Cologuard: every 3 years  OR  Sigmoidoscopy: every 5 years  Screening may be recommended earlier than age 48 if at higher risk for colorectal cancer   Also, an individualized decision between you and your healthcare provider will decide whether screening between the ages of 74-80 would be appropriate  Colonoscopy: Not on file  FOBT/FIT: Not on file  Cologuard: Not on file  Sigmoidoscopy: Not on file    Screening Not Indicated     Prostate Cancer Screening Individualized decision between patient and health care provider in men between ages of 53-78   Medicare will cover every 12 months beginning on the day after your 50th birthday PSA: 1 7 ng/mL     Screening Not Indicated     Hepatitis C Screening Once for adults born between 1945 and 1965  More frequently in patients at high risk for Hepatitis C Hep C Antibody: Not on file        Diabetes Screening 1-2 times per year if you're at risk for diabetes or have pre-diabetes Fasting glucose: No results in last 5 years   A1C: 8 4 %    Screening Not Indicated  History Diabetes   Cholesterol Screening Once every 5 years if you don't have a lipid disorder  May order more often based on risk factors  Lipid panel: 05/05/2021    Screening Not Indicated  History Lipid Disorder      Other Preventive Screenings Covered by Medicare:  1  Abdominal Aortic Aneurysm (AAA) Screening: covered once if your at risk  You're considered to be at risk if you have a family history of AAA or a male between the age of 73-68 who smoking at least 100 cigarettes in your lifetime  2  Lung Cancer Screening: covers low dose CT scan once per year if you meet all of the following conditions: (1) Age 50-69; (2) No signs or symptoms of lung cancer; (3) Current smoker or have quit smoking within the last 15 years; (4) You have a tobacco smoking history of at least 30 pack years (packs per day x number of years you smoked); (5) You get a written order from a healthcare provider    3  Glaucoma Screening: covered annually if you're considered high risk: (1) You have diabetes OR (2) Family history of glaucoma OR (3)  aged 48 and older OR (3)  American aged 72 and older  4  Osteoporosis Screening: covered every 2 years if you meet one of the following conditions: (1) Have a vertebral abnormality; (2) On glucocorticoid therapy for more than 3 months; (3) Have primary hyperparathyroidism; (4) On osteoporosis medications and need to assess response to drug therapy  5  HIV Screening: covered annually if you're between the age of 12-76  Also covered annually if you are younger than 13 and older than 72 with risk factors for HIV infection  For pregnant patients, it is covered up to 3 times per pregnancy  Immunizations:  Immunization Recommendations   Influenza Vaccine Annual influenza vaccination during flu season is recommended for all persons aged >= 6 months who do not have contraindications   Pneumococcal Vaccine (Prevnar and Pneumovax)  * Prevnar = PCV13  * Pneumovax = PPSV23 Adults 25-60 years old: 1-3 doses may be recommended based on certain risk factors  Adults 72 years old: Prevnar (PCV13) vaccine recommended followed by Pneumovax (PPSV23) vaccine  If already received PPSV23 since turning 65, then PCV13 recommended at least one year after PPSV23 dose  Hepatitis B Vaccine 3 dose series if at intermediate or high risk (ex: diabetes, end stage renal disease, liver disease)   Tetanus (Td) Vaccine - COST NOT COVERED BY MEDICARE PART B Following completion of primary series, a booster dose should be given every 10 years to maintain immunity against tetanus  Td may also be given as tetanus wound prophylaxis  Tdap Vaccine - COST NOT COVERED BY MEDICARE PART B Recommended at least once for all adults  For pregnant patients, recommended with each pregnancy  Shingles Vaccine (Shingrix) - COST NOT COVERED BY MEDICARE PART B  2 shot series recommended in those aged 48 and above     Health Maintenance Due:  There are no preventive care reminders to display for this patient    Immunizations Due:      Topic Date Due    DTaP,Tdap,and Td Vaccines (2 - Td) 01/11/2021     Advance Directives   What are advance directives? Advance directives are legal documents that state your wishes and plans for medical care  These plans are made ahead of time in case you lose your ability to make decisions for yourself  Advance directives can apply to any medical decision, such as the treatments you want, and if you want to donate organs  What are the types of advance directives? There are many types of advance directives, and each state has rules about how to use them  You may choose a combination of any of the following:  · Living will: This is a written record of the treatment you want  You can also choose which treatments you do not want, which to limit, and which to stop at a certain time  This includes surgery, medicine, IV fluid, and tube feedings  · Durable power of  for healthcare [facility]): This is a written record that states who you want to make healthcare choices for you when you are unable to make them for yourself  This person, called a proxy, is usually a family member or a friend  You may choose more than 1 proxy  · Do not resuscitate (DNR) order:  A DNR order is used in case your heart stops beating or you stop breathing  It is a request not to have certain forms of treatment, such as CPR  A DNR order may be included in other types of advance directives  · Medical directive: This covers the care that you want if you are in a coma, near death, or unable to make decisions for yourself  You can list the treatments you want for each condition  Treatment may include pain medicine, surgery, blood transfusions, dialysis, IV or tube feedings, and a ventilator (breathing machine)  · Values history: This document has questions about your views, beliefs, and how you feel and think about life  This information can help others choose the care that you would choose  Why are advance directives important? An advance directive helps you control your care   Although spoken wishes may be used, it is better to have your wishes written down  Spoken wishes can be misunderstood, or not followed  Treatments may be given even if you do not want them  An advance directive may make it easier for your family to make difficult choices about your care  Weight Management   Why it is important to manage your weight:  Being overweight increases your risk of health conditions such as heart disease, high blood pressure, type 2 diabetes, and certain types of cancer  It can also increase your risk for osteoarthritis, sleep apnea, and other respiratory problems  Aim for a slow, steady weight loss  Even a small amount of weight loss can lower your risk of health problems  How to lose weight safely:  A safe and healthy way to lose weight is to eat fewer calories and get regular exercise  You can lose up about 1 pound a week by decreasing the number of calories you eat by 500 calories each day  Healthy meal plan for weight management:  A healthy meal plan includes a variety of foods, contains fewer calories, and helps you stay healthy  A healthy meal plan includes the following:  · Eat whole-grain foods more often  A healthy meal plan should contain fiber  Fiber is the part of grains, fruits, and vegetables that is not broken down by your body  Whole-grain foods are healthy and provide extra fiber in your diet  Some examples of whole-grain foods are whole-wheat breads and pastas, oatmeal, brown rice, and bulgur  · Eat a variety of vegetables every day  Include dark, leafy greens such as spinach, kale, regan greens, and mustard greens  Eat yellow and orange vegetables such as carrots, sweet potatoes, and winter squash  · Eat a variety of fruits every day  Choose fresh or canned fruit (canned in its own juice or light syrup) instead of juice  Fruit juice has very little or no fiber  · Eat low-fat dairy foods  Drink fat-free (skim) milk or 1% milk  Eat fat-free yogurt and low-fat cottage cheese   Try low-fat cheeses such as mozzarella and other reduced-fat cheeses  · Choose meat and other protein foods that are low in fat  Choose beans or other legumes such as split peas or lentils  Choose fish, skinless poultry (chicken or turkey), or lean cuts of red meat (beef or pork)  Before you cook meat or poultry, cut off any visible fat  · Use less fat and oil  Try baking foods instead of frying them  Add less fat, such as margarine, sour cream, regular salad dressing and mayonnaise to foods  Eat fewer high-fat foods  Some examples of high-fat foods include french fries, doughnuts, ice cream, and cakes  · Eat fewer sweets  Limit foods and drinks that are high in sugar  This includes candy, cookies, regular soda, and sweetened drinks  Exercise:  Exercise at least 30 minutes per day on most days of the week  Some examples of exercise include walking, biking, dancing, and swimming  You can also fit in more physical activity by taking the stairs instead of the elevator or parking farther away from stores  Ask your healthcare provider about the best exercise plan for you  © Copyright [employer] 2018 Information is for End User's use only and may not be sold, redistributed or otherwise used for commercial purposes  All illustrations and images included in CareNotes® are the copyrighted property of [employer]  or [facility] Jesse Self      No follow-ups on file  Subjective:      Patient ID: Dottie Jordan is a 81 y o  male      Chief Complaint   Patient presents with    Follow-up     lab work review  Dequan Adair       Pt is being seen at my request to discuss labs  Pt states he feels tired  Urination has been ok  Pt states the sugars at home have been running in the 130's PT feels its his diet        The following portions of the patient's history were reviewed and updated as appropriate: allergies, current medications, past family history, past medical history, past social history, past surgical history and problem list     Review of Systems   Constitutional: Positive for fatigue  Negative for activity change, appetite change, chills, diaphoresis, fever and unexpected weight change  HENT: Negative for congestion, dental problem, ear pain, mouth sores, sinus pressure, sinus pain, sore throat and trouble swallowing  Eyes: Negative for photophobia, discharge and itching  Respiratory: Negative for apnea, chest tightness and shortness of breath  Cardiovascular: Negative for chest pain, palpitations and leg swelling  Gastrointestinal: Negative for abdominal distention, abdominal pain, blood in stool, nausea and vomiting  Endocrine: Negative for cold intolerance, heat intolerance, polydipsia, polyphagia and polyuria  Genitourinary: Negative for difficulty urinating  Musculoskeletal: Negative for arthralgias  Skin: Negative for color change and wound  Neurological: Negative for dizziness, syncope, speech difficulty and headaches  Hematological: Negative for adenopathy  Psychiatric/Behavioral: Negative for agitation and behavioral problems           Current Outpatient Medications   Medication Sig Dispense Refill    Bioflavonoid Products (ZULEYKA-C) 500-200-60 MG TABS Take by mouth      carvedilol (COREG CR) 20 MG 24 hr capsule TAKE 1 CAPSULE BY MOUTH  DAILY 90 capsule 3    cetirizine (ZyrTEC) 10 mg tablet Take 1 tablet (10 mg total) by mouth every morning 90 tablet 1    Cinnamon 500 MG TABS Take by mouth      clotrimazole-betamethasone (LOTRISONE) 1-0 05 % cream Apply topically 2 (two) times a day 30 g 0    fluticasone (FLONASE) 50 mcg/act nasal spray into each nostril      glucose blood (FREESTYLE LITE) test strip by In Vitro route      hydrocortisone 1 % cream Apply topically 4 (four) times a day as needed for irritation 120 g 0    Januvia 50 MG tablet TAKE 1 TABLET BY MOUTH  DAILY 90 tablet 3    metFORMIN (GLUCOPHAGE-XR) 500 mg 24 hr tablet TAKE 1 TABLET BY MOUTH  DAILY WITH DINNER 90 tablet 3    montelukast (SINGULAIR) 10 mg tablet Take 1 tablet (10 mg total) by mouth daily at bedtime 90 tablet 1    Multiple Vitamins-Iron (DAILY MULTIPLE VITAMIN/IRON PO) Take by mouth      olopatadine (PATANOL) 0 1 % ophthalmic solution Administer 1 drop to both eyes 2 (two) times a day 5 mL 3    Omega-3 Fatty Acids (FISH OIL) 1000 MG CPDR Take by mouth      potassium chloride (K-DUR,KLOR-CON) 20 mEq tablet TAKE 1 TABLET BY MOUTH  DAILY 90 tablet 3    simvastatin (ZOCOR) 10 mg tablet TAKE 1 TABLET BY MOUTH  DAILY AT BEDTIME 90 tablet 1    Viibryd 20 MG tablet TAKE 1 TABLET BY MOUTH  DAILY 90 tablet 3    amLODIPine-benazepril (LOTREL) 10-20 MG per capsule Take 1 capsule by mouth daily 90 capsule 1    glipiZIDE (GLUCOTROL XL) 2 5 mg 24 hr tablet Take 1 tablet (2 5 mg total) by mouth daily 90 tablet 1     No current facility-administered medications for this visit  Objective:    /82   Pulse 64   Temp (!) 97 4 °F (36 3 °C)   Resp 20   Ht 5' 11" (1 803 m)   Wt 99 3 kg (219 lb)   SpO2 98%   BMI 30 54 kg/m²        Physical Exam  Vitals signs and nursing note reviewed  Constitutional:       General: He is not in acute distress  Appearance: He is well-developed  He is not diaphoretic  HENT:      Head: Normocephalic and atraumatic  Right Ear: External ear normal       Left Ear: External ear normal       Nose: Nose normal       Mouth/Throat:      Pharynx: No oropharyngeal exudate  Eyes:      General: No scleral icterus  Right eye: No discharge  Left eye: No discharge  Pupils: Pupils are equal, round, and reactive to light  Neck:      Thyroid: No thyromegaly  Cardiovascular:      Rate and Rhythm: Normal rate  Heart sounds: Normal heart sounds  No murmur  Pulmonary:      Effort: Pulmonary effort is normal  No respiratory distress  Breath sounds: Normal breath sounds  No wheezing  Abdominal:      General: Bowel sounds are normal  There is no distension  Palpations: Abdomen is soft  There is no mass  Tenderness: There is no abdominal tenderness  There is no guarding or rebound  Musculoskeletal: Normal range of motion  Skin:     General: Skin is warm and dry  Findings: No erythema or rash  Neurological:      Mental Status: He is alert        Coordination: Coordination normal       Deep Tendon Reflexes: Reflexes normal    Psychiatric:         Behavior: Behavior normal               Recent Results (from the past 672 hour(s))   Olga Byrd Default    Collection Time: 05/05/21  9:30 AM   Result Value Ref Range    White Blood Cell Count 6 0 3 4 - 10 8 x10E3/uL    Red Blood Cell Count 4 79 4 14 - 5 80 x10E6/uL    Hemoglobin 14 7 13 0 - 17 7 g/dL    HCT 42 6 37 5 - 51 0 %    MCV 89 79 - 97 fL    MCH 30 7 26 6 - 33 0 pg    MCHC 34 5 31 5 - 35 7 g/dL    RDW 12 7 11 6 - 15 4 %    Platelet Count 988 823 - 450 x10E3/uL    Neutrophils 57 Not Estab  %    Lymphocytes 28 Not Estab  %    Monocytes 10 Not Estab  %    Eosinophils 4 Not Estab  %    Basophils PCT 1 Not Estab  %    Neutrophils (Absolute) 3 4 1 4 - 7 0 x10E3/uL    Lymphocytes (Absolute) 1 7 0 7 - 3 1 x10E3/uL    Monocytes (Absolute) 0 6 0 1 - 0 9 x10E3/uL    Eosinophils (Absolute) 0 2 0 0 - 0 4 x10E3/uL    Basophils ABS 0 1 0 0 - 0 2 x10E3/uL    Immature Granulocytes 0 Not Estab  %    Immature Granulocytes (Absolute) 0 0 0 0 - 0 1 x10E3/uL   Comprehensive metabolic panel    Collection Time: 05/05/21  9:30 AM   Result Value Ref Range    Glucose, Random 195 (H) 65 - 99 mg/dL    BUN 16 8 - 27 mg/dL    Creatinine 1 47 (H) 0 76 - 1 27 mg/dL    eGFR Non  43 (L) >59 mL/min/1 73    eGFR  50 (L) >59 mL/min/1 73    SL AMB BUN/CREATININE RATIO 11 10 - 24    Sodium 143 134 - 144 mmol/L    Potassium 4 0 3 5 - 5 2 mmol/L    Chloride 104 96 - 106 mmol/L    CO2 24 20 - 29 mmol/L    CALCIUM 9 3 8 6 - 10 2 mg/dL    Protein, Total 6 8 6 0 - 8 5 g/dL    Albumin 4 5 3 6 - 4 6 g/dL    Globulin, Total 2 3 1 5 - 4 5 g/dL    Albumin/Globulin Ratio 2 0 1 2 - 2 2    TOTAL BILIRUBIN 0 6 0 0 - 1 2 mg/dL    Alk Phos Isoenzymes 54 39 - 117 IU/L    AST 21 0 - 40 IU/L    ALT 23 0 - 44 IU/L   Lipid panel    Collection Time: 05/05/21  9:30 AM   Result Value Ref Range    Cholesterol, Total 164 100 - 199 mg/dL    Triglycerides 292 (H) 0 - 149 mg/dL    HDL 41 >39 mg/dL    LDL Calculated 76 0 - 99 mg/dL   Microalbumin / creatinine urine ratio    Collection Time: 05/05/21  9:30 AM   Result Value Ref Range    Creatinine, Urine 166 1 Not Estab  mg/dL    Microalbum  ,U,Random 30 3 Not Estab  ug/mL    Microalb/Creat Ratio 18 0 - 29 mg/g creat   Cardiovascular Report    Collection Time: 05/05/21  9:30 AM   Result Value Ref Range    Interpretation Note    [facility] Kidney Stone Panel    Collection Time: 05/05/21  9:30 AM   Result Value Ref Range    Interpretation Note    Hemoglobin A1c (w/out EAG)    Collection Time: 05/05/21  9:30 AM   Result Value Ref Range    Hemoglobin A1C 8 4 (H) 4 8 - 5 6 %         Emanuel Laguerre DO  BMI Counseling: Body mass index is 30 54 kg/m²  The BMI is above normal  Nutrition recommendations include reducing portion sizes

## 2021-05-27 ENCOUNTER — CONSULT (OUTPATIENT)
Dept: NEPHROLOGY | Facility: CLINIC | Age: 86
End: 2021-05-27
Payer: COMMERCIAL

## 2021-05-27 VITALS
HEIGHT: 71 IN | BODY MASS INDEX: 30.38 KG/M2 | DIASTOLIC BLOOD PRESSURE: 68 MMHG | SYSTOLIC BLOOD PRESSURE: 138 MMHG | WEIGHT: 217 LBS

## 2021-05-27 DIAGNOSIS — N18.31 CHRONIC KIDNEY DISEASE (CKD) STAGE G3A/A1, MODERATELY DECREASED GLOMERULAR FILTRATION RATE (GFR) BETWEEN 45-59 ML/MIN/1.73 SQUARE METER AND ALBUMINURIA CREATININE RATIO LESS THAN 30 MG/G (HCC): ICD-10-CM

## 2021-05-27 DIAGNOSIS — I12.9 BENIGN HYPERTENSION WITH CKD (CHRONIC KIDNEY DISEASE) STAGE III (HCC): Primary | ICD-10-CM

## 2021-05-27 DIAGNOSIS — I10 BENIGN ESSENTIAL HYPERTENSION: ICD-10-CM

## 2021-05-27 DIAGNOSIS — E87.6 HYPOKALEMIA: ICD-10-CM

## 2021-05-27 DIAGNOSIS — N18.30 BENIGN HYPERTENSION WITH CKD (CHRONIC KIDNEY DISEASE) STAGE III (HCC): Primary | ICD-10-CM

## 2021-05-27 LAB
SL AMB  POCT GLUCOSE, UA: NEGATIVE
SL AMB LEUKOCYTE ESTERASE,UA: NEGATIVE
SL AMB POCT BILIRUBIN,UA: NEGATIVE
SL AMB POCT BLOOD,UA: NEGATIVE
SL AMB POCT CLARITY,UA: CLEAR
SL AMB POCT COLOR,UA: ABNORMAL
SL AMB POCT KETONES,UA: NEGATIVE
SL AMB POCT NITRITE,UA: NEGATIVE
SL AMB POCT PH,UA: 5
SL AMB POCT SPECIFIC GRAVITY,UA: 1.01
SL AMB POCT URINE PROTEIN: 15
SL AMB POCT UROBILINOGEN: NEGATIVE

## 2021-05-27 PROCEDURE — 3075F SYST BP GE 130 - 139MM HG: CPT | Performed by: INTERNAL MEDICINE

## 2021-05-27 PROCEDURE — 1036F TOBACCO NON-USER: CPT | Performed by: INTERNAL MEDICINE

## 2021-05-27 PROCEDURE — 1160F RVW MEDS BY RX/DR IN RCRD: CPT | Performed by: INTERNAL MEDICINE

## 2021-05-27 PROCEDURE — 99204 OFFICE O/P NEW MOD 45 MIN: CPT | Performed by: INTERNAL MEDICINE

## 2021-05-27 PROCEDURE — 3078F DIAST BP <80 MM HG: CPT | Performed by: INTERNAL MEDICINE

## 2021-05-27 PROCEDURE — 81002 URINALYSIS NONAUTO W/O SCOPE: CPT | Performed by: INTERNAL MEDICINE

## 2021-05-27 RX ORDER — POTASSIUM CHLORIDE 20 MEQ/1
20 TABLET, EXTENDED RELEASE ORAL EVERY OTHER DAY
Qty: 90 TABLET | Refills: 3 | Status: SHIPPED | OUTPATIENT
Start: 2021-05-27 | End: 2021-06-16

## 2021-05-27 NOTE — PROGRESS NOTES
NEPHROLOGY OUTPATIENT CONSULTATION   Thomas Ryan 81 y o  male MRN: [id number]  Date: 5/27/2021  Reason for consultation:   Chief Complaint   Patient presents with    Consult    Chronic Kidney Disease     ASSESSMENT AND PLAN:  CKD stage IIIA, baseline creatinine 1 3 to 1 4 going back to 2018, 1 2 going back to 2016  -last creatinine 1 4 in May 2021 overall stable at baseline  -CKD suspect in the setting of long-term hypertension causing hypertensive arteriosclerosis, diabetes, age-related nephron loss  -UA relatively bland in the office today, shows trace proteinuria  Urine microalbumin/creatinine ratio nonsignificant in May 2021   -if creatinine worsens, consider renal ultrasound in the future  -avoid nephrotoxins or NSAIDs  -advised to drink more free water to stay hydrated    Hypertension  -blood pressure acceptable in the office today  -recently HCTZ was discontinued by PCP  Currently remains on amlodipine/benazepril 10/20 mg daily, Coreg 20 mg daily   -continue current antihypertensive regimen  -given patient remains off HCTZ, would recommend reducing potassium supplements to 20 mEq every other day  Repeat serum potassium next month and if remains within normal range, consider discontinue  -salt restricted diet recommended  -patient does not check BP at home  Advised him to bring BP machine during next office visit  Advised to check BP at home and call back if remains persistently greater than 140/90  Diabetes, last hemoglobin A1c 8 4 in May 2021  Further management as per PCP  HISTORY OF PRESENT ILLNESS:  Thomas Ryan is a 81y o  year old male with medical issues of hypertension for 25 years, diabetes for 10 years, hyperlipidemia, CKD stage 3 with baseline creatinine 1 3 to 1 4 since 2018, prior 1 2 going back to 2016 who presents for initial consultation for CKD  Old medical records including prior creatinine values were reviewed from  [facility] records    Patient's initial baseline creatinine 1 2 going back to 2016 although since 2018 seems to be around 1 3 to 1 4  Last serum creatinine stable at baseline  He has upper arm BP machine at home although does not check BP on regular basis  Recently HCTZ was discontinued by PCP and amlodipine/benazepril was increased  His BP acceptable in the office today  He admits not to be drinking enough water  Denies any urinary complaint  No recent NSAID exposure  Denies chest pain, shortness of breath, nausea, vomiting  REVIEW OF SYSTEMS:    More than 10 point review of systems were obtained and discussed in length with the patient  Complete review of systems were negative / unremarkable except mentioned in the HPI section  PHYSICAL EXAM:  Vitals:    05/27/21 1512 05/27/21 1544   BP:  138/68   Weight: 98 4 kg (217 lb)    Height: 5' 11" (1 803 m)      Body mass index is 30 27 kg/m²  Physical Exam  Vitals signs reviewed  Constitutional:       Appearance: He is well-developed  HENT:      Head: Normocephalic and atraumatic  Right Ear: External ear normal       Left Ear: External ear normal    Eyes:      General: No scleral icterus  Conjunctiva/sclera: Conjunctivae normal    Cardiovascular:      Comments: S1, S2 present  Pulmonary:      Effort: Pulmonary effort is normal  No respiratory distress  Breath sounds: Normal breath sounds  No wheezing or rales  Abdominal:      General: Bowel sounds are normal  There is no distension  Palpations: Abdomen is soft  Tenderness: There is no abdominal tenderness  Musculoskeletal:         General: No tenderness  Right lower leg: No edema  Left lower leg: No edema  Lymphadenopathy:      Cervical: No cervical adenopathy  Skin:     Findings: No rash  Neurological:      Mental Status: He is alert and oriented to person, place, and time     Psychiatric:         Behavior: Behavior normal          PAST MEDICAL HISTORY:  Past Medical History:   Diagnosis Date    Ascending cholangitis     Last assessed 1/22/2015     Atherosclerosis of arteries of extremities ([address] )     Resolved 12/19/2016        PAST SURGICAL HISTORY:  Past Surgical History:   Procedure Laterality Date    APPENDECTOMY      Last assessed 1/22/2015     CHOLECYSTECTOMY      Last assessed 1/22/2015    [facility] FRACTURE SURGERY      Open treatment of fracture of the radial shaft   Last assessed 1/22/2015     TONSILLECTOMY      Last assessed 1/22/2015        ALLERGIES:  Allergies   Allergen Reactions    Pollen Extract Allergic Rhinitis       SOCIAL HISTORY:  Social History     Substance and Sexual Activity   Alcohol Use Yes    Frequency: 2-3 times a week    Drinks per session: 1 or 2     Social History     Substance and Sexual Activity   Drug Use Never     Social History     Tobacco Use   Smoking Status Never Smoker   Smokeless Tobacco Never Used       FAMILY HISTORY:  Family History   Problem Relation Age of Onset    Hypertension Father     Other Father         Gastric ulcer     Heart disease Mother     Hypertension Mother     Liver cancer Mother     Melanoma Mother     Diabetes Brother     Diabetes Family     Diabetes Maternal Grandfather     Mental illness Neg Hx        MEDICATIONS:    Current Outpatient Medications:     amLODIPine-benazepril (LOTREL) 10-20 MG per capsule, Take 1 capsule by mouth daily, Disp: 90 capsule, Rfl: 1    carvedilol (COREG CR) 20 MG 24 hr capsule, TAKE 1 CAPSULE BY MOUTH  DAILY, Disp: 90 capsule, Rfl: 3    cetirizine (ZyrTEC) 10 mg tablet, Take 1 tablet (10 mg total) by mouth every morning, Disp: 90 tablet, Rfl: 1    Cinnamon 500 MG TABS, Take by mouth, Disp: , Rfl:     glipiZIDE (GLUCOTROL XL) 2 5 mg 24 hr tablet, Take 1 tablet (2 5 mg total) by mouth daily, Disp: 90 tablet, Rfl: 1    Januvia 50 MG tablet, TAKE 1 TABLET BY MOUTH  DAILY, Disp: 90 tablet, Rfl: 3    metFORMIN (GLUCOPHAGE-XR) 500 mg 24 hr tablet, TAKE 1 TABLET BY MOUTH  DAILY WITH DINNER, Disp: 90 tablet, Rfl: 3    montelukast (SINGULAIR) 10 mg tablet, Take 1 tablet (10 mg total) by mouth daily at bedtime, Disp: 90 tablet, Rfl: 1    Multiple Vitamins-Iron (DAILY MULTIPLE VITAMIN/IRON PO), Take by mouth, Disp: , Rfl:     Omega-3 Fatty Acids (FISH OIL) 1000 MG CPDR, Take by mouth, Disp: , Rfl:     potassium chloride (K-DUR,KLOR-CON) 20 mEq tablet, Take 1 tablet (20 mEq total) by mouth every other day, Disp: 90 tablet, Rfl: 3    Bioflavonoid Products (ZULEYKA-C) 500-200-60 MG TABS, Take by mouth, Disp: , Rfl:     clotrimazole-betamethasone (LOTRISONE) 1-0 05 % cream, Apply topically 2 (two) times a day, Disp: 30 g, Rfl: 0    fluticasone (FLONASE) 50 mcg/act nasal spray, into each nostril, Disp: , Rfl:     glucose blood (FREESTYLE LITE) test strip, by In Vitro route, Disp: , Rfl:     hydrocortisone 1 % cream, Apply topically 4 (four) times a day as needed for irritation, Disp: 120 g, Rfl: 0    olopatadine (PATANOL) 0 1 % ophthalmic solution, Administer 1 drop to both eyes 2 (two) times a day, Disp: 5 mL, Rfl: 3    simvastatin (ZOCOR) 10 mg tablet, TAKE 1 TABLET BY MOUTH  DAILY AT BEDTIME, Disp: 90 tablet, Rfl: 1    Viibryd 20 MG tablet, TAKE 1 TABLET BY MOUTH  DAILY, Disp: 90 tablet, Rfl: 3    Lab Results:   Results for orders placed or performed in visit on 05/27/21   POCT urine dip   Result Value Ref Range    LEUKOCYTE ESTERASE,UA negative     NITRITE,UA negative     SL AMB POCT UROBILINOGEN negative     POCT URINE PROTEIN 15      PH,UA 5 0     BLOOD,UA negative     SPECIFIC GRAVITY,UA 1 015     KETONES,UA negative     BILIRUBIN,UA negative     GLUCOSE, UA negative      COLOR,UA light yellow     CLARITY,UA clear    Creatinine 1 4 in May 2021  Portions of the record may have been created with voice recognition software  Occasional wrong word or "sound a like" substitutions may have occurred due to the inherent limitations of voice recognition software   Read the chart carefully and recognize, using context, where substitutions have occurred

## 2021-05-28 DIAGNOSIS — B35.4 DERMATOPHYTOSIS OF BODY: ICD-10-CM

## 2021-05-28 RX ORDER — CLOTRIMAZOLE AND BETAMETHASONE DIPROPIONATE 10; .64 MG/G; MG/G
CREAM TOPICAL
Qty: 30 G | Refills: 0 | Status: SHIPPED | OUTPATIENT
Start: 2021-05-28 | End: 2022-02-01 | Stop reason: SDUPTHER

## 2021-06-03 ENCOUNTER — OFFICE VISIT (OUTPATIENT)
Dept: PODIATRY | Facility: CLINIC | Age: 86
End: 2021-06-03
Payer: COMMERCIAL

## 2021-06-03 VITALS — HEIGHT: 71 IN | BODY MASS INDEX: 30.38 KG/M2 | WEIGHT: 217 LBS | RESPIRATION RATE: 17 BRPM

## 2021-06-03 DIAGNOSIS — M79.671 PAIN IN BOTH FEET: ICD-10-CM

## 2021-06-03 DIAGNOSIS — E11.42 DIABETIC POLYNEUROPATHY ASSOCIATED WITH TYPE 2 DIABETES MELLITUS (HCC): Primary | ICD-10-CM

## 2021-06-03 DIAGNOSIS — M79.672 PAIN IN BOTH FEET: ICD-10-CM

## 2021-06-03 DIAGNOSIS — I70.209 PERIPHERAL ARTERIOSCLEROSIS (HCC): ICD-10-CM

## 2021-06-03 DIAGNOSIS — L84 CORNS: ICD-10-CM

## 2021-06-03 PROCEDURE — 11056 PARNG/CUTG B9 HYPRKR LES 2-4: CPT | Performed by: PODIATRIST

## 2021-06-15 LAB
ALBUMIN SERPL-MCNC: 4.4 G/DL (ref 3.6–4.6)
ALBUMIN/GLOB SERPL: 1.9 {RATIO} (ref 1.2–2.2)
ALP SERPL-CCNC: 58 IU/L (ref 48–121)
ALT SERPL-CCNC: 22 IU/L (ref 0–44)
AST SERPL-CCNC: 20 IU/L (ref 0–40)
BILIRUB SERPL-MCNC: 0.6 MG/DL (ref 0–1.2)
BUN SERPL-MCNC: 15 MG/DL (ref 8–27)
BUN/CREAT SERPL: 12 (ref 10–24)
CALCIUM SERPL-MCNC: 8.9 MG/DL (ref 8.6–10.2)
CHLORIDE SERPL-SCNC: 105 MMOL/L (ref 96–106)
CO2 SERPL-SCNC: 21 MMOL/L (ref 20–29)
CREAT SERPL-MCNC: 1.29 MG/DL (ref 0.76–1.27)
GLOBULIN SER-MCNC: 2.3 G/DL (ref 1.5–4.5)
GLUCOSE SERPL-MCNC: 162 MG/DL (ref 65–99)
HBA1C MFR BLD: 7.5 % (ref 4.8–5.6)
MICRODELETION SYND BLD/T FISH: NORMAL
POTASSIUM SERPL-SCNC: 4 MMOL/L (ref 3.5–5.2)
PROT SERPL-MCNC: 6.7 G/DL (ref 6–8.5)
SL AMB EGFR AFRICAN AMERICAN: 58 ML/MIN/1.73
SL AMB EGFR NON AFRICAN AMERICAN: 50 ML/MIN/1.73
SODIUM SERPL-SCNC: 141 MMOL/L (ref 134–144)

## 2021-06-16 ENCOUNTER — TELEPHONE (OUTPATIENT)
Dept: OTHER | Facility: HOSPITAL | Age: 86
End: 2021-06-16

## 2021-06-16 LAB — POTASSIUM SERPL-SCNC: 4 MMOL/L (ref 3.5–5.2)

## 2021-06-16 NOTE — TELEPHONE ENCOUNTER
Call patient to review most recent lab work  No answer, left message to return call to office  His potassium was 4 0  Okay to discontinue salt tabs  Thank you

## 2021-07-07 DIAGNOSIS — E11.42 DIABETIC POLYNEUROPATHY ASSOCIATED WITH TYPE 2 DIABETES MELLITUS (HCC): ICD-10-CM

## 2021-07-07 DIAGNOSIS — E78.2 MIXED HYPERLIPIDEMIA: ICD-10-CM

## 2021-07-07 RX ORDER — SIMVASTATIN 10 MG
10 TABLET ORAL
Qty: 90 TABLET | Refills: 1 | Status: SHIPPED | OUTPATIENT
Start: 2021-07-07 | End: 2021-10-19

## 2021-09-11 DIAGNOSIS — E11.42 DIABETIC POLYNEUROPATHY ASSOCIATED WITH TYPE 2 DIABETES MELLITUS (HCC): ICD-10-CM

## 2021-09-11 DIAGNOSIS — I10 BENIGN ESSENTIAL HYPERTENSION: ICD-10-CM

## 2021-09-13 RX ORDER — AMLODIPINE BESYLATE AND BENAZEPRIL HYDROCHLORIDE 10; 20 MG/1; MG/1
1 CAPSULE ORAL DAILY
Qty: 90 CAPSULE | Refills: 3 | Status: SHIPPED | OUTPATIENT
Start: 2021-09-13 | End: 2022-06-29

## 2021-09-13 RX ORDER — GLIPIZIDE 2.5 MG/1
TABLET, EXTENDED RELEASE ORAL
Qty: 90 TABLET | Refills: 3 | Status: SHIPPED | OUTPATIENT
Start: 2021-09-13 | End: 2022-06-13 | Stop reason: SDUPTHER

## 2021-09-14 LAB
ALBUMIN SERPL-MCNC: 4.6 G/DL (ref 3.6–4.6)
ALBUMIN/GLOB SERPL: 2.1 {RATIO} (ref 1.2–2.2)
ALP SERPL-CCNC: 62 IU/L (ref 44–121)
ALT SERPL-CCNC: 23 IU/L (ref 0–44)
AST SERPL-CCNC: 21 IU/L (ref 0–40)
BILIRUB SERPL-MCNC: 0.7 MG/DL (ref 0–1.2)
BUN SERPL-MCNC: 15 MG/DL (ref 8–27)
BUN/CREAT SERPL: 11 (ref 10–24)
CALCIUM SERPL-MCNC: 9.3 MG/DL (ref 8.6–10.2)
CHLORIDE SERPL-SCNC: 104 MMOL/L (ref 96–106)
CO2 SERPL-SCNC: 23 MMOL/L (ref 20–29)
CREAT SERPL-MCNC: 1.32 MG/DL (ref 0.76–1.27)
GLOBULIN SER-MCNC: 2.2 G/DL (ref 1.5–4.5)
GLUCOSE SERPL-MCNC: 158 MG/DL (ref 65–99)
HBA1C MFR BLD: 7 % (ref 4.8–5.6)
MICRODELETION SYND BLD/T FISH: NORMAL
POTASSIUM SERPL-SCNC: 4 MMOL/L (ref 3.5–5.2)
PROT SERPL-MCNC: 6.8 G/DL (ref 6–8.5)
SL AMB EGFR AFRICAN AMERICAN: 56 ML/MIN/1.73
SL AMB EGFR NON AFRICAN AMERICAN: 49 ML/MIN/1.73
SODIUM SERPL-SCNC: 143 MMOL/L (ref 134–144)

## 2021-09-15 DIAGNOSIS — E11.42 DIABETIC POLYNEUROPATHY ASSOCIATED WITH TYPE 2 DIABETES MELLITUS (HCC): ICD-10-CM

## 2021-09-16 RX ORDER — SITAGLIPTIN 50 MG/1
TABLET, FILM COATED ORAL
Qty: 90 TABLET | Refills: 3 | Status: SHIPPED | OUTPATIENT
Start: 2021-09-16 | End: 2022-06-29

## 2021-09-16 RX ORDER — METFORMIN HYDROCHLORIDE 500 MG/1
TABLET, EXTENDED RELEASE ORAL
Qty: 90 TABLET | Refills: 3 | Status: SHIPPED | OUTPATIENT
Start: 2021-09-16 | End: 2022-06-29

## 2021-09-17 ENCOUNTER — OFFICE VISIT (OUTPATIENT)
Dept: PODIATRY | Facility: CLINIC | Age: 86
End: 2021-09-17
Payer: COMMERCIAL

## 2021-09-17 VITALS
RESPIRATION RATE: 16 BRPM | BODY MASS INDEX: 30.38 KG/M2 | WEIGHT: 217 LBS | HEART RATE: 69 BPM | SYSTOLIC BLOOD PRESSURE: 134 MMHG | DIASTOLIC BLOOD PRESSURE: 74 MMHG | HEIGHT: 71 IN

## 2021-09-17 DIAGNOSIS — M79.671 PAIN IN BOTH FEET: ICD-10-CM

## 2021-09-17 DIAGNOSIS — L84 CORNS: ICD-10-CM

## 2021-09-17 DIAGNOSIS — E11.42 DIABETIC POLYNEUROPATHY ASSOCIATED WITH TYPE 2 DIABETES MELLITUS (HCC): Primary | ICD-10-CM

## 2021-09-17 DIAGNOSIS — I70.209 PERIPHERAL ARTERIOSCLEROSIS (HCC): ICD-10-CM

## 2021-09-17 DIAGNOSIS — M79.672 PAIN IN BOTH FEET: ICD-10-CM

## 2021-09-17 PROCEDURE — 11056 PARNG/CUTG B9 HYPRKR LES 2-4: CPT | Performed by: PODIATRIST

## 2021-09-20 ENCOUNTER — OFFICE VISIT (OUTPATIENT)
Dept: OBGYN CLINIC | Facility: CLINIC | Age: 86
End: 2021-09-20
Payer: COMMERCIAL

## 2021-09-20 VITALS
HEIGHT: 71 IN | BODY MASS INDEX: 30.8 KG/M2 | WEIGHT: 220 LBS | SYSTOLIC BLOOD PRESSURE: 173 MMHG | TEMPERATURE: 98 F | HEART RATE: 65 BPM | DIASTOLIC BLOOD PRESSURE: 80 MMHG

## 2021-09-20 DIAGNOSIS — M65.342 TRIGGER RING FINGER OF LEFT HAND: Primary | ICD-10-CM

## 2021-09-20 PROCEDURE — 1036F TOBACCO NON-USER: CPT | Performed by: ORTHOPAEDIC SURGERY

## 2021-09-20 PROCEDURE — 99203 OFFICE O/P NEW LOW 30 MIN: CPT | Performed by: ORTHOPAEDIC SURGERY

## 2021-09-20 PROCEDURE — 1160F RVW MEDS BY RX/DR IN RCRD: CPT | Performed by: ORTHOPAEDIC SURGERY

## 2021-09-20 NOTE — PROGRESS NOTES
Assessment/Plan:  1  Trigger ring finger of left hand         Scribe Attestation    I,:  Diamond Simmons am acting as a scribe while in the presence of the attending physician :       I,:  Carito Chavez, DO personally performed the services described in this documentation    as scribed in my presence :         Leia Albert is a pleasant 81year old who presents to the office today for an initial evaluation of his left ring finger  Upon evaluation today, he has clinical findings consistent with left ring finger trigger finger  Etiology of trigger finger was discussed with the patient  Non-operative treatments were discussed with the patient in the forms of a corticosteroid injection  We discussed the risks and benefits of corticosteroid injection today in the office and he did elect to proceed  The left ring finger A1 Pulley injection was administered without issue and well tolerated by the patient  This was done under sterile technique  Post injection instructions were provided  He understands can be repeated no sooner than three months  He is a diabetic and I discussed with the patient that there is a possibility of his blood sugar raising  He was instructed to monitor his blood sugar for the next couple of days and if his blood sugars get too high he should call his PCP  I discussed with the patient that I do limit the number of injections to 3 due to a risk of tendon rupture  I will follow-up with him in 3 months for a clinical re-evaluation  He understood and had no further questions  Subjective:   Patient ID: Shahnaz Bolton is a 81 y o  male who presents to the office today for an initial evaluation of his left ring finger  He states that a couple months ago he noticed pain and locking  He states that he will experience a sharp pain with any type of gripping  He denies any numbness or tingling  He states that he has not taken any over-the-counter anti-inflammatories or use ice to help alleviate his pain    He states that he does have a trigger finger of his right ring finger that has been ongoing for the past 10 years but notes no pain with activities  He states that he does not want any treatment for his right ring finger trigger finger at today's visit  Review of Systems   Constitutional: Negative for chills, fever and unexpected weight change  HENT: Negative for hearing loss, nosebleeds and sore throat  Eyes: Negative for pain, redness and visual disturbance  Respiratory: Negative for cough, shortness of breath and wheezing  Cardiovascular: Negative for chest pain, palpitations and leg swelling  Gastrointestinal: Negative for abdominal pain, nausea and vomiting  Endocrine: Negative for polyphagia and polyuria  Genitourinary: Negative for dysuria and hematuria  Musculoskeletal: Negative for arthralgias, gait problem and joint swelling  Skin: Negative for rash and wound  Neurological: Negative for dizziness, numbness and headaches  Psychiatric/Behavioral: Negative for decreased concentration  The patient is not nervous/anxious  Past Medical History:   Diagnosis Date    Ascending cholangitis     Last assessed 1/22/2015     Atherosclerosis of arteries of extremities ([facility] [address] )     Resolved 12/19/2016        Past Surgical History:   Procedure Laterality Date    APPENDECTOMY      Last assessed 1/22/2015     CHOLECYSTECTOMY      Last assessed 1/22/2015    [facility]      Open treatment of fracture of the radial shaft   Last assessed 1/22/2015     TONSILLECTOMY      Last assessed 1/22/2015        Family History   Problem Relation Age of Onset    Hypertension Father     Other Father         Gastric ulcer     Heart disease Mother     Hypertension Mother     Liver cancer Mother     Melanoma Mother     Diabetes Brother     Diabetes Family     Diabetes Maternal Grandfather     Mental illness Neg Hx        Social History     Occupational History    Not on file   Tobacco Use    Smoking status: Never Smoker    Smokeless tobacco: Never Used   Vaping Use    Vaping Use: Never used   Substance and Sexual Activity    Alcohol use:  Yes    Drug use: Never    Sexual activity: Not on file         Current Outpatient Medications:     amLODIPine-benazepril (LOTREL) 10-20 MG per capsule, TAKE 1 CAPSULE BY MOUTH  DAILY, Disp: 90 capsule, Rfl: 3    Bioflavonoid Products (ZULEYKA-C) 500-200-60 MG TABS, Take by mouth, Disp: , Rfl:     carvedilol (COREG CR) 20 MG 24 hr capsule, TAKE 1 CAPSULE BY MOUTH  DAILY, Disp: 90 capsule, Rfl: 3    cetirizine (ZyrTEC) 10 mg tablet, Take 1 tablet (10 mg total) by mouth every morning, Disp: 90 tablet, Rfl: 1    Cinnamon 500 MG TABS, Take by mouth, Disp: , Rfl:     clotrimazole-betamethasone (LOTRISONE) 1-0 05 % cream, APPLY TO AFFECTED AREA TWICE A DAY, Disp: 30 g, Rfl: 0    fluticasone (FLONASE) 50 mcg/act nasal spray, into each nostril, Disp: , Rfl:     glipiZIDE (GLUCOTROL XL) 2 5 mg 24 hr tablet, TAKE 1 TABLET BY MOUTH  DAILY, Disp: 90 tablet, Rfl: 3    glucose blood (FREESTYLE LITE) test strip, by In Vitro route, Disp: , Rfl:     hydrocortisone 1 % cream, Apply topically 4 (four) times a day as needed for irritation, Disp: 120 g, Rfl: 0    Januvia 50 MG tablet, TAKE 1 TABLET BY MOUTH  DAILY, Disp: 90 tablet, Rfl: 3    metFORMIN (GLUCOPHAGE-XR) 500 mg 24 hr tablet, TAKE 1 TABLET BY MOUTH  DAILY WITH DINNER, Disp: 90 tablet, Rfl: 3    montelukast (SINGULAIR) 10 mg tablet, Take 1 tablet (10 mg total) by mouth daily at bedtime, Disp: 90 tablet, Rfl: 1    Multiple Vitamins-Iron (DAILY MULTIPLE VITAMIN/IRON PO), Take by mouth, Disp: , Rfl:     olopatadine (PATANOL) 0 1 % ophthalmic solution, Administer 1 drop to both eyes 2 (two) times a day, Disp: 5 mL, Rfl: 3    Omega-3 Fatty Acids (FISH OIL) 1000 MG CPDR, Take by mouth, Disp: , Rfl:     simvastatin (ZOCOR) 10 mg tablet, Take 1 tablet (10 mg total) by mouth daily at bedtime, Disp: 90 tablet, Rfl: 1   Viibryd 20 MG tablet, TAKE 1 TABLET BY MOUTH  DAILY, Disp: 90 tablet, Rfl: 3    Allergies   Allergen Reactions    Pollen Extract Allergic Rhinitis       Objective:  Vitals:    09/20/21 1126   BP: (!) 173/80   Pulse: 65   Temp: 98 °F (36 7 °C)       Ortho Exam   Left Ring Finger  Obvious Triggering  TTP A1 Pulley  Full Composite Fist  Compartments soft  Brisk capillary refill  S/m intact median, radial, and ulnar nerve     Physical Exam  Vitals reviewed  Constitutional:       Appearance: He is well-developed  HENT:      Head: Normocephalic and atraumatic  Eyes:      Conjunctiva/sclera: Conjunctivae normal       Pupils: Pupils are equal, round, and reactive to light  Cardiovascular:      Rate and Rhythm: Normal rate  Pulmonary:      Effort: Pulmonary effort is normal  No respiratory distress  Musculoskeletal:      Cervical back: Normal range of motion and neck supple  Comments: As noted in HPI   Skin:     General: Skin is warm and dry  Neurological:      Mental Status: He is alert and oriented to person, place, and time  Psychiatric:         Behavior: Behavior normal          I have personally reviewed pertinent films in PACS and my interpretation is as follows: No new images reviewed today

## 2021-10-19 DIAGNOSIS — E11.42 DIABETIC POLYNEUROPATHY ASSOCIATED WITH TYPE 2 DIABETES MELLITUS (HCC): ICD-10-CM

## 2021-10-19 DIAGNOSIS — E78.2 MIXED HYPERLIPIDEMIA: ICD-10-CM

## 2021-10-19 RX ORDER — SIMVASTATIN 10 MG
TABLET ORAL
Qty: 90 TABLET | Refills: 3 | Status: SHIPPED | OUTPATIENT
Start: 2021-10-19

## 2021-10-20 DIAGNOSIS — J30.2 SEASONAL ALLERGIES: ICD-10-CM

## 2021-10-20 RX ORDER — LORATADINE 10 MG
TABLET ORAL
Qty: 90 TABLET | Refills: 1 | Status: SHIPPED | OUTPATIENT
Start: 2021-10-20 | End: 2022-02-01

## 2021-10-20 RX ORDER — MONTELUKAST SODIUM 10 MG/1
TABLET ORAL
Qty: 90 TABLET | Refills: 1 | Status: SHIPPED | OUTPATIENT
Start: 2021-10-20 | End: 2022-02-01

## 2021-10-29 ENCOUNTER — RA CDI HCC (OUTPATIENT)
Dept: OTHER | Facility: HOSPITAL | Age: 86
End: 2021-10-29

## 2021-11-04 ENCOUNTER — OFFICE VISIT (OUTPATIENT)
Dept: FAMILY MEDICINE CLINIC | Facility: CLINIC | Age: 86
End: 2021-11-04
Payer: COMMERCIAL

## 2021-11-04 VITALS
DIASTOLIC BLOOD PRESSURE: 76 MMHG | WEIGHT: 228 LBS | RESPIRATION RATE: 14 BRPM | TEMPERATURE: 98.3 F | OXYGEN SATURATION: 95 % | BODY MASS INDEX: 31.92 KG/M2 | SYSTOLIC BLOOD PRESSURE: 132 MMHG | HEIGHT: 71 IN | HEART RATE: 74 BPM

## 2021-11-04 DIAGNOSIS — F32.A MINOR DEPRESSION: ICD-10-CM

## 2021-11-04 DIAGNOSIS — E78.2 MIXED HYPERLIPIDEMIA: ICD-10-CM

## 2021-11-04 DIAGNOSIS — N18.30 BENIGN HYPERTENSION WITH CKD (CHRONIC KIDNEY DISEASE) STAGE III (HCC): ICD-10-CM

## 2021-11-04 DIAGNOSIS — Z23 NEED FOR INFLUENZA VACCINATION: ICD-10-CM

## 2021-11-04 DIAGNOSIS — I12.9 BENIGN HYPERTENSION WITH CKD (CHRONIC KIDNEY DISEASE) STAGE III (HCC): ICD-10-CM

## 2021-11-04 DIAGNOSIS — N18.31 CHRONIC KIDNEY DISEASE (CKD) STAGE G3A/A1, MODERATELY DECREASED GLOMERULAR FILTRATION RATE (GFR) BETWEEN 45-59 ML/MIN/1.73 SQUARE METER AND ALBUMINURIA CREATININE RATIO LESS THAN 30 MG/G (HCC): ICD-10-CM

## 2021-11-04 DIAGNOSIS — E11.42 DIABETIC POLYNEUROPATHY ASSOCIATED WITH TYPE 2 DIABETES MELLITUS (HCC): Primary | ICD-10-CM

## 2021-11-04 PROCEDURE — 99214 OFFICE O/P EST MOD 30 MIN: CPT | Performed by: FAMILY MEDICINE

## 2021-11-04 PROCEDURE — G0008 ADMIN INFLUENZA VIRUS VAC: HCPCS

## 2021-11-04 PROCEDURE — 1160F RVW MEDS BY RX/DR IN RCRD: CPT | Performed by: FAMILY MEDICINE

## 2021-11-04 PROCEDURE — 90662 IIV NO PRSV INCREASED AG IM: CPT

## 2021-11-04 PROCEDURE — 1036F TOBACCO NON-USER: CPT | Performed by: FAMILY MEDICINE

## 2021-11-19 ENCOUNTER — OFFICE VISIT (OUTPATIENT)
Dept: PODIATRY | Facility: CLINIC | Age: 86
End: 2021-11-19
Payer: COMMERCIAL

## 2021-11-19 VITALS — BODY MASS INDEX: 31.92 KG/M2 | HEIGHT: 71 IN | WEIGHT: 228 LBS

## 2021-11-19 DIAGNOSIS — M79.671 PAIN IN BOTH FEET: ICD-10-CM

## 2021-11-19 DIAGNOSIS — M79.672 PAIN IN BOTH FEET: ICD-10-CM

## 2021-11-19 DIAGNOSIS — E11.42 DIABETIC POLYNEUROPATHY ASSOCIATED WITH TYPE 2 DIABETES MELLITUS (HCC): Primary | ICD-10-CM

## 2021-11-19 DIAGNOSIS — L84 CORNS: ICD-10-CM

## 2021-11-19 DIAGNOSIS — I70.209 PERIPHERAL ARTERIOSCLEROSIS (HCC): ICD-10-CM

## 2021-11-19 PROCEDURE — 11056 PARNG/CUTG B9 HYPRKR LES 2-4: CPT | Performed by: PODIATRIST

## 2021-12-18 ENCOUNTER — IMMUNIZATIONS (OUTPATIENT)
Dept: FAMILY MEDICINE CLINIC | Facility: HOSPITAL | Age: 86
End: 2021-12-18

## 2021-12-18 DIAGNOSIS — Z23 ENCOUNTER FOR IMMUNIZATION: Primary | ICD-10-CM

## 2021-12-18 PROCEDURE — 91300 COVID-19 PFIZER VACC 0.3 ML: CPT

## 2021-12-18 PROCEDURE — 0001A COVID-19 PFIZER VACC 0.3 ML: CPT

## 2022-01-04 DIAGNOSIS — F32.A MINOR DEPRESSION: ICD-10-CM

## 2022-01-05 RX ORDER — VILAZODONE HYDROCHLORIDE 20 MG/1
TABLET ORAL
Qty: 90 TABLET | Refills: 1 | Status: SHIPPED | OUTPATIENT
Start: 2022-01-05 | End: 2022-06-22

## 2022-01-13 DIAGNOSIS — I10 BENIGN ESSENTIAL HYPERTENSION: ICD-10-CM

## 2022-01-14 RX ORDER — CARVEDILOL PHOSPHATE 20 MG/1
CAPSULE, EXTENDED RELEASE ORAL
Qty: 90 CAPSULE | Refills: 1 | Status: SHIPPED | OUTPATIENT
Start: 2022-01-14 | End: 2022-06-29

## 2022-01-24 ENCOUNTER — OFFICE VISIT (OUTPATIENT)
Dept: PODIATRY | Facility: CLINIC | Age: 87
End: 2022-01-24
Payer: COMMERCIAL

## 2022-01-24 VITALS
SYSTOLIC BLOOD PRESSURE: 132 MMHG | BODY MASS INDEX: 31.92 KG/M2 | WEIGHT: 228 LBS | RESPIRATION RATE: 17 BRPM | DIASTOLIC BLOOD PRESSURE: 76 MMHG | HEIGHT: 71 IN

## 2022-01-24 DIAGNOSIS — E11.42 DIABETIC POLYNEUROPATHY ASSOCIATED WITH TYPE 2 DIABETES MELLITUS (HCC): Primary | ICD-10-CM

## 2022-01-24 DIAGNOSIS — M79.672 PAIN IN BOTH FEET: ICD-10-CM

## 2022-01-24 DIAGNOSIS — M79.671 PAIN IN BOTH FEET: ICD-10-CM

## 2022-01-24 DIAGNOSIS — L84 CORNS: ICD-10-CM

## 2022-01-24 DIAGNOSIS — I70.209 PERIPHERAL ARTERIOSCLEROSIS (HCC): ICD-10-CM

## 2022-01-24 PROCEDURE — 11056 PARNG/CUTG B9 HYPRKR LES 2-4: CPT | Performed by: PODIATRIST

## 2022-01-24 NOTE — PROGRESS NOTES
Assessment   Diabetic neuropathy   Pain upon ambulation   Peripheral artery disease   Mycosis of nail   Callus formation   Foot deformity      Plan   Foot exam performed   Patient educated on care of the diabetic foot   All mycotic nails debrided   Calluses debrided without pain or complication         Subjective:  patient, a diabetic white male, complains of pain in his feet with ambulation   No history of trauma     History of Present Illness  HPI: Patient is a diabetic with pain in his feet with ambulation  No history of trauma  Patient is pre-ulcerative calluses  These hurt with ambulation       Review of Systems   Constitutional: feeling poorly, but-- as noted in HPI,-- no fever-- and-- no chills    ENT: nasal discharge, but-- as noted in HPI   Cardiovascular: no complaints of slow or fast heart rate, no chest pain, no palpitations, no leg claudication or lower extremity edema   Respiratory: cough, but-- as noted in HPI   Gastrointestinal: no complaints of abdominal pain, no constipation, no nausea or vomiting, no diarrhea or bloody stools   Genitourinary: no complaints of dysuria or incontinence, no hesitancy, no nocturia, no genital lesion, no inadequacy of penile erection   Musculoskeletal: no complaints of arthralgia, no myalgia, no joint swelling or stiffness, no limb pain or swelling   Integumentary: no complaints of skin rash or lesion, no itching or dry skin, no skin wounds       Active Problems  1  Abnormal EKG (794 31) (R94 31)   2  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)   3  Acute knee pain, right   4  Basal cell carcinoma of skin (173 91) (C44 91)   5  Benign essential hypertension (401 1) (I10)   6  BMI 31 0-31 9,adult (V85 31) (Z68 31)   7  Callus (700) (L84)   8  Cough (786 2) (R05)   9  Deformity of ankle and foot, acquired (736 70) (M21 969)   10  Diabetes mellitus with neuropathy (250 60,357 2) (E11 40)   11  Diabetic neuropathy (250 60,357 2) (E11 40)   12  Dizziness (780 4) (R42)   13  Encounter for prostate cancer screening (V76 44) (Z12 5)   14  Encounter for screening for cardiovascular disorders (V81 2) (Z13 6)   15  Encounter for screening for malignant neoplasm of colon (V76 51) (Z12 11)   16  Encounter for screening for malignant neoplasm of prostate (V76 44) (Z12 5)   17  Hypercholesterolemia (272 0) (E78 00)   18  Hypokalemia (276 8) (E87 6)   19  Limb pain (729 5) (M79 609)   20  Medicare annual wellness visit, initial (V70 0) (Z00 00)   21  Minor depression (311) (F32 9)   22  Need for vaccination (V05 9) (Z23)   23  Never a smoker   24  Non-healing ulcer (707 9) (L98 499)   25  Obesity, Class I, BMI 30-34 9 (278 00) (E66 9)   26  Onychomycosis (110 1) (B35 1)   27  Open wound of foot, left, subsequent encounter (V58 89,892 0) (S91 302D)   28  Pain in both feet (729 5) (M79 671,M79 672)   29  Primary dysthymia (300 4) (F34 1)   30  Screening for hypothyroidism (V77 0) (Z13 29)   31  Wound of skin (782 9) (R23 8)     Past Medical History   · History of Acute upper respiratory infection (465 9) (J06 9)   · History of Ascending cholangitis (576 1) (K83 0)   · History of Atherosclerosis of arteries of extremities (440 20) (I70 209)   · History of Bug bite with infection (919 5,E906 4) (W57 XXXA)   · History of Callus (700) (L84)   · History of Need for vaccination (V05 9) (Z23)   · Screening for genitourinary condition (V81 6) (Z13 89)   · History of Screening for genitourinary condition (V81 6) (Z13 89)     The active problems and past medical history were reviewed and updated today       Surgical History      · History of Appendectomy   · History of Cholecystectomy   · History of Open Treatment Of Fracture Of The Radial Shaft   · History of Tonsillectomy     The surgical history was reviewed and updated today        Family History  Mother    · Family history of Heart disease   · Family history of HTN (hypertension)   · Family history of Liver cancer   · Family history of Melanoma  Father    · Family history of    · Family history of Gastric ulcer  Brother    · Family history of Diabetes  Grandparent    · Family history of Diabetes     Social History      · Never a smoker  The social history was reviewed and updated today     The medication list was reviewed and updated today       Allergies  1  No Known Drug Allergies         Physical Exam  Left Foot: Appearance: Normal except as noted: excessive pronation-- and-- pes planus  Great toe deformities include a bunion  Tenderness: None except the great toe  Right Foot: Appearance: Normal except as noted: excessive pronation-- and-- pes planus  Great toe deformities include a bunion  Tenderness: None except the great toe  Left Ankle: ROM: limited ROM in all planes Motor: diffuse weakness  Right Ankle: ROM: limited ROM in all planes Motor: diffuse weakness  Neurological Exam: performed  Light touch was decreased bilaterally  Vibratory sensation was decreased in both first metatarsophalangeal joints  Vascular Exam: performed Dorsalis pedis pulses were One/4 bilaterally  Posterior tibial pulses were One/4 bilaterally  Elevation Pallor: present bilaterally  Capillary refill time was greater than 3 seconds bilaterally-- and-- Q 9, findings bilateral  Negative digital hair  Edema: mild bilaterally-- and-- No Homans sign  Toenails: All of the toenails were elongated,-- hypertrophied,-- discolored,-- shown to have subungual debris-- and-- Mycotic     Socks and shoes removed, Right Foot Findings: erythematous and dry  Diminished tactile sensation with monofilament testing throughout the right foot   Socks and shoes removed, Left Foot Findings: erythematous and dry   Diminished tactile sensation with monofilament testing throughout the left foot  Capillary refills findings on the right were delayed in the toes   Pulses:  1+ in the posterior tibialis on the right  1+ in the dorsalis pedis on the right   Capillary refills findings on the left were delayed in the toes   Pulses:  1+ in the posterior tibialis on the left  1+ in the dorsalis pedis on the left   Assign Risk Category: 2: Loss of protective sensation with or without weakness, deformity, callus, pre-ulcer, or history of ulceration  High risk  Hyperkeratosis: present on both first toes,-- present on both first sub metatarsals-- and-- Pinch callus, bilateral    Shoe Gear Evaluation: performed ()  Right Foot: width: E -- and-- length: 12  Left Foot: width: E -- and-- length: 12  Recommendation(s): custom inlays      Patient's shoes and socks removed  Right Foot/Ankle   Right Foot Inspection  Skin Exam: callus and callus               Toe Exam: swelling and erythema  Sensory   Vibration: diminished  Proprioception: diminished      Vascular  Capillary refills: elevated  The right DP pulse is 1+  The right PT pulse is 1+       Left Foot/Ankle  Left Foot Inspection  Skin Exam: callus              Toe Exam: swelling and erythema                   Sensory   Vibration: diminished  Proprioception: diminished     Vascular  Capillary refills: elevated  The left DP pulse is 1+  The left PT pulse is 1+        Patient's shoes and socks removed          Assign Risk Category  Deformity present  Loss of protective sensation  Weak pulses  Risk: 2

## 2022-01-25 LAB
ALBUMIN SERPL-MCNC: 4.6 G/DL (ref 3.6–4.6)
ALBUMIN/CREAT UR: 108 MG/G CREAT (ref 0–29)
ALBUMIN/GLOB SERPL: 1.9 {RATIO} (ref 1.2–2.2)
ALP SERPL-CCNC: 65 IU/L (ref 44–121)
ALT SERPL-CCNC: 20 IU/L (ref 0–44)
AST SERPL-CCNC: 20 IU/L (ref 0–40)
BILIRUB SERPL-MCNC: 0.6 MG/DL (ref 0–1.2)
BUN SERPL-MCNC: 19 MG/DL (ref 8–27)
BUN/CREAT SERPL: 13 (ref 10–24)
CALCIUM SERPL-MCNC: 9.3 MG/DL (ref 8.6–10.2)
CHLORIDE SERPL-SCNC: 103 MMOL/L (ref 96–106)
CHOLEST SERPL-MCNC: 159 MG/DL (ref 100–199)
CO2 SERPL-SCNC: 22 MMOL/L (ref 20–29)
CREAT SERPL-MCNC: 1.46 MG/DL (ref 0.76–1.27)
CREAT UR-MCNC: 92.7 MG/DL
GLOBULIN SER-MCNC: 2.4 G/DL (ref 1.5–4.5)
GLUCOSE SERPL-MCNC: 158 MG/DL (ref 65–99)
HBA1C MFR BLD: 7.3 % (ref 4.8–5.6)
HDLC SERPL-MCNC: 45 MG/DL
LDLC SERPL CALC-MCNC: 71 MG/DL (ref 0–99)
MICROALBUMIN UR-MCNC: 100.1 UG/ML
MICRODELETION SYND BLD/T FISH: NORMAL
MICRODELETION SYND BLD/T FISH: NORMAL
POTASSIUM SERPL-SCNC: 3.9 MMOL/L (ref 3.5–5.2)
PROT SERPL-MCNC: 7 G/DL (ref 6–8.5)
SL AMB EGFR AFRICAN AMERICAN: 50 ML/MIN/1.73
SL AMB EGFR NON AFRICAN AMERICAN: 43 ML/MIN/1.73
SODIUM SERPL-SCNC: 141 MMOL/L (ref 134–144)
TRIGL SERPL-MCNC: 266 MG/DL (ref 0–149)

## 2022-01-26 ENCOUNTER — TELEPHONE (OUTPATIENT)
Dept: WOUND CARE | Facility: HOSPITAL | Age: 87
End: 2022-01-26

## 2022-01-27 ENCOUNTER — OFFICE VISIT (OUTPATIENT)
Dept: NEPHROLOGY | Facility: CLINIC | Age: 87
End: 2022-01-27
Payer: COMMERCIAL

## 2022-01-27 ENCOUNTER — RA CDI HCC (OUTPATIENT)
Dept: OTHER | Facility: HOSPITAL | Age: 87
End: 2022-01-27

## 2022-01-27 VITALS
HEIGHT: 71 IN | BODY MASS INDEX: 31.75 KG/M2 | WEIGHT: 226.8 LBS | SYSTOLIC BLOOD PRESSURE: 146 MMHG | DIASTOLIC BLOOD PRESSURE: 70 MMHG

## 2022-01-27 DIAGNOSIS — N18.30 BENIGN HYPERTENSION WITH CKD (CHRONIC KIDNEY DISEASE) STAGE III (HCC): Primary | ICD-10-CM

## 2022-01-27 DIAGNOSIS — N18.31 STAGE 3A CHRONIC KIDNEY DISEASE (HCC): ICD-10-CM

## 2022-01-27 DIAGNOSIS — I12.9 BENIGN HYPERTENSION WITH CKD (CHRONIC KIDNEY DISEASE) STAGE III (HCC): Primary | ICD-10-CM

## 2022-01-27 DIAGNOSIS — R80.9 MICROALBUMINURIA: ICD-10-CM

## 2022-01-27 PROCEDURE — 1160F RVW MEDS BY RX/DR IN RCRD: CPT | Performed by: INTERNAL MEDICINE

## 2022-01-27 PROCEDURE — 1036F TOBACCO NON-USER: CPT | Performed by: INTERNAL MEDICINE

## 2022-01-27 PROCEDURE — 99214 OFFICE O/P EST MOD 30 MIN: CPT | Performed by: INTERNAL MEDICINE

## 2022-01-27 NOTE — PROGRESS NOTES
NEPHROLOGY OUTPATIENT PROGRESS NOTE   Evans Spatz 81 y o  male MRN: [id number]  DATE: 1/27/2022  Reason for visit:   Chief Complaint   Patient presents with    Follow-up     CKD3a, HTN     ASSESSMENT and PLAN:  CKD stage IIIA, baseline creatinine 1 3 to 1 4 going back to 2018, 1 2 going back to 2016  -last creatinine 1 4 in January 2022 stable  -CKD suspect in the setting of long-term hypertension causing hypertensive arteriosclerosis, diabetes, age-related nephron loss  -UA bland in May 2021    -if creatinine worsens, consider renal ultrasound in the future  -avoid nephrotoxins or NSAIDs  -advised to drink more free water to stay hydrated    Microalbuminuria, last urine microalbumin/creatinine ratio 108 mg in early 2022    -this could be in the setting of underlying hypertension, diabetes  -currently remains on benazepril 20 mg daily  May consider increasing to 30 mg daily if worsening/persistent proteinuria  -last hemoglobin A1c 7 3 in early 2022     Hypertension  -blood pressure above goal in the office today although improving on repeat check   -Currently remains on amlodipine/benazepril 10/20 mg daily, Coreg 20 mg daily   -given slightly above goal BP, consider increasing benazepril and add additional 10 mg daily along with current amlodipine/benazepril combination  -patient will be seeing PCP in next few days and will be getting his BP checked again and if remains higher, will discuss with PCP to increase benazepril   -he does have BP machine at home although does not check it as not working/not accurate  -salt restricted diet recommended  -Advised him to bring BP machine during next office visit  Advised to check BP at home and call back if remains persistently greater than 135/85  -patient will be going for cataract surgery in 10 days    Discussed that he may hold amlodipine/benazepril on the day of surgery      Diabetes, last hemoglobin A1c overall improving 7 3     Diagnoses and all orders for this visit: Benign hypertension with CKD (chronic kidney disease) stage III (HCC)  -     Basic metabolic panel; Future  -     CBC; Future  -     Protein / creatinine ratio, urine; Future  -     PTH, intact; Future  -     Phosphorus; Future  -     Magnesium; Future    Stage 3a chronic kidney disease (HCC)  -     Basic metabolic panel; Future  -     CBC; Future  -     Protein / creatinine ratio, urine; Future  -     PTH, intact; Future  -     Phosphorus; Future  -     Magnesium; Future          SUBJECTIVE / HPI:  Hernan Leyva is a 81y o  year old male with medical issues of hypertension for 25 years, diabetes for 10 years, hyperlipidemia, CKD stage 3 with baseline creatinine 1 3 to 1 4 since 2018, prior 1 2 going back to 2016 who presents for regular follow-up of CKD  Last serum creatinine stable at baseline  He overall feels well since last visit  Does not check BP at home as he believes his BP machine is not working well  Patient's initial baseline creatinine 1 2 going back to 2016 although since 2018 seems to be around 1 3 to 1 4  His BP improving on repeat check in the office today  He is scheduled for upcoming cataract surgery in 10 days  Denies any urinary complaint  No recent NSAID exposure  Denies chest pain, shortness of breath, nausea, vomiting  REVIEW OF SYSTEMS:  More than 10 point review of systems were obtained and discussed in length with the patient  Complete review of systems were negative / unremarkable except mentioned above  PHYSICAL EXAM:  Vitals:    01/27/22 1056 01/27/22 1113 01/27/22 1120   BP: 158/82 152/68 146/70   BP Location: Left arm     Patient Position: Sitting     Cuff Size: Adult     Weight: 103 kg (226 lb 12 8 oz)     Height: 5' 11" (1 803 m)       Body mass index is 31 63 kg/m²  Physical Exam  Vitals reviewed  Constitutional:       Appearance: He is well-developed  HENT:      Head: Normocephalic and atraumatic        Right Ear: External ear normal       Left Ear: External ear normal    Eyes:      General: No scleral icterus  Conjunctiva/sclera: Conjunctivae normal    Cardiovascular:      Comments: S1, S2 present  Pulmonary:      Effort: Pulmonary effort is normal  No respiratory distress  Breath sounds: Normal breath sounds  No wheezing or rales  Abdominal:      General: Bowel sounds are normal  There is no distension  Palpations: Abdomen is soft  Tenderness: There is no abdominal tenderness  Musculoskeletal:         General: No deformity  Right lower leg: No edema  Left lower leg: No edema  Lymphadenopathy:      Cervical: No cervical adenopathy  Skin:     Findings: No rash  Neurological:      Mental Status: He is alert and oriented to person, place, and time  Psychiatric:         Behavior: Behavior normal          PAST MEDICAL HISTORY:  Past Medical History:   Diagnosis Date    Ascending cholangitis     Last assessed 1/22/2015     Atherosclerosis of arteries of extremities ([facility] [address] )     Resolved 12/19/2016        PAST SURGICAL HISTORY:  Past Surgical History:   Procedure Laterality Date    APPENDECTOMY      Last assessed 1/22/2015     CHOLECYSTECTOMY      Last assessed 1/22/2015    Rene Espinal FRACTURE SURGERY      Open treatment of fracture of the radial shaft   Last assessed 1/22/2015     TONSILLECTOMY      Last assessed 1/22/2015        SOCIAL HISTORY:  Social History     Substance and Sexual Activity   Alcohol Use Yes     Social History     Substance and Sexual Activity   Drug Use Never     Social History     Tobacco Use   Smoking Status Never Smoker   Smokeless Tobacco Never Used       FAMILY HISTORY:  Family History   Problem Relation Age of Onset    Hypertension Father     Other Father         Gastric ulcer     Heart disease Mother     Hypertension Mother     Liver cancer Mother     Melanoma Mother     Diabetes Brother     Diabetes Family     Diabetes Maternal Grandfather     Mental illness Neg Hx MEDICATIONS:    Current Outpatient Medications:     amLODIPine-benazepril (LOTREL) 10-20 MG per capsule, TAKE 1 CAPSULE BY MOUTH  DAILY, Disp: 90 capsule, Rfl: 3    Bioflavonoid Products (ZULEYKA-C) 500-200-60 MG TABS, Take by mouth, Disp: , Rfl:     carvedilol (COREG CR) 20 MG 24 hr capsule, TAKE 1 CAPSULE BY MOUTH  DAILY, Disp: 90 capsule, Rfl: 1    Cinnamon 500 MG TABS, Take by mouth, Disp: , Rfl:     clotrimazole-betamethasone (LOTRISONE) 1-0 05 % cream, APPLY TO AFFECTED AREA TWICE A DAY, Disp: 30 g, Rfl: 0    [employer] Ind[employer]door Allergy Rlf 10 MG tablet, TAKE 1 TABLET BY MOUTH EVERY DAY IN THE MORNING, Disp: 90 tablet, Rfl: 1    fluticasone (FLONASE) 50 mcg/act nasal spray, into each nostril, Disp: , Rfl:     glipiZIDE (GLUCOTROL XL) 2 5 mg 24 hr tablet, TAKE 1 TABLET BY MOUTH  DAILY, Disp: 90 tablet, Rfl: 3    glucose blood (FREESTYLE LITE) test strip, by In Vitro route, Disp: , Rfl:     hydrocortisone 1 % cream, Apply topically 4 (four) times a day as needed for irritation, Disp: 120 g, Rfl: 0    Januvia 50 MG tablet, TAKE 1 TABLET BY MOUTH  DAILY, Disp: 90 tablet, Rfl: 3    metFORMIN (GLUCOPHAGE-XR) 500 mg 24 hr tablet, TAKE 1 TABLET BY MOUTH  DAILY WITH DINNER, Disp: 90 tablet, Rfl: 3    montelukast (SINGULAIR) 10 mg tablet, TAKE 1 TABLET BY MOUTH DAILY AT BEDTIME, Disp: 90 tablet, Rfl: 1    Multiple Vitamins-Iron (DAILY MULTIPLE VITAMIN/IRON PO), Take by mouth, Disp: , Rfl:     olopatadine (PATANOL) 0 1 % ophthalmic solution, Administer 1 drop to both eyes 2 (two) times a day, Disp: 5 mL, Rfl: 3    Omega-3 Fatty Acids (FISH OIL) 1000 MG CPDR, Take by mouth, Disp: , Rfl:     simvastatin (ZOCOR) 10 mg tablet, TAKE 1 TABLET BY MOUTH  DAILY AT BEDTIME, Disp: 90 tablet, Rfl: 3    Viibryd 20 MG tablet, TAKE 1 TABLET BY MOUTH  DAILY, Disp: 90 tablet, Rfl: 1    Lab Results:   Results for orders placed or performed in visit on 01/24/22   Comprehensive metabolic panel   Result Value Ref Range Glucose, Random 158 (H) 65 - 99 mg/dL    BUN 19 8 - 27 mg/dL    Creatinine 1 46 (H) 0 76 - 1 27 mg/dL    eGFR Non  43 (L) >59 mL/min/1 73    eGFR  50 (L) >59 mL/min/1 73    SL AMB BUN/CREATININE RATIO 13 10 - 24    Sodium 141 134 - 144 mmol/L    Potassium 3 9 3 5 - 5 2 mmol/L    Chloride 103 96 - 106 mmol/L    CO2 22 20 - 29 mmol/L    CALCIUM 9 3 8 6 - 10 2 mg/dL    Protein, Total 7 0 6 0 - 8 5 g/dL    Albumin 4 6 3 6 - 4 6 g/dL    Globulin, Total 2 4 1 5 - 4 5 g/dL    Albumin/Globulin Ratio 1 9 1 2 - 2 2    TOTAL BILIRUBIN 0 6 0 0 - 1 2 mg/dL    Alk Phos Isoenzymes 65 44 - 121 IU/L    AST 20 0 - 40 IU/L    ALT 20 0 - 44 IU/L   Lipid panel   Result Value Ref Range    Cholesterol, Total 159 100 - 199 mg/dL    Triglycerides 266 (H) 0 - 149 mg/dL    HDL 45 >39 mg/dL    LDL Calculated 71 0 - 99 mg/dL   Microalbumin / creatinine urine ratio   Result Value Ref Range    Creatinine, Urine 92 7 Not Estab  mg/dL    Microalbum  ,U,Random 100 1 Not Estab  ug/mL    Microalb/Creat Ratio 108 (H) 0 - 29 mg/g creat   [facility] Kidney Stone Panel   Result Value Ref Range    Interpretation Note    Hemoglobin A1c (w/out EAG)   Result Value Ref Range    Hemoglobin A1C 7 3 (H) 4 8 - 5 6 %   Cardiovascular Report   Result Value Ref Range    Interpretation Note

## 2022-01-27 NOTE — PROGRESS NOTES
[facility]  coding opportunities       Chart reviewed, no opportunity found: CHART REVIEWED, NO OPPORTUNITY FOUND                        Patients insurance company: [facility] [address]  (Medicare Advantage and Commercial)

## 2022-02-01 ENCOUNTER — OFFICE VISIT (OUTPATIENT)
Dept: FAMILY MEDICINE CLINIC | Facility: CLINIC | Age: 87
End: 2022-02-01
Payer: COMMERCIAL

## 2022-02-01 VITALS
DIASTOLIC BLOOD PRESSURE: 78 MMHG | OXYGEN SATURATION: 98 % | HEIGHT: 71 IN | RESPIRATION RATE: 20 BRPM | SYSTOLIC BLOOD PRESSURE: 120 MMHG | TEMPERATURE: 97.5 F | HEART RATE: 76 BPM | BODY MASS INDEX: 31.5 KG/M2 | WEIGHT: 225 LBS

## 2022-02-01 DIAGNOSIS — N18.30 BENIGN HYPERTENSION WITH CKD (CHRONIC KIDNEY DISEASE) STAGE III (HCC): ICD-10-CM

## 2022-02-01 DIAGNOSIS — I12.9 BENIGN HYPERTENSION WITH CKD (CHRONIC KIDNEY DISEASE) STAGE III (HCC): ICD-10-CM

## 2022-02-01 DIAGNOSIS — I10 BENIGN ESSENTIAL HYPERTENSION: ICD-10-CM

## 2022-02-01 DIAGNOSIS — B35.4 DERMATOPHYTOSIS OF BODY: ICD-10-CM

## 2022-02-01 DIAGNOSIS — E11.42 DIABETIC POLYNEUROPATHY ASSOCIATED WITH TYPE 2 DIABETES MELLITUS (HCC): Primary | ICD-10-CM

## 2022-02-01 DIAGNOSIS — E78.2 MIXED HYPERLIPIDEMIA: ICD-10-CM

## 2022-02-01 PROCEDURE — 1160F RVW MEDS BY RX/DR IN RCRD: CPT | Performed by: FAMILY MEDICINE

## 2022-02-01 PROCEDURE — 99214 OFFICE O/P EST MOD 30 MIN: CPT | Performed by: FAMILY MEDICINE

## 2022-02-01 PROCEDURE — 3078F DIAST BP <80 MM HG: CPT | Performed by: FAMILY MEDICINE

## 2022-02-01 PROCEDURE — 3074F SYST BP LT 130 MM HG: CPT | Performed by: FAMILY MEDICINE

## 2022-02-01 PROCEDURE — 1036F TOBACCO NON-USER: CPT | Performed by: FAMILY MEDICINE

## 2022-02-01 RX ORDER — PREDNISOLONE ACETATE 10 MG/ML
SUSPENSION/ DROPS OPHTHALMIC
COMMUNITY
Start: 2022-01-25 | End: 2022-02-07 | Stop reason: HOSPADM

## 2022-02-01 RX ORDER — CLOTRIMAZOLE AND BETAMETHASONE DIPROPIONATE 10; .64 MG/G; MG/G
1 CREAM TOPICAL 2 TIMES DAILY
Qty: 45 G | Refills: 5 | Status: SHIPPED | OUTPATIENT
Start: 2022-02-01

## 2022-02-01 RX ORDER — GATIFLOXACIN 5 MG/ML
SOLUTION/ DROPS OPHTHALMIC
COMMUNITY
Start: 2022-01-25

## 2022-02-01 RX ORDER — AMLODIPINE BESYLATE AND BENAZEPRIL HYDROCHLORIDE 10; 40 MG/1; MG/1
1 CAPSULE ORAL DAILY
Qty: 90 CAPSULE | Refills: 1 | Status: CANCELLED | OUTPATIENT
Start: 2022-02-01

## 2022-02-01 RX ORDER — KETOROLAC TROMETHAMINE 5 MG/ML
SOLUTION OPHTHALMIC
COMMUNITY
Start: 2022-01-25

## 2022-02-01 NOTE — PROGRESS NOTES
Assessment/Plan:    1  Diabetic polyneuropathy associated with type 2 diabetes mellitus (HCC)  -     Hemoglobin A1C; Future; Expected date: 06/01/2022  -     Comprehensive metabolic panel; Future; Expected date: 06/01/2022    2  Benign hypertension with CKD (chronic kidney disease) stage III ([facility] [address] )    3  Mixed hyperlipidemia    4  Benign essential hypertension    5  Dermatophytosis of body  -     clotrimazole-betamethasone (LOTRISONE) 1-0 05 % cream; Apply 1 application topically 2 (two) times a day To affected area            There are no Patient Instructions on file for this visit  Return in about 4 months (around 6/1/2022) for Recheck  Subjective:      Patient ID: Rose Rubinstein is a 81 y o  male  Chief Complaint   Patient presents with    Follow-up     on labs, Kimberlyn Maldonado        Pt is here to follow up DM  Pt feels well  No Cp, no SOB      The following portions of the patient's history were reviewed and updated as appropriate: allergies, current medications, past family history, past medical history, past social history, past surgical history and problem list     Review of Systems   Constitutional: Negative for activity change, appetite change, chills, diaphoresis, fatigue, fever and unexpected weight change  HENT: Negative for congestion, dental problem, ear pain, mouth sores, sinus pressure, sinus pain, sore throat and trouble swallowing  Eyes: Negative for photophobia, discharge and itching  Respiratory: Negative for apnea, chest tightness and shortness of breath  Cardiovascular: Negative for chest pain, palpitations and leg swelling  Gastrointestinal: Negative for abdominal distention, abdominal pain, blood in stool, nausea and vomiting  Endocrine: Negative for cold intolerance, heat intolerance, polydipsia, polyphagia and polyuria  Genitourinary: Negative for difficulty urinating  Musculoskeletal: Negative for arthralgias  Skin: Negative for color change and wound  Neurological: Negative for dizziness, syncope, speech difficulty and headaches  Hematological: Negative for adenopathy  Psychiatric/Behavioral: Negative for agitation and behavioral problems  Current Outpatient Medications   Medication Sig Dispense Refill    amLODIPine-benazepril (LOTREL) 10-20 MG per capsule TAKE 1 CAPSULE BY MOUTH  DAILY (Patient taking differently: Take 1 capsule by mouth daily at bedtime  ) 90 capsule 3    Bioflavonoid Products (ZULEYKA-C) 500-200-60 MG TABS Take by mouth      carvedilol (COREG CR) 20 MG 24 hr capsule TAKE 1 CAPSULE BY MOUTH  DAILY 90 capsule 1    Cinnamon 500 MG TABS Take by mouth      clotrimazole-betamethasone (LOTRISONE) 1-0 05 % cream Apply 1 application topically 2 (two) times a day To affected area 45 g 5    fluticasone (FLONASE) 50 mcg/act nasal spray into each nostril      gatifloxacin (ZYMAXID) 0 5 % INSTILL 1 DROP INTO RIGHT EYE TWICE A DAY      glipiZIDE (GLUCOTROL XL) 2 5 mg 24 hr tablet TAKE 1 TABLET BY MOUTH  DAILY 90 tablet 3    glucose blood (FREESTYLE LITE) test strip by In Vitro route      hydrocortisone 1 % cream Apply topically 4 (four) times a day as needed for irritation 120 g 0    Januvia 50 MG tablet TAKE 1 TABLET BY MOUTH  DAILY 90 tablet 3    ketorolac (ACULAR) 0 5 % ophthalmic solution INSTILL 1 DROP INTO RIGHT EYE 4 TIMES A DAY      metFORMIN (GLUCOPHAGE-XR) 500 mg 24 hr tablet TAKE 1 TABLET BY MOUTH  DAILY WITH DINNER 90 tablet 3    Multiple Vitamins-Iron (DAILY MULTIPLE VITAMIN/IRON PO) Take by mouth      Omega-3 Fatty Acids (FISH OIL) 1000 MG CPDR Take by mouth      prednisoLONE acetate (PRED FORTE) 1 % ophthalmic suspension INSTILL 1 DROP INTO RIGHT EYE 4 TIMES A DAY      simvastatin (ZOCOR) 10 mg tablet TAKE 1 TABLET BY MOUTH  DAILY AT BEDTIME 90 tablet 3    Viibryd 20 MG tablet TAKE 1 TABLET BY MOUTH  DAILY 90 tablet 1     No current facility-administered medications for this visit         Objective:    /78 Comment: regular  Pulse 76   Temp 97 5 °F (36 4 °C)   Resp 20   Ht 5' 11" (1 803 m)   Wt 102 kg (225 lb)   SpO2 98%   BMI 31 38 kg/m²        Physical Exam  Vitals and nursing note reviewed  Constitutional:       General: He is not in acute distress  Appearance: He is well-developed  He is not diaphoretic  HENT:      Head: Normocephalic and atraumatic  Right Ear: External ear normal       Left Ear: External ear normal       Nose: Nose normal       Mouth/Throat:      Pharynx: No oropharyngeal exudate  Eyes:      General: No scleral icterus  Right eye: No discharge  Left eye: No discharge  Pupils: Pupils are equal, round, and reactive to light  Neck:      Thyroid: No thyromegaly  Cardiovascular:      Rate and Rhythm: Normal rate  Heart sounds: Normal heart sounds  No murmur heard  Pulmonary:      Effort: Pulmonary effort is normal  No respiratory distress  Breath sounds: Normal breath sounds  No wheezing  Abdominal:      General: Bowel sounds are normal  There is no distension  Palpations: Abdomen is soft  There is no mass  Tenderness: There is no abdominal tenderness  There is no guarding or rebound  Musculoskeletal:         General: Normal range of motion  Skin:     General: Skin is warm and dry  Findings: No erythema or rash  Neurological:      Mental Status: He is alert        Coordination: Coordination normal       Deep Tendon Reflexes: Reflexes normal    Psychiatric:         Behavior: Behavior normal               Recent Results (from the past 672 hour(s))   Comprehensive metabolic panel    Collection Time: 01/24/22  8:43 AM   Result Value Ref Range    Glucose, Random 158 (H) 65 - 99 mg/dL    BUN 19 8 - 27 mg/dL    Creatinine 1 46 (H) 0 76 - 1 27 mg/dL    eGFR Non  43 (L) >59 mL/min/1 73    eGFR  50 (L) >59 mL/min/1 73    SL AMB BUN/CREATININE RATIO 13 10 - 24    Sodium 141 134 - 144 mmol/L Potassium 3 9 3 5 - 5 2 mmol/L    Chloride 103 96 - 106 mmol/L    CO2 22 20 - 29 mmol/L    CALCIUM 9 3 8 6 - 10 2 mg/dL    Protein, Total 7 0 6 0 - 8 5 g/dL    Albumin 4 6 3 6 - 4 6 g/dL    Globulin, Total 2 4 1 5 - 4 5 g/dL    Albumin/Globulin Ratio 1 9 1 2 - 2 2    TOTAL BILIRUBIN 0 6 0 0 - 1 2 mg/dL    Alk Phos Isoenzymes 65 44 - 121 IU/L    AST 20 0 - 40 IU/L    ALT 20 0 - 44 IU/L   Lipid panel    Collection Time: 01/24/22  8:43 AM   Result Value Ref Range    Cholesterol, Total 159 100 - 199 mg/dL    Triglycerides 266 (H) 0 - 149 mg/dL    HDL 45 >39 mg/dL    LDL Calculated 71 0 - 99 mg/dL   Microalbumin / creatinine urine ratio    Collection Time: 01/24/22  8:43 AM   Result Value Ref Range    Creatinine, Urine 92 7 Not Estab  mg/dL    Microalbum  ,U,Random 100 1 Not Estab  ug/mL    Microalb/Creat Ratio 108 (H) 0 - 29 mg/g creat   [facility] Kidney Stone Panel    Collection Time: 01/24/22  8:43 AM   Result Value Ref Range    Interpretation Note    Hemoglobin A1c (w/out EAG)    Collection Time: 01/24/22  8:43 AM   Result Value Ref Range    Hemoglobin A1C 7 3 (H) 4 8 - 5 6 %   Cardiovascular Report    Collection Time: 01/24/22  8:43 AM   Result Value Ref Range    Interpretation Note          Ria Hall,

## 2022-02-01 NOTE — PRE-PROCEDURE INSTRUCTIONS
Pre-Surgery Instructions:   Medication Instructions    amLODIPine-benazepril (LOTREL) 10-20 MG per capsule Patient was instructed by Physician and understands   Bioflavonoid Products (ZULEYKA-C) 660-379-46 MG TABS Patient was instructed by Physician and understands   carvedilol (COREG CR) 20 MG 24 hr capsule Instructed patient per Anesthesia Guidelines   Cinnamon 500 MG TABS Patient was instructed by Physician and understands   clotrimazole-betamethasone (LOTRISONE) 1-0 05 % cream Patient was instructed by Physician and understands   fluticasone (FLONASE) 50 mcg/act nasal spray Patient was instructed by Physician and understands   glipiZIDE (GLUCOTROL XL) 2 5 mg 24 hr tablet Patient was instructed by Physician and understands   glucose blood (FREESTYLE LITE) test strip Patient was instructed by Physician and understands   hydrocortisone 1 % cream Patient was instructed by Physician and understands   Januvia 50 MG tablet Patient was instructed by Physician and understands   metFORMIN (GLUCOPHAGE-XR) 500 mg 24 hr tablet Patient was instructed by Physician and understands   Multiple Vitamins-Iron (DAILY MULTIPLE VITAMIN/IRON PO) Patient was instructed by Physician and understands   Omega-3 Fatty Acids (FISH OIL) 1000 MG CPDR Patient was instructed by Physician and understands   simvastatin (ZOCOR) 10 mg tablet Patient was instructed by Physician and understands   Viibryd 20 MG tablet Patient was instructed by Physician and understands  Pre op instructions reviewed with pt and wife Yudith Ochoa, also  [phone], via phone  Pt to take coreg a m of surgery  My Surgical Experience    The following information was developed to assist you to prepare for your operation  What do I need to do before coming to the hospital?   Arrange for a responsible person to drive you to and from the hospital    Arrange care for your children at home   Children are not allowed in the recovery areas of the hospital   Plan to wear clothing that is easy to put on and take off  If you are having shoulder surgery, wear a shirt that buttons or zippers in the front  Bathing  o Shower the evening before and the morning of your surgery with an antibacterial soap  Please refer to the Pre Op Showering Instructions for Surgery Patients Sheet   o Remove nail polish and all body piercing jewelry  o Do not shave any body part for at least 24 hours before surgery-this includes face, arms, legs and upper body  Food  o Nothing to eat or drink after midnight the night before your surgery  This includes candy and chewing gum  o Exception: If your surgery is after 12:00pm (noon), you may have clear liquids such as 7-Up®, ginger ale, apple or cranberry juice, Jell-O®, water, or clear broth until 8:00 am  o Do not drink milk or juice with pulp on the morning before surgery  o Do not drink alcohol 24 hours before surgery  Medicine  o Follow instructions you received from your surgeon about which medicines you may take on the day of surgery  o If instructed to take medicine on the morning of surgery, take pills with just a small sip of water  Call your prescribing doctor for specific information on what to do if you take insulin    What should I bring to the hospital?    Bring:  Regla Barthel or a walker, if you have them, for foot or knee surgery   A list of the daily medicines, vitamins, minerals, herbals and nutritional supplements you take   Include the dosages of medicines and the time you take them each day   Glasses, dentures or hearing aids   Minimal clothing; you will be wearing hospital sleepwear   Photo ID; required to verify your identity   If you have a Living Will or Power of , bring a copy of the documents   If you have an ostomy, bring an extra pouch and any supplies you use    Do not bring   Medicines or inhalers   Money, valuables or jewelry    What other information should I know about the day of surgery?  Notify your surgeons if you develop a cold, sore throat, cough, fever, rash or any other illness   Report to the Ambulatory Surgical/Same Day Surgery Unit   You will be instructed to stop at Registration only if you have not been pre-registered   Inform your  fi they do not stay that they will be asked by the staff to leave a phone number where they can be reached   Be available to be reached before surgery  In the event the operating room schedule changes, you may be asked to come in earlier or later than expected    *It is important to tell your doctor and others involved in your health care if you are taking or have been taking any non-prescription drugs, vitamins, minerals, herbals or other nutritional supplements   Any of these may interact with some food or medicines and cause a reaction

## 2022-02-07 ENCOUNTER — ANESTHESIA (OUTPATIENT)
Dept: PERIOP | Facility: AMBULARY SURGERY CENTER | Age: 87
End: 2022-02-07
Payer: COMMERCIAL

## 2022-02-07 ENCOUNTER — ANESTHESIA EVENT (OUTPATIENT)
Dept: PERIOP | Facility: AMBULARY SURGERY CENTER | Age: 87
End: 2022-02-07
Payer: COMMERCIAL

## 2022-02-07 ENCOUNTER — HOSPITAL ENCOUNTER (OUTPATIENT)
Facility: AMBULARY SURGERY CENTER | Age: 87
Setting detail: OUTPATIENT SURGERY
Discharge: HOME | End: 2022-02-07
Attending: OPHTHALMOLOGY | Admitting: OPHTHALMOLOGY
Payer: COMMERCIAL

## 2022-02-07 VITALS
SYSTOLIC BLOOD PRESSURE: 152 MMHG | HEIGHT: 71 IN | HEART RATE: 67 BPM | RESPIRATION RATE: 16 BRPM | OXYGEN SATURATION: 95 % | BODY MASS INDEX: 31.5 KG/M2 | WEIGHT: 225 LBS | DIASTOLIC BLOOD PRESSURE: 74 MMHG | TEMPERATURE: 97.8 F

## 2022-02-07 PROCEDURE — V2632 POST CHMBR INTRAOCULAR LENS: HCPCS | Performed by: OPHTHALMOLOGY

## 2022-02-07 DEVICE — ACRYSOF(R) IQ ASPHERIC NATURAL IOL, SINGLE-PIECE ACRYLIC FOLDABLE PCL, UV WITH BLUE LIGHTFILTER, 13.0MM LENGTH, 6.0MM ANTERIORASYMMETRIC BICONVEX OPTIC, PLANAR HAPTICS.
Type: IMPLANTABLE DEVICE | Status: FUNCTIONAL
Brand: ACRYSOF®

## 2022-02-07 RX ORDER — TETRACAINE HYDROCHLORIDE 5 MG/ML
1 SOLUTION OPHTHALMIC ONCE
Status: COMPLETED | OUTPATIENT
Start: 2022-02-07 | End: 2022-02-07

## 2022-02-07 RX ORDER — MIDAZOLAM HYDROCHLORIDE 2 MG/2ML
INJECTION, SOLUTION INTRAMUSCULAR; INTRAVENOUS AS NEEDED
Status: DISCONTINUED | OUTPATIENT
Start: 2022-02-07 | End: 2022-02-07

## 2022-02-07 RX ORDER — GATIFLOXACIN 5 MG/ML
SOLUTION/ DROPS OPHTHALMIC AS NEEDED
Status: DISCONTINUED | OUTPATIENT
Start: 2022-02-07 | End: 2022-02-07 | Stop reason: HOSPADM

## 2022-02-07 RX ORDER — LIDOCAINE HYDROCHLORIDE 10 MG/ML
INJECTION, SOLUTION EPIDURAL; INFILTRATION; INTRACAUDAL; PERINEURAL AS NEEDED
Status: DISCONTINUED | OUTPATIENT
Start: 2022-02-07 | End: 2022-02-07 | Stop reason: HOSPADM

## 2022-02-07 RX ORDER — KETOROLAC TROMETHAMINE 5 MG/ML
1 SOLUTION OPHTHALMIC
Status: COMPLETED | OUTPATIENT
Start: 2022-02-07 | End: 2022-02-07

## 2022-02-07 RX ORDER — LIDOCAINE HYDROCHLORIDE 20 MG/ML
1 JELLY TOPICAL
Status: COMPLETED | OUTPATIENT
Start: 2022-02-07 | End: 2022-02-07

## 2022-02-07 RX ORDER — PHENYLEPHRINE HCL 2.5 %
1 DROPS OPHTHALMIC (EYE)
Status: COMPLETED | OUTPATIENT
Start: 2022-02-07 | End: 2022-02-07

## 2022-02-07 RX ORDER — TETRACAINE HYDROCHLORIDE 5 MG/ML
SOLUTION OPHTHALMIC AS NEEDED
Status: DISCONTINUED | OUTPATIENT
Start: 2022-02-07 | End: 2022-02-07 | Stop reason: HOSPADM

## 2022-02-07 RX ORDER — CYCLOPENTOLATE HYDROCHLORIDE 10 MG/ML
1 SOLUTION/ DROPS OPHTHALMIC
Status: COMPLETED | OUTPATIENT
Start: 2022-02-07 | End: 2022-02-07

## 2022-02-07 RX ORDER — BALANCED SALT SOLUTION 6.4; .75; .48; .3; 3.9; 1.7 MG/ML; MG/ML; MG/ML; MG/ML; MG/ML; MG/ML
SOLUTION OPHTHALMIC AS NEEDED
Status: DISCONTINUED | OUTPATIENT
Start: 2022-02-07 | End: 2022-02-07 | Stop reason: HOSPADM

## 2022-02-07 RX ADMIN — TETRACAINE HYDROCHLORIDE 1 DROP: 5 SOLUTION OPHTHALMIC at 06:15

## 2022-02-07 RX ADMIN — CYCLOPENTOLATE HYDROCHLORIDE 1 DROP: 10 SOLUTION/ DROPS OPHTHALMIC at 06:45

## 2022-02-07 RX ADMIN — CYCLOPENTOLATE HYDROCHLORIDE 1 DROP: 10 SOLUTION/ DROPS OPHTHALMIC at 06:15

## 2022-02-07 RX ADMIN — LIDOCAINE HYDROCHLORIDE 1 APPLICATION: 20 JELLY TOPICAL at 06:30

## 2022-02-07 RX ADMIN — PHENYLEPHRINE HYDROCHLORIDE 1 DROP: 25 SOLUTION/ DROPS OPHTHALMIC at 06:15

## 2022-02-07 RX ADMIN — LIDOCAINE HYDROCHLORIDE 1 APPLICATION: 20 JELLY TOPICAL at 06:45

## 2022-02-07 RX ADMIN — KETOROLAC TROMETHAMINE 1 DROP: 5 SOLUTION OPHTHALMIC at 06:45

## 2022-02-07 RX ADMIN — CYCLOPENTOLATE HYDROCHLORIDE 1 DROP: 10 SOLUTION/ DROPS OPHTHALMIC at 06:30

## 2022-02-07 RX ADMIN — PHENYLEPHRINE HYDROCHLORIDE 1 DROP: 25 SOLUTION/ DROPS OPHTHALMIC at 06:30

## 2022-02-07 RX ADMIN — PHENYLEPHRINE HYDROCHLORIDE 1 DROP: 25 SOLUTION/ DROPS OPHTHALMIC at 06:59

## 2022-02-07 RX ADMIN — CYCLOPENTOLATE HYDROCHLORIDE 1 DROP: 10 SOLUTION/ DROPS OPHTHALMIC at 06:58

## 2022-02-07 RX ADMIN — KETOROLAC TROMETHAMINE 1 DROP: 5 SOLUTION OPHTHALMIC at 06:30

## 2022-02-07 RX ADMIN — LIDOCAINE HYDROCHLORIDE 1 APPLICATION: 20 JELLY TOPICAL at 06:15

## 2022-02-07 RX ADMIN — MIDAZOLAM 1 MG: 1 INJECTION INTRAMUSCULAR; INTRAVENOUS at 06:58

## 2022-02-07 RX ADMIN — KETOROLAC TROMETHAMINE 1 DROP: 5 SOLUTION OPHTHALMIC at 06:59

## 2022-02-07 RX ADMIN — MIDAZOLAM 1 MG: 1 INJECTION INTRAMUSCULAR; INTRAVENOUS at 07:05

## 2022-02-07 RX ADMIN — KETOROLAC TROMETHAMINE 1 DROP: 5 SOLUTION OPHTHALMIC at 06:15

## 2022-02-07 RX ADMIN — PHENYLEPHRINE HYDROCHLORIDE 1 DROP: 25 SOLUTION/ DROPS OPHTHALMIC at 06:45

## 2022-02-07 NOTE — OP NOTE
OPERATIVE REPORT    PATIENT NAME: Rose Rubinstein    :  1935  MRN: [id number]  Pt Location: [place] [address] OR ROOM 01    Surgery Date: 2022    Surgeon(s) and Role:     * Oc Phillips MD - Primary    Age-related nuclear cataract, right eye [H25 11]    Post-Op Diagnosis Codes:     * Age-related nuclear cataract, right eye [H25 11]    Procedure(s):  EXTRACTION EXTRACAPSULAR CATARACT PHACO INTRAOCULAR LENS (IOL)    Anesthesia Type:   IV Sedation with Anesthesia    Operative Indications:  Age-related nuclear cataract, right eye [H25 11]  Decreased vision to 20/60  With problems driving  Pt requested cataract sx the right eye    Procedure and Technique:    Procedure Details     The patient was brought in the OR in stable condition and placed on the operative table  The right eye was prepped and draped in the usual sterile manner  Attention was directed to the right eye where a lid speculum was placed  A 2 4 mm clear corneal incision was made temperally  1/2 cc of 1% MPF Lidocaine was irrigated into the anterior chamber followed by viscoat  The side port incision was placed superiorly  The capsularrhexis was made and the nucleus was hydrodissected with BSS  The nucleus was then removed with the phaco handpiece followed by removal of the cortical material with the I/A handpiece  The capsular bag was then filled with Provisc  The IOL was folded and placed in to the capsular bag and centered well  The remaining Provisc was removed from the eye with the I/A  The wounds were hydrated with BSS and found to be water tight  The lid speculum was removed and 2 drops of Gatifloxicin were placed over the cornea  A protective eye shield was taped over the eye and the patient went to PACU in stable condition  I will see the patient in the office tomorrow and the expected post op period is a few weeks         Complications: None        Disposition: PACU   Condition: Stable    SIGNATURE: Oc Phillips MD  DATE:  2022  TIME: 7:30 AM

## 2022-02-07 NOTE — ANESTHESIA POSTPROCEDURE EVALUATION
Post-Op Assessment Note    CV Status:  Stable    Pain management: adequate     Mental Status:  Alert and awake   Hydration Status:  Euvolemic   PONV Controlled:  Controlled   Airway Patency:  Patent      Post Op Vitals Reviewed: Yes            No complications documented      BP   138/80   Temp      Pulse  62   Resp   20   SpO2   99
[FreeTextEntry1] :  37 yo [place] gentleman seeing me for the first time  working as an aide at [employer], coming for f/u [facility], with + Microal/creat ratio, \par seeing me for the first time , sees Dr Cisneros\par        Regimen: \par        Lantus 24 units SC QHS\par        metformin ER 1000mg BID\par        Humalog only with bkfst 120 takes 6units\par 160 takes 1unit per 10 over 130\par dinner 8-16units same sliding scale \par  A1c tredning down \par Eye exam \par Foot exam - jan 2019 \par        Wearing Free Style lynda\par           no low Blood sugars now \par        HTN at goal on Ramipril. ASA 81mg PO Qdialy. \par        CAD/CVA denies \par        Lipids 69 on Crestor \par        thyroid US 9/15 no nodules \par

## 2022-02-07 NOTE — ANESTHESIA PREPROCEDURE EVALUATION
Procedure:  EXTRACTION EXTRACAPSULAR CATARACT PHACO INTRAOCULAR LENS (IOL) (Right Eye)    Relevant Problems   CARDIO   (+) Mixed hyperlipidemia      /RENAL   (+) Benign hypertension with CKD (chronic kidney disease) stage III (HCC)   (+) Stage 3a chronic kidney disease (HCC)      NEURO/PSYCH   (+) Diabetic polyneuropathy associated with type 2 diabetes mellitus (HCC)   (+) Minor depression        Physical Exam    Airway    Mallampati score: II  TM Distance: >3 FB  Neck ROM: full     Dental   No notable dental hx     Cardiovascular  Cardiovascular exam normal    Pulmonary  Pulmonary exam normal     Other Findings        Anesthesia Plan  ASA Score- 2     Anesthesia Type- IV sedation with anesthesia with ASA Monitors  Additional Monitors:   Airway Plan:           Plan Factors-Exercise tolerance (METS): >4 METS  Chart reviewed  Imaging results reviewed  Existing labs reviewed  Patient summary reviewed  Patient is not a current smoker  Induction-     Postoperative Plan-     Informed Consent- Anesthetic plan and risks discussed with patient  I personally reviewed this patient with the CRNA  Discussed and agreed on the Anesthesia Plan with the CRNA  Ebonie Cox

## 2022-02-07 NOTE — DISCHARGE INSTRUCTIONS
Dr Concepcion Vang Cataract Instructions    Activity:     1  No Driving until instructed   2  Keep shield on until seen tomorrow except when administering drops   3  No heavy lifting over 30 lbs   4  No water in eye for 1 week     Diet:     1  Resume normal diet    Normal Symptoms:     1  Mild Headache   2  Scratchy or picky feeling around eye    Call the office if:     1  You have any questions or concerns   2  If eye pain is not relieved by extra strength tylenol    Office phone number:  [phone]      Next appointment:     1  See Dr Concepcion Vang at his office tomorrow as scheduled   __________10:45am________________________________________________   2  Bring blue eye kit with you and eyedrops to the office    A new set of comprehensive instructions will be given and reviewed with you during your office visit tomorrow

## 2022-05-06 ENCOUNTER — OFFICE VISIT (OUTPATIENT)
Dept: PODIATRY | Facility: CLINIC | Age: 87
End: 2022-05-06
Payer: COMMERCIAL

## 2022-05-06 VITALS — HEIGHT: 71 IN | BODY MASS INDEX: 31.5 KG/M2 | WEIGHT: 225 LBS

## 2022-05-06 DIAGNOSIS — M79.671 PAIN IN BOTH FEET: ICD-10-CM

## 2022-05-06 DIAGNOSIS — I70.209 PERIPHERAL ARTERIOSCLEROSIS (HCC): ICD-10-CM

## 2022-05-06 DIAGNOSIS — E11.42 DIABETIC POLYNEUROPATHY ASSOCIATED WITH TYPE 2 DIABETES MELLITUS (HCC): Primary | ICD-10-CM

## 2022-05-06 DIAGNOSIS — L84 CORNS: ICD-10-CM

## 2022-05-06 DIAGNOSIS — M79.672 PAIN IN BOTH FEET: ICD-10-CM

## 2022-05-06 PROCEDURE — 11056 PARNG/CUTG B9 HYPRKR LES 2-4: CPT | Performed by: PODIATRIST

## 2022-06-07 LAB
ALBUMIN SERPL-MCNC: 4.5 G/DL (ref 3.6–4.6)
ALBUMIN/GLOB SERPL: 1.9 {RATIO} (ref 1.2–2.2)
ALP SERPL-CCNC: 66 IU/L (ref 44–121)
ALT SERPL-CCNC: 22 IU/L (ref 0–44)
AST SERPL-CCNC: 19 IU/L (ref 0–40)
BILIRUB SERPL-MCNC: 0.7 MG/DL (ref 0–1.2)
BUN SERPL-MCNC: 21 MG/DL (ref 8–27)
BUN/CREAT SERPL: 16 (ref 10–24)
CALCIUM SERPL-MCNC: 9.1 MG/DL (ref 8.6–10.2)
CHLORIDE SERPL-SCNC: 104 MMOL/L (ref 96–106)
CO2 SERPL-SCNC: 21 MMOL/L (ref 20–29)
CREAT SERPL-MCNC: 1.31 MG/DL (ref 0.76–1.27)
EGFR: 53 ML/MIN/1.73
GLOBULIN SER-MCNC: 2.4 G/DL (ref 1.5–4.5)
GLUCOSE SERPL-MCNC: 181 MG/DL (ref 65–99)
HBA1C MFR BLD: 8 % (ref 4.8–5.6)
MICRODELETION SYND BLD/T FISH: NORMAL
POTASSIUM SERPL-SCNC: 4.1 MMOL/L (ref 3.5–5.2)
PROT SERPL-MCNC: 6.9 G/DL (ref 6–8.5)
SODIUM SERPL-SCNC: 141 MMOL/L (ref 134–144)

## 2022-06-07 NOTE — TELEPHONE ENCOUNTER
From: Sera Weaver  To: Carolina Gimenez  Sent: 6/7/2022 12:53 PM EDT  Subject: Sinus     I forgot to tell you yesterday I thought I was getting a sinus infection. I am, my face hurts, nasal discharge is more and dark yellow. I tested for covid. .. It is negative. I still am taking mucinexD and using flonase. I usually use Neosporin in my nose at night, I ran out. Seems to help some. I usually get them in summer and fall. No fever. Last time I had one, Dr Blaire Molina gave me Augmentin. It worked, but it really gave me IBS problems. Whatever you give would be fine with me. Thanks. Ariella Alvarez Patient does not want to schedule right now

## 2022-06-08 ENCOUNTER — TELEPHONE (OUTPATIENT)
Dept: WOUND CARE | Facility: HOSPITAL | Age: 87
End: 2022-06-08

## 2022-06-08 ENCOUNTER — TELEPHONE (OUTPATIENT)
Dept: NEPHROLOGY | Facility: CLINIC | Age: 87
End: 2022-06-08

## 2022-06-08 NOTE — TELEPHONE ENCOUNTER
Please call yogesh and set him up for a 30 min follow up,  I will do hios AWV at the same time    We should get him in before he goes to his [facility]

## 2022-06-09 ENCOUNTER — RA CDI HCC (OUTPATIENT)
Dept: OTHER | Facility: HOSPITAL | Age: 87
End: 2022-06-09

## 2022-06-09 NOTE — PROGRESS NOTES
[address]  coding opportunities     E11 65     Chart Reviewed number of suggestions sent to Provider: 1     Patients Insurance     Medicare Insurance: [address] [place] [address]

## 2022-06-13 ENCOUNTER — OFFICE VISIT (OUTPATIENT)
Dept: FAMILY MEDICINE CLINIC | Facility: CLINIC | Age: 87
End: 2022-06-13
Payer: COMMERCIAL

## 2022-06-13 VITALS
TEMPERATURE: 98.5 F | RESPIRATION RATE: 17 BRPM | BODY MASS INDEX: 31.47 KG/M2 | DIASTOLIC BLOOD PRESSURE: 72 MMHG | HEIGHT: 71 IN | WEIGHT: 224.8 LBS | HEART RATE: 59 BPM | SYSTOLIC BLOOD PRESSURE: 126 MMHG

## 2022-06-13 DIAGNOSIS — N18.31 STAGE 3A CHRONIC KIDNEY DISEASE (HCC): ICD-10-CM

## 2022-06-13 DIAGNOSIS — Z12.5 SCREENING FOR PROSTATE CANCER: ICD-10-CM

## 2022-06-13 DIAGNOSIS — Z00.00 MEDICARE ANNUAL WELLNESS VISIT, SUBSEQUENT: Primary | ICD-10-CM

## 2022-06-13 DIAGNOSIS — F32.A MINOR DEPRESSION: ICD-10-CM

## 2022-06-13 DIAGNOSIS — N18.30 BENIGN HYPERTENSION WITH CKD (CHRONIC KIDNEY DISEASE) STAGE III (HCC): ICD-10-CM

## 2022-06-13 DIAGNOSIS — E11.42 DIABETIC POLYNEUROPATHY ASSOCIATED WITH TYPE 2 DIABETES MELLITUS (HCC): ICD-10-CM

## 2022-06-13 DIAGNOSIS — E78.2 MIXED HYPERLIPIDEMIA: ICD-10-CM

## 2022-06-13 DIAGNOSIS — Z13.820 SCREENING FOR OSTEOPOROSIS: ICD-10-CM

## 2022-06-13 DIAGNOSIS — I12.9 BENIGN HYPERTENSION WITH CKD (CHRONIC KIDNEY DISEASE) STAGE III (HCC): ICD-10-CM

## 2022-06-13 PROCEDURE — 3288F FALL RISK ASSESSMENT DOCD: CPT | Performed by: FAMILY MEDICINE

## 2022-06-13 PROCEDURE — 1101F PT FALLS ASSESS-DOCD LE1/YR: CPT | Performed by: FAMILY MEDICINE

## 2022-06-13 PROCEDURE — 1036F TOBACCO NON-USER: CPT | Performed by: FAMILY MEDICINE

## 2022-06-13 PROCEDURE — 1170F FXNL STATUS ASSESSED: CPT | Performed by: FAMILY MEDICINE

## 2022-06-13 PROCEDURE — 1125F AMNT PAIN NOTED PAIN PRSNT: CPT | Performed by: FAMILY MEDICINE

## 2022-06-13 PROCEDURE — G0439 PPPS, SUBSEQ VISIT: HCPCS | Performed by: FAMILY MEDICINE

## 2022-06-13 PROCEDURE — 1160F RVW MEDS BY RX/DR IN RCRD: CPT | Performed by: FAMILY MEDICINE

## 2022-06-13 PROCEDURE — 3725F SCREEN DEPRESSION PERFORMED: CPT | Performed by: FAMILY MEDICINE

## 2022-06-13 RX ORDER — GLIPIZIDE 5 MG/1
5 TABLET, FILM COATED, EXTENDED RELEASE ORAL DAILY
Qty: 90 TABLET | Refills: 1 | Status: SHIPPED | OUTPATIENT
Start: 2022-06-13

## 2022-06-13 NOTE — ASSESSMENT & PLAN NOTE
He is not 100% sure what meds he is taking for DM  He is going right home and will let me know    I will eiother restart his glipizide or increase it  Lab Results   Component Value Date    HGBA1C 8 0 (H) 06/06/2022

## 2022-06-13 NOTE — PATIENT INSTRUCTIONS
Medicare Preventive Visit Patient Instructions  Thank you for completing your Welcome to Medicare Visit or Medicare Annual Wellness Visit today  Your next wellness visit will be due in one year (6/14/2023)  The screening/preventive services that you may require over the next 5-10 years are detailed below  Some tests may not apply to you based off risk factors and/or age  Screening tests ordered at today's visit but not completed yet may show as past due  Also, please note that scanned in results may not display below  Preventive Screenings:  Service Recommendations Previous Testing/Comments   Colorectal Cancer Screening  · Colonoscopy    · Fecal Occult Blood Test (FOBT)/Fecal Immunochemical Test (FIT)  · Fecal DNA/Cologuard Test  · Flexible Sigmoidoscopy Age: 54-65 years old   Colonoscopy: every 10 years (May be performed more frequently if at higher risk)  OR  FOBT/FIT: every 1 year  OR  Cologuard: every 3 years  OR  Sigmoidoscopy: every 5 years  Screening may be recommended earlier than age 48 if at higher risk for colorectal cancer  Also, an individualized decision between you and your healthcare provider will decide whether screening between the ages of 74-80 would be appropriate   Colonoscopy: Not on file  FOBT/FIT: Not on file  Cologuard: Not on file  Sigmoidoscopy: Not on file    Screening Not Indicated     Prostate Cancer Screening Individualized decision between patient and health care provider in men between ages of 53-78   Medicare will cover every 12 months beginning on the day after your 50th birthday PSA: 1 7 ng/mL     Screening Not Indicated     Hepatitis C Screening Once for adults born between 1945 and 1965  More frequently in patients at high risk for Hepatitis C Hep C Antibody: Not on file        Diabetes Screening 1-2 times per year if you're at risk for diabetes or have pre-diabetes Fasting glucose: No results in last 5 years   A1C: 8 0 %    Screening Not Indicated  History Diabetes Cholesterol Screening Once every 5 years if you don't have a lipid disorder  May order more often based on risk factors  Lipid panel: 01/24/2022    Screening Not Indicated  History Lipid Disorder      Other Preventive Screenings Covered by Medicare:  1  Abdominal Aortic Aneurysm (AAA) Screening: covered once if your at risk  You're considered to be at risk if you have a family history of AAA or a male between the age of 73-68 who smoking at least 100 cigarettes in your lifetime  2  Lung Cancer Screening: covers low dose CT scan once per year if you meet all of the following conditions: (1) Age 50-69; (2) No signs or symptoms of lung cancer; (3) Current smoker or have quit smoking within the last 15 years; (4) You have a tobacco smoking history of at least 30 pack years (packs per day x number of years you smoked); (5) You get a written order from a healthcare provider  3  Glaucoma Screening: covered annually if you're considered high risk: (1) You have diabetes OR (2) Family history of glaucoma OR (3)  aged 48 and older OR (3)  American aged 72 and older  3  Osteoporosis Screening: covered every 2 years if you meet one of the following conditions: (1) Have a vertebral abnormality; (2) On glucocorticoid therapy for more than 3 months; (3) Have primary hyperparathyroidism; (4) On osteoporosis medications and need to assess response to drug therapy  5  HIV Screening: covered annually if you're between the age of 12-76  Also covered annually if you are younger than 13 and older than 72 with risk factors for HIV infection  For pregnant patients, it is covered up to 3 times per pregnancy      Immunizations:  Immunization Recommendations   Influenza Vaccine Annual influenza vaccination during flu season is recommended for all persons aged >= 6 months who do not have contraindications   Pneumococcal Vaccine (Prevnar and Pneumovax)  * Prevnar = PCV13  * Pneumovax = PPSV23 Adults 25-60 years old: 1-3 doses may be recommended based on certain risk factors  Adults 72 years old: Prevnar (PCV13) vaccine recommended followed by Pneumovax (PPSV23) vaccine  If already received PPSV23 since turning 65, then PCV13 recommended at least one year after PPSV23 dose  Hepatitis B Vaccine 3 dose series if at intermediate or high risk (ex: diabetes, end stage renal disease, liver disease)   Tetanus (Td) Vaccine - COST NOT COVERED BY MEDICARE PART B Following completion of primary series, a booster dose should be given every 10 years to maintain immunity against tetanus  Td may also be given as tetanus wound prophylaxis  Tdap Vaccine - COST NOT COVERED BY MEDICARE PART B Recommended at least once for all adults  For pregnant patients, recommended with each pregnancy  Shingles Vaccine (Shingrix) - COST NOT COVERED BY MEDICARE PART B  2 shot series recommended in those aged 48 and above     Health Maintenance Due:  There are no preventive care reminders to display for this patient  Immunizations Due:      Topic Date Due    DTaP,Tdap,and Td Vaccines (2 - Td or Tdap) 01/11/2021    COVID-19 Vaccine (4 - Booster for Pfizer series) 04/18/2022     Advance Directives   What are advance directives? Advance directives are legal documents that state your wishes and plans for medical care  These plans are made ahead of time in case you lose your ability to make decisions for yourself  Advance directives can apply to any medical decision, such as the treatments you want, and if you want to donate organs  What are the types of advance directives? There are many types of advance directives, and each state has rules about how to use them  You may choose a combination of any of the following:  · Living will: This is a written record of the treatment you want  You can also choose which treatments you do not want, which to limit, and which to stop at a certain time  This includes surgery, medicine, IV fluid, and tube feedings  · Durable power of  for healthcare [facility]): This is a written record that states who you want to make healthcare choices for you when you are unable to make them for yourself  This person, called a proxy, is usually a family member or a friend  You may choose more than 1 proxy  · Do not resuscitate (DNR) order:  A DNR order is used in case your heart stops beating or you stop breathing  It is a request not to have certain forms of treatment, such as CPR  A DNR order may be included in other types of advance directives  · Medical directive: This covers the care that you want if you are in a coma, near death, or unable to make decisions for yourself  You can list the treatments you want for each condition  Treatment may include pain medicine, surgery, blood transfusions, dialysis, IV or tube feedings, and a ventilator (breathing machine)  · Values history: This document has questions about your views, beliefs, and how you feel and think about life  This information can help others choose the care that you would choose  Why are advance directives important? An advance directive helps you control your care  Although spoken wishes may be used, it is better to have your wishes written down  Spoken wishes can be misunderstood, or not followed  Treatments may be given even if you do not want them  An advance directive may make it easier for your family to make difficult choices about your care  Weight Management   Why it is important to manage your weight:  Being overweight increases your risk of health conditions such as heart disease, high blood pressure, type 2 diabetes, and certain types of cancer  It can also increase your risk for osteoarthritis, sleep apnea, and other respiratory problems  Aim for a slow, steady weight loss  Even a small amount of weight loss can lower your risk of health problems    How to lose weight safely:  A safe and healthy way to lose weight is to eat fewer calories and get regular exercise  You can lose up about 1 pound a week by decreasing the number of calories you eat by 500 calories each day  Healthy meal plan for weight management:  A healthy meal plan includes a variety of foods, contains fewer calories, and helps you stay healthy  A healthy meal plan includes the following:  · Eat whole-grain foods more often  A healthy meal plan should contain fiber  Fiber is the part of grains, fruits, and vegetables that is not broken down by your body  Whole-grain foods are healthy and provide extra fiber in your diet  Some examples of whole-grain foods are whole-wheat breads and pastas, oatmeal, brown rice, and bulgur  · Eat a variety of vegetables every day  Include dark, leafy greens such as spinach, kale, regan greens, and mustard greens  Eat yellow and orange vegetables such as carrots, sweet potatoes, and winter squash  · Eat a variety of fruits every day  Choose fresh or canned fruit (canned in its own juice or light syrup) instead of juice  Fruit juice has very little or no fiber  · Eat low-fat dairy foods  Drink fat-free (skim) milk or 1% milk  Eat fat-free yogurt and low-fat cottage cheese  Try low-fat cheeses such as mozzarella and other reduced-fat cheeses  · Choose meat and other protein foods that are low in fat  Choose beans or other legumes such as split peas or lentils  Choose fish, skinless poultry (chicken or turkey), or lean cuts of red meat (beef or pork)  Before you cook meat or poultry, cut off any visible fat  · Use less fat and oil  Try baking foods instead of frying them  Add less fat, such as margarine, sour cream, regular salad dressing and mayonnaise to foods  Eat fewer high-fat foods  Some examples of high-fat foods include french fries, doughnuts, ice cream, and cakes  · Eat fewer sweets  Limit foods and drinks that are high in sugar  This includes candy, cookies, regular soda, and sweetened drinks    Exercise:  Exercise at least 30 minutes per day on most days of the week  Some examples of exercise include walking, biking, dancing, and swimming  You can also fit in more physical activity by taking the stairs instead of the elevator or parking farther away from stores  Ask your healthcare provider about the best exercise plan for you  © Copyright [employer] 2018 Information is for End User's use only and may not be sold, redistributed or otherwise used for commercial purposes  All illustrations and images included in CareNotes® are the copyrighted property of A D A M , Inc  or [address] [place] [address]  Obesity   AMBULATORY CARE:   Obesity  means your body mass index (BMI) is greater than 30  Your healthcare provider will use your height and weight to measure your BMI  The risks of obesity include  many health problems, including injuries or physical disability  · Diabetes (high blood sugar level)    · High blood pressure or high cholesterol    · Heart disease    · Stroke    · Gallbladder or liver disease    · Cancer of the colon, breast, prostate, liver, or kidney    · Sleep apnea    · Arthritis or gout    Screening  is done to check for health conditions before you have signs or symptoms  If you are 28to 79years old, your blood sugar level may be checked every 3 years for signs of prediabetes or diabetes  Your healthcare provider will check your blood pressure at each visit  High blood pressure can lead to a stroke or other problems  Your provider may check for signs of heart disease, cancer, or other health problems  Seek care immediately if:   · You have a severe headache, confusion, or difficulty speaking  · You have weakness on one side of your body  · You have chest pain, sweating, or shortness of breath  Call your doctor if:   · You have symptoms of gallbladder or liver disease, such as pain in your upper abdomen  · You have knee or hip pain and discomfort while walking      · You have symptoms of diabetes, such as intense hunger and thirst, and frequent urination  · You have symptoms of sleep apnea, such as snoring or daytime sleepiness  · You have questions or concerns about your condition or care  Treatment for obesity  focuses on helping you lose weight to improve your health  Even a small decrease in BMI can reduce the risk for many health problems  Your healthcare provider will help you set a weight-loss goal   · Lifestyle changes  are the first step in treating obesity  These include making healthy food choices and getting regular physical activity  Your healthcare provider may suggest a weight-loss program that involves coaching, education, and therapy  · Medicine  may help you lose weight when it is used with a healthy foods and physical activity  · Surgery  can help you lose weight if you are very obese and have other health problems  There are several types of weight-loss surgery  Ask your healthcare provider for more information  Tips for safe weight loss:   · Set small, realistic goals  An example of a small goal is to walk for 20 minutes 5 days a week  Anther goal is to lose 5% of your body weight  · Tell friends, family members, and coworkers about your goals  and ask for their support  Ask a friend to lose weight with you, or join a weight-loss support group  · Identify foods or triggers that may cause you to overeat , and find ways to avoid them  Remove tempting high-calorie foods from your home and workplace  Place a bowl of fresh fruit on your kitchen counter  If stress causes you to eat, then find other ways to cope with stress  A counselor or therapist may be able to help you  · Keep a diary to track what you eat and drink  Also write down how many minutes of physical activity you do each day  Weigh yourself once a week and record it in your diary  Eating changes: You will need to eat 500 to 1,000 fewer calories each day than you currently eat to lose 1 to 2 pounds a week   The following changes will help you cut calories:  · Eat smaller portions  Use small plates, no larger than 9 inches in diameter  Fill your plate half full of fruits and vegetables  Measure your food using measuring cups until you know what a serving size looks like  · Eat 3 meals and 1 or 2 snacks each day  Plan your meals in advance  Magalys Morris and eat at home most of the time  Eat slowly  Do not skip meals  Skipping meals can lead to overeating later in the day  This can make it harder for you to lose weight  Talk with a dietitian to help you make a meal plan and schedule that is right for you  · Eat fruits and vegetables at every meal   They are low in calories and high in fiber, which makes you feel full  Do not add butter, margarine, or cream sauce to vegetables  Use herbs to season steamed vegetables  · Eat less fat and fewer fried foods  Eat more baked or grilled chicken and fish  These protein sources are lower in calories and fat than red meat  Limit fast food  Dress your salads with olive oil and vinegar instead of bottled dressing  · Limit the amount of sugar you eat  Do not drink sugary beverages  Limit alcohol  Activity changes:  Physical activity is good for your body in many ways  It helps you burn calories and build strong muscles  It decreases stress and depression, and improves your mood  It can also help you sleep better  Talk to your healthcare provider before you begin an exercise program   · Exercise for at least 30 minutes 5 days a week  Start slowly  Set aside time each day for physical activity that you enjoy and that is convenient for you  It is best to do both weight training and an activity that increases your heart rate, such as walking, bicycling, or swimming  · Find ways to be more active  Do yard work and housecleaning  Walk up the stairs instead of using elevators   Spend your leisure time going to events that require walking, such as outdoor festivals or fairs  This extra physical activity can help you lose weight and keep it off  Follow up with your doctor as directed: You may need to meet with a dietitian  Write down your questions so you remember to ask them during your visits  © Copyright [employer] 2022 Information is for End User's use only and may not be sold, redistributed or otherwise used for commercial purposes  All illustrations and images included in CareNotes® are the copyrighted property of [employer]  or Stanislav Self  The above information is an  only  It is not intended as medical advice for individual conditions or treatments  Talk to your doctor, nurse or pharmacist before following any medical regimen to see if it is safe and effective for you

## 2022-06-13 NOTE — PROGRESS NOTES
Assessment and Plan:     Problem List Items Addressed This Visit        Endocrine    Diabetic polyneuropathy associated with type 2 diabetes mellitus ([facility] [address] )     He is not 100% sure what meds he is taking for DM  He is going right home and will let me know  I will eiother restart his glipizide or increase it  Lab Results   Component Value Date    HGBA1C 8 0 (H) 06/06/2022              Relevant Medications    glipiZIDE (GLUCOTROL XL) 5 mg 24 hr tablet    Other Relevant Orders    DXA bone density spine hip and pelvis    Hemoglobin A1C    Microalbumin / creatinine urine ratio       Cardiovascular and Mediastinum    Benign hypertension with CKD (chronic kidney disease) stage III (HCC)    Relevant Orders    DXA bone density spine hip and pelvis    Comprehensive metabolic panel       Genitourinary    Stage 3a chronic kidney disease ([facility] [address] )    Relevant Orders    DXA bone density spine hip and pelvis    CBC and Platelet       Other    Mixed hyperlipidemia    Relevant Orders    DXA bone density spine hip and pelvis    Lipid Panel with Direct LDL reflex    Minor depression      Other Visit Diagnoses     Medicare annual wellness visit, subsequent    -  Primary    Relevant Orders    DXA bone density spine hip and pelvis    Screening for prostate cancer        Relevant Orders    DXA bone density spine hip and pelvis    Screening for osteoporosis        Relevant Orders    DXA bone density spine hip and pelvis           Preventive health issues were discussed with patient, and age appropriate screening tests were ordered as noted in patient's After Visit Summary  Personalized health advice and appropriate referrals for health education or preventive services given if needed, as noted in patient's After Visit Summary       History of Present Illness:     Patient presents for Medicare Annual Wellness visit  Pt feels the viibryd is working well    Patient Care Team:  Minoo Bradley DO as PCP - [address], JULEE Loera Ulisses Andres MD (Nephrology)     Problem List:     Patient Active Problem List   Diagnosis    Corns    Pain in both feet    Diabetic polyneuropathy associated with type 2 diabetes mellitus ([facility] [address] )    Mixed hyperlipidemia    Benign hypertension with CKD (chronic kidney disease) stage III (HCC)    Minor depression    Acquired deformity of foot    Metatarsalgia of both feet    Basal cell carcinoma of skin    Stage 3a chronic kidney disease ([address] )    Seasonal allergies    Hypokalemia    Microalbuminuria      Past Medical and Surgical History:     Past Medical History:   Diagnosis Date    Ascending cholangitis     Last assessed 1/22/2015     Atherosclerosis of arteries of extremities ([facility] [address] )     Resolved 12/19/2016     Cancer ([facility] [address] )     basal cell on  left shoulder and left wrist    Chronic kidney disease     stage 3    Diabetes mellitus ([facility] [address] )     [place] (hard of hearing)     Hyperlipidemia     Hypertension     Seasonal allergies     Wears hearing aid in both ears      Past Surgical History:   Procedure Laterality Date    APPENDECTOMY      Last assessed 1/22/2015     CHOLECYSTECTOMY      Last assessed 1/22/2015    Man Serafin FRACTURE SURGERY      Open treatment of fracture of the radial shaft  Last assessed 1/22/2015     ND XCAPSL CTRC RMVL INSJ IO LENS PROSTH W/O ECP Right 2/7/2022    Procedure: EXTRACTION EXTRACAPSULAR CATARACT PHACO INTRAOCULAR LENS (IOL);   Surgeon: Jaden Fishman MD;  Location[facility] MAIN OR;  Service: Ophthalmology    TONSILLECTOMY      Last assessed 1/22/2015       Family History:     Family History   Problem Relation Age of Onset    Hypertension Father     Other Father         Gastric ulcer     Heart disease Mother     Hypertension Mother     Liver cancer Mother     Melanoma Mother     Diabetes Brother     Diabetes Family     Diabetes Maternal Grandfather     Mental illness Neg Hx       Social History:     Social History     Socioeconomic History    Marital status: /Civil Union     Spouse name: None    Number of children: None    Years of education: None    Highest education level: None   Occupational History    None   Tobacco Use    Smoking status: Never Smoker    Smokeless tobacco: Never Used   Vaping Use    Vaping Use: Never used   Substance and Sexual Activity    Alcohol use: Yes     Comment: rare    Drug use: Never    Sexual activity: None   Other Topics Concern    None   Social History Narrative    None     Social Determinants of Health     Financial Resource Strain: Not on file   Food Insecurity: Not on file   Transportation Needs: Not on file   Physical Activity: Not on file   Stress: Not on file   Social Connections: Not on file   Intimate Partner Violence: Not on file   Housing Stability: Not on file      Medications and Allergies:     Current Outpatient Medications   Medication Sig Dispense Refill    amLODIPine-benazepril (LOTREL) 10-20 MG per capsule TAKE 1 CAPSULE BY MOUTH  DAILY (Patient taking differently: Take 1 capsule by mouth daily at bedtime) 90 capsule 3    Bioflavonoid Products (ZULEYKA-C) 500-200-60 MG TABS Take by mouth      carvedilol (COREG CR) 20 MG 24 hr capsule TAKE 1 CAPSULE BY MOUTH  DAILY 90 capsule 1    Cinnamon 500 MG TABS Take by mouth      clotrimazole-betamethasone (LOTRISONE) 1-0 05 % cream Apply 1 application topically 2 (two) times a day To affected area 45 g 5    fluticasone (FLONASE) 50 mcg/act nasal spray into each nostril      gatifloxacin (ZYMAXID) 0 5 % INSTILL 1 DROP INTO RIGHT EYE TWICE A DAY      glipiZIDE (GLUCOTROL XL) 5 mg 24 hr tablet Take 1 tablet (5 mg total) by mouth daily 90 tablet 1    glucose blood test strip by In Vitro route      hydrocortisone 1 % cream Apply topically 4 (four) times a day as needed for irritation 120 g 0    Januvia 50 MG tablet TAKE 1 TABLET BY MOUTH  DAILY 90 tablet 3    ketorolac (ACULAR) 0 5 % ophthalmic solution INSTILL 1 DROP INTO RIGHT EYE 4 TIMES A DAY      metFORMIN (GLUCOPHAGE-XR) 500 mg 24 hr tablet TAKE 1 TABLET BY MOUTH  DAILY WITH DINNER 90 tablet 3    Multiple Vitamins-Iron (DAILY MULTIPLE VITAMIN/IRON PO) Take by mouth      Omega-3 Fatty Acids (FISH OIL) 1000 MG CPDR Take by mouth      simvastatin (ZOCOR) 10 mg tablet TAKE 1 TABLET BY MOUTH  DAILY AT BEDTIME 90 tablet 3    Viibryd 20 MG tablet TAKE 1 TABLET BY MOUTH  DAILY 90 tablet 1     No current facility-administered medications for this visit  Allergies   Allergen Reactions    Pollen Extract Allergic Rhinitis      Immunizations:     Immunization History   Administered Date(s) Administered    COVID-19 PFIZER VACCINE 0 3 ML IM 02/08/2021, 03/01/2021, 12/18/2021    INFLUENZA 09/14/2009, 09/19/2011    Influenza Quadrivalent Preservative Free 3 years and older IM 09/10/2012, 10/08/2013, 09/16/2014    Influenza Split High Dose Preservative Free IM 10/27/2015, 11/02/2016, 11/30/2017, 10/18/2018    Influenza, high dose seasonal 0 7 mL 10/11/2019, 09/08/2020, 11/04/2021    Pneumococcal Conjugate 13-Valent 10/27/2015    Pneumococcal Polysaccharide PPV23 11/14/2007    Td (adult), adsorbed 05/04/2007    Tdap 01/11/2011      Health Maintenance: There are no preventive care reminders to display for this patient  Topic Date Due    DTaP,Tdap,and Td Vaccines (2 - Td or Tdap) 01/11/2021    COVID-19 Vaccine (4 - Booster for Pfizer series) 04/18/2022      Medicare Health Risk Assessment:     /72   Pulse 59   Temp 98 5 °F (36 9 °C)   Resp 17   Ht 5' 11" (1 803 m)   Wt 102 kg (224 lb 12 8 oz)   BMI 31 35 kg/m²      Kayy Prior is here for his Subsequent Wellness visit  Last Medicare Wellness visit information reviewed, patient interviewed, no change since last AWV  Health Risk Assessment:   Patient rates overall health as good  Patient feels that their physical health rating is same  Patient is very satisfied with their life  Eyesight was rated as slightly worse  Hearing was rated as same   Patient feels that their emotional and mental health rating is slightly worse  Patients states they are never, rarely angry  Patient states they are sometimes unusually tired/fatigued  Pain experienced in the last 7 days has been none  Patient states that he has experienced no weight loss or gain in last 6 months  Depression Screening:   PHQ-9 Score: 4      Fall Risk Screening: In the past year, patient has experienced: no history of falling in past year      Home Safety:  Patient does not have trouble with stairs inside or outside of their home  Patient has working smoke alarms and has working carbon monoxide detector  Home safety hazards include: none  Nutrition:   Current diet is Regular  Medications:   Patient is currently taking over-the-counter supplements  OTC medications include: see medication list  Patient is able to manage medications  Activities of Daily Living (ADLs)/Instrumental Activities of Daily Living (IADLs):   Walk and transfer into and out of bed and chair?: Yes  Dress and groom yourself?: Yes    Bathe or shower yourself?: Yes    Feed yourself? Yes  Do your laundry/housekeeping?: Yes  Manage your money, pay your bills and track your expenses?: Yes  Make your own meals?: Yes    Do your own shopping?: Yes    Previous Hospitalizations:   Any hospitalizations or ED visits within the last 12 months?: No      Advance Care Planning:   Living will: Yes    Durable POA for healthcare:  Yes    Advanced directive: Yes      Cognitive Screening:   Provider or family/friend/caregiver concerned regarding cognition?: No    PREVENTIVE SCREENINGS      Cardiovascular Screening:    General: Screening Not Indicated, History Lipid Disorder and Risks and Benefits Discussed    Due for: Lipid Panel      Diabetes Screening:     General: Screening Not Indicated, History Diabetes and Risks and Benefits Discussed    Due for: Blood Glucose      Colorectal Cancer Screening:     General: Screening Not Indicated and Risks and Benefits Discussed      Prostate Cancer Screening:    General: Screening Not Indicated and Risks and Benefits Discussed    Due for: PSA      Osteoporosis Screening:    General: Risks and Benefits Discussed    Due for: Bone Density Ultrasound      Abdominal Aortic Aneurysm (AAA) Screening:        General: Screening Not Indicated and Risks and Benefits Discussed      Lung Cancer Screening:     General: Screening Not Indicated      Hepatitis C Screening:    General: Risks and Benefits Discussed and Patient Declines    Screening, Brief Intervention, and Referral to Treatment (SBIRT)    Screening  Typical number of drinks in a day: 0  Typical number of drinks in a week: 1  Interpretation: Low risk drinking behavior  Single Item Drug Screening:  How often have you used an illegal drug (including marijuana) or a prescription medication for non-medical reasons in the past year? never    Single Item Drug Screen Score: 0  Interpretation: Negative screen for possible drug use disorder    Brief Intervention  Alcohol & drug use screenings were reviewed  No concerns regarding substance use disorder identified       Recent Results (from the past 672 hour(s))   Comprehensive metabolic panel    Collection Time: 06/06/22  8:51 AM   Result Value Ref Range    Glucose, Random 181 (H) 65 - 99 mg/dL    BUN 21 8 - 27 mg/dL    Creatinine 1 31 (H) 0 76 - 1 27 mg/dL    eGFR 53 (L) >59 mL/min/1 73    SL AMB BUN/CREATININE RATIO 16 10 - 24    Sodium 141 134 - 144 mmol/L    Potassium 4 1 3 5 - 5 2 mmol/L    Chloride 104 96 - 106 mmol/L    CO2 21 20 - 29 mmol/L    CALCIUM 9 1 8 6 - 10 2 mg/dL    Protein, Total 6 9 6 0 - 8 5 g/dL    Albumin 4 5 3 6 - 4 6 g/dL    Globulin, Total 2 4 1 5 - 4 5 g/dL    Albumin/Globulin Ratio 1 9 1 2 - 2 2    TOTAL BILIRUBIN 0 7 0 0 - 1 2 mg/dL    Alk Phos Isoenzymes 66 44 - 121 IU/L    AST 19 0 - 40 IU/L    ALT 22 0 - 44 IU/L   [employer] Kidney Stone Panel    Collection Time: 06/06/22  8:51 AM   Result Value Ref Range    Interpretation Note    Hemoglobin A1c (w/out EAG)    Collection Time: 06/06/22  8:51 AM   Result Value Ref Range    Hemoglobin A1C 8 0 (H) 4 8 - 5 6 %         Nikita Maxwell DO  BMI Counseling: Body mass index is 31 35 kg/m²  The BMI is above normal  Nutrition recommendations include reducing portion sizes  Depression Screening Follow-up Plan: Patient's depression screening was positive with a PHQ-2 score of   Their PHQ-9 score was 4  Patient assessed for underlying major depression  They have no active suicidal ideations  Brief counseling provided and recommend additional follow-up/re-evaluation next office visit

## 2022-06-21 DIAGNOSIS — F32.A MINOR DEPRESSION: ICD-10-CM

## 2022-06-22 RX ORDER — VILAZODONE HYDROCHLORIDE 20 MG/1
TABLET ORAL
Qty: 90 TABLET | Refills: 3 | Status: SHIPPED | OUTPATIENT
Start: 2022-06-22

## 2022-06-29 DIAGNOSIS — E11.42 DIABETIC POLYNEUROPATHY ASSOCIATED WITH TYPE 2 DIABETES MELLITUS (HCC): ICD-10-CM

## 2022-06-29 DIAGNOSIS — I10 BENIGN ESSENTIAL HYPERTENSION: ICD-10-CM

## 2022-06-29 RX ORDER — AMLODIPINE BESYLATE AND BENAZEPRIL HYDROCHLORIDE 10; 20 MG/1; MG/1
1 CAPSULE ORAL DAILY
Qty: 90 CAPSULE | Refills: 0 | Status: SHIPPED | OUTPATIENT
Start: 2022-06-29

## 2022-06-29 RX ORDER — METFORMIN HYDROCHLORIDE 500 MG/1
TABLET, EXTENDED RELEASE ORAL
Qty: 90 TABLET | Refills: 0 | Status: SHIPPED | OUTPATIENT
Start: 2022-06-29

## 2022-06-29 RX ORDER — SITAGLIPTIN 50 MG/1
TABLET, FILM COATED ORAL
Qty: 90 TABLET | Refills: 0 | Status: SHIPPED | OUTPATIENT
Start: 2022-06-29

## 2022-06-29 RX ORDER — CARVEDILOL PHOSPHATE 20 MG/1
CAPSULE, EXTENDED RELEASE ORAL
Qty: 90 CAPSULE | Refills: 0 | Status: SHIPPED | OUTPATIENT
Start: 2022-06-29

## 2022-07-13 ENCOUNTER — OFFICE VISIT (OUTPATIENT)
Dept: PODIATRY | Facility: CLINIC | Age: 87
End: 2022-07-13
Payer: COMMERCIAL

## 2022-07-13 VITALS — WEIGHT: 224 LBS | HEIGHT: 71 IN | BODY MASS INDEX: 31.36 KG/M2

## 2022-07-13 DIAGNOSIS — M79.672 PAIN IN BOTH FEET: ICD-10-CM

## 2022-07-13 DIAGNOSIS — E11.42 DIABETIC POLYNEUROPATHY ASSOCIATED WITH TYPE 2 DIABETES MELLITUS (HCC): Primary | ICD-10-CM

## 2022-07-13 DIAGNOSIS — M79.671 PAIN IN BOTH FEET: ICD-10-CM

## 2022-07-13 DIAGNOSIS — L84 CORNS: ICD-10-CM

## 2022-07-13 DIAGNOSIS — I70.209 PERIPHERAL ARTERIOSCLEROSIS (HCC): ICD-10-CM

## 2022-07-13 PROCEDURE — 11056 PARNG/CUTG B9 HYPRKR LES 2-4: CPT | Performed by: PODIATRIST

## 2022-08-02 ENCOUNTER — TELEPHONE (OUTPATIENT)
Dept: NEPHROLOGY | Facility: CLINIC | Age: 87
End: 2022-08-02

## 2022-08-02 NOTE — TELEPHONE ENCOUNTER
Wife Javi Montiel called to see if there was anything available before or after 8/23   Looked in schedule but didn't find anything

## 2022-08-15 ENCOUNTER — TELEPHONE (OUTPATIENT)
Dept: NEPHROLOGY | Facility: CLINIC | Age: 87
End: 2022-08-15

## 2022-08-15 NOTE — TELEPHONE ENCOUNTER
Called and spoke with [employer] to complete their bloodwork prior to their appointment on 08/23/22 with Dr Giovanny Porras at the [facility]

## 2022-08-17 LAB
BASOPHILS # BLD AUTO: 0 X10E3/UL (ref 0–0.2)
BASOPHILS NFR BLD AUTO: 1 %
BUN SERPL-MCNC: 17 MG/DL (ref 8–27)
BUN/CREAT SERPL: 13 (ref 10–24)
CALCIUM SERPL-MCNC: 9.1 MG/DL (ref 8.6–10.2)
CHLORIDE SERPL-SCNC: 104 MMOL/L (ref 96–106)
CO2 SERPL-SCNC: 22 MMOL/L (ref 20–29)
CREAT SERPL-MCNC: 1.34 MG/DL (ref 0.76–1.27)
CREAT UR-MCNC: 87.9 MG/DL
EGFR: 52 ML/MIN/1.73
EOSINOPHIL # BLD AUTO: 0.1 X10E3/UL (ref 0–0.4)
EOSINOPHIL NFR BLD AUTO: 3 %
ERYTHROCYTE [DISTWIDTH] IN BLOOD BY AUTOMATED COUNT: 13.1 % (ref 11.6–15.4)
GLUCOSE SERPL-MCNC: 189 MG/DL (ref 65–99)
HCT VFR BLD AUTO: 41.6 % (ref 37.5–51)
HGB BLD-MCNC: 14 G/DL (ref 13–17.7)
IMM GRANULOCYTES # BLD: 0 X10E3/UL (ref 0–0.1)
IMM GRANULOCYTES NFR BLD: 0 %
LYMPHOCYTES # BLD AUTO: 1.5 X10E3/UL (ref 0.7–3.1)
LYMPHOCYTES NFR BLD AUTO: 30 %
MAGNESIUM SERPL-MCNC: 2.3 MG/DL (ref 1.6–2.3)
MCH RBC QN AUTO: 29.9 PG (ref 26.6–33)
MCHC RBC AUTO-ENTMCNC: 33.7 G/DL (ref 31.5–35.7)
MCV RBC AUTO: 89 FL (ref 79–97)
MONOCYTES # BLD AUTO: 0.5 X10E3/UL (ref 0.1–0.9)
MONOCYTES NFR BLD AUTO: 10 %
NEUTROPHILS # BLD AUTO: 2.9 X10E3/UL (ref 1.4–7)
NEUTROPHILS NFR BLD AUTO: 56 %
PHOSPHATE SERPL-MCNC: 3.4 MG/DL (ref 2.8–4.1)
PLATELET # BLD AUTO: 165 X10E3/UL (ref 150–450)
POTASSIUM SERPL-SCNC: 4.2 MMOL/L (ref 3.5–5.2)
PROT UR-MCNC: 22 MG/DL
PROT/CREAT UR: 250 MG/G CREAT (ref 0–200)
PTH-INTACT SERPL-MCNC: 29 PG/ML (ref 15–65)
RBC # BLD AUTO: 4.69 X10E6/UL (ref 4.14–5.8)
SODIUM SERPL-SCNC: 141 MMOL/L (ref 134–144)
WBC # BLD AUTO: 5.2 X10E3/UL (ref 3.4–10.8)

## 2022-08-23 ENCOUNTER — OFFICE VISIT (OUTPATIENT)
Dept: NEPHROLOGY | Facility: CLINIC | Age: 87
End: 2022-08-23
Payer: COMMERCIAL

## 2022-08-23 VITALS
DIASTOLIC BLOOD PRESSURE: 72 MMHG | BODY MASS INDEX: 32.06 KG/M2 | SYSTOLIC BLOOD PRESSURE: 144 MMHG | HEIGHT: 71 IN | WEIGHT: 229 LBS | HEART RATE: 70 BPM

## 2022-08-23 DIAGNOSIS — N18.31 STAGE 3A CHRONIC KIDNEY DISEASE (HCC): ICD-10-CM

## 2022-08-23 DIAGNOSIS — N18.30 BENIGN HYPERTENSION WITH CKD (CHRONIC KIDNEY DISEASE) STAGE III (HCC): Primary | ICD-10-CM

## 2022-08-23 DIAGNOSIS — R80.9 MICROALBUMINURIA: ICD-10-CM

## 2022-08-23 DIAGNOSIS — I12.9 BENIGN HYPERTENSION WITH CKD (CHRONIC KIDNEY DISEASE) STAGE III (HCC): Primary | ICD-10-CM

## 2022-08-23 PROCEDURE — 1160F RVW MEDS BY RX/DR IN RCRD: CPT | Performed by: INTERNAL MEDICINE

## 2022-08-23 PROCEDURE — 99214 OFFICE O/P EST MOD 30 MIN: CPT | Performed by: INTERNAL MEDICINE

## 2022-08-23 NOTE — PROGRESS NOTES
NEPHROLOGY OUTPATIENT PROGRESS NOTE   Alfonso Fritz 81 y o  male MRN: [id number]  DATE: 8/23/2022  Reason for visit:   Chief Complaint   Patient presents with    Follow-up     CKD3, HTN     ASSESSMENT and PLAN:  CKD stage IIIA, baseline creatinine 1 3 to 1 4 going back to 2018, 1 2 going back to 2016  -last creatinine 1 3 stable in August 2022   -CKD suspect in the setting of long-term hypertension causing hypertensive arteriosclerosis, diabetes, age-related nephron loss  -UA bland in May 2021    -if creatinine worsens, consider renal ultrasound in the future  -avoid nephrotoxins or NSAIDs  -advised to drink more free water to stay hydrated     Microalbuminuria, last UPC ratio 250 mg in August 2022  urine microalbumin/creatinine ratio 108 mg in early 2022    -this could be in the setting of underlying hypertension, diabetes  -currently remains on benazepril 20 mg daily  May consider increasing to 30 mg daily if worsening/persistent proteinuria  -last hemoglobin A1c 8 0 in June 2022  Needs better control of blood glucose      Hypertension  -blood pressure slightly above goal in the office today   -Currently remains on amlodipine/benazepril 10/20 mg daily, Coreg 20 mg daily   -discussed possibly increasing benazepril with adding additional 10 mg  Would like to check his BP at home daily with upper arm BP machine and advised him to call back in 7 to 10 days with daily BP readings   -if BP remains persistently greater than 135/85, consider increasing benazepril   -salt restricted diet recommended    Diagnoses and all orders for this visit:    Benign hypertension with CKD (chronic kidney disease) stage III (HCC)  -     Basic metabolic panel; Future  -     CBC; Future  -     Magnesium; Future  -     Phosphorus; Future  -     Microalbumin / creatinine urine ratio; Future    Stage 3a chronic kidney disease (HCC)  -     Basic metabolic panel; Future  -     CBC; Future  -     Magnesium;  Future  -     Phosphorus; Future  -     Microalbumin / creatinine urine ratio; Future    Microalbuminuria  -     Microalbumin / creatinine urine ratio; Future          SUBJECTIVE / HPI:  Christophe ARRIETA 19 y o  year old male with medical issues of hypertension for 26 years, diabetes for 11 years, hyperlipidemia, CKD stage 3 with baseline creatinine 1 3 to 1 4 since 2018, prior 1 2 going back to 2016 who presents for regular follow-up of CKD  Last serum creatinine stable at baseline  He overall feels well  Patient's initial baseline creatinine 1 2 going back to 2016 although since 2018 seems to be around 1 3 to 1 4  Denies any urinary complaint   No recent NSAID exposure   Denies chest pain, shortness of breath, nausea, vomiting  REVIEW OF SYSTEMS:  More than 10 point review of systems were obtained and discussed in length with the patient  Complete review of systems were negative / unremarkable except mentioned above  PHYSICAL EXAM:  Vitals:    08/23/22 1455 08/23/22 1526   BP: 136/84 144/72   BP Location: Right arm    Patient Position: Sitting    Cuff Size: Adult    Pulse: 70    Weight: 104 kg (229 lb)    Height: 5' 11" (1 803 m)      Body mass index is 31 94 kg/m²  Physical Exam  Vitals reviewed  Constitutional:       Appearance: He is well-developed  HENT:      Head: Normocephalic and atraumatic  Right Ear: External ear normal       Left Ear: External ear normal    Eyes:      General: No scleral icterus  Conjunctiva/sclera: Conjunctivae normal    Cardiovascular:      Comments: S1, S2 present  Pulmonary:      Effort: Pulmonary effort is normal  No respiratory distress  Breath sounds: Normal breath sounds  No wheezing or rales  Abdominal:      General: Bowel sounds are normal  There is no distension  Palpations: Abdomen is soft  Tenderness: There is no abdominal tenderness  Musculoskeletal:         General: No tenderness or deformity  Right lower leg: No edema        Left lower leg: No edema    Lymphadenopathy:      Cervical: No cervical adenopathy  Skin:     Findings: No rash  Neurological:      Mental Status: He is alert and oriented to person, place, and time  Psychiatric:         Behavior: Behavior normal          PAST MEDICAL HISTORY:  Past Medical History:   Diagnosis Date    Ascending cholangitis     Last assessed 1/22/2015     Atherosclerosis of arteries of extremities ([facility] [address] )     Resolved 12/19/2016     Cancer ([facility] [address] )     basal cell on  left shoulder and left wrist    Chronic kidney disease     stage 3    Diabetes mellitus ([facility] [address] )     [place] (hard of hearing)     Hyperlipidemia     Hypertension     Seasonal allergies     Wears hearing aid in both ears        PAST SURGICAL HISTORY:  Past Surgical History:   Procedure Laterality Date    APPENDECTOMY      Last assessed 1/22/2015     CHOLECYSTECTOMY      Last assessed 1/22/2015    Cheri Arnold FRACTURE SURGERY      Open treatment of fracture of the radial shaft  Last assessed 1/22/2015     UT XCAPSL CTRC RMVL INSJ IO LENS PROSTH W/O ECP Right 2/7/2022    Procedure: EXTRACTION EXTRACAPSULAR CATARACT PHACO INTRAOCULAR LENS (IOL);   Surgeon: Juan Ramon Scott MD;  Location: [facility] MAIN OR;  Service: Ophthalmology    TONSILLECTOMY      Last assessed 1/22/2015        SOCIAL HISTORY:  Social History     Substance and Sexual Activity   Alcohol Use Yes    Comment: rare     Social History     Substance and Sexual Activity   Drug Use Never     Social History     Tobacco Use   Smoking Status Never Smoker   Smokeless Tobacco Never Used       FAMILY HISTORY:  Family History   Problem Relation Age of Onset    Hypertension Father     Other Father         Gastric ulcer     Heart disease Mother     Hypertension Mother     Liver cancer Mother     Melanoma Mother     Diabetes Brother     Diabetes Family     Diabetes Maternal Grandfather     Mental illness Neg Hx        MEDICATIONS:    Current Outpatient Medications:     amLODIPine-benazepril (Jax Morales) 10-20 MG per capsule, TAKE 1 CAPSULE BY MOUTH  DAILY, Disp: 90 capsule, Rfl: 0    Bioflavonoid Products (ZULEYKA-C) 500-200-60 MG TABS, Take by mouth, Disp: , Rfl:     carvedilol (COREG CR) 20 MG 24 hr capsule, TAKE 1 CAPSULE BY MOUTH  DAILY, Disp: 90 capsule, Rfl: 0    Cinnamon 500 MG TABS, Take by mouth, Disp: , Rfl:     clotrimazole-betamethasone (LOTRISONE) 1-0 05 % cream, Apply 1 application topically 2 (two) times a day To affected area, Disp: 45 g, Rfl: 5    fluticasone (FLONASE) 50 mcg/act nasal spray, into each nostril, Disp: , Rfl:     gatifloxacin (ZYMAXID) 0 5 %, INSTILL 1 DROP INTO RIGHT EYE TWICE A DAY, Disp: , Rfl:     glipiZIDE (GLUCOTROL XL) 5 mg 24 hr tablet, Take 1 tablet (5 mg total) by mouth daily, Disp: 90 tablet, Rfl: 1    glucose blood test strip, by In Vitro route, Disp: , Rfl:     hydrocortisone 1 % cream, Apply topically 4 (four) times a day as needed for irritation, Disp: 120 g, Rfl: 0    Januvia 50 MG tablet, TAKE 1 TABLET BY MOUTH  DAILY, Disp: 90 tablet, Rfl: 0    ketorolac (ACULAR) 0 5 % ophthalmic solution, INSTILL 1 DROP INTO RIGHT EYE 4 TIMES A DAY, Disp: , Rfl:     metFORMIN (GLUCOPHAGE-XR) 500 mg 24 hr tablet, TAKE 1 TABLET BY MOUTH  DAILY WITH DINNER, Disp: 90 tablet, Rfl: 0    Multiple Vitamins-Iron (DAILY MULTIPLE VITAMIN/IRON PO), Take by mouth, Disp: , Rfl:     Omega-3 Fatty Acids (FISH OIL) 1000 MG CPDR, Take by mouth, Disp: , Rfl:     simvastatin (ZOCOR) 10 mg tablet, TAKE 1 TABLET BY MOUTH  DAILY AT BEDTIME, Disp: 90 tablet, Rfl: 3    Viibryd 20 MG tablet, TAKE 1 TABLET BY MOUTH  DAILY, Disp: 90 tablet, Rfl: 3    Lab Results:   Results for orders placed or performed in visit on 08/16/22   Olga Byrd Default   Result Value Ref Range    White Blood Cell Count 5 2 3 4 - 10 8 x10E3/uL    Red Blood Cell Count 4 69 4 14 - 5 80 x10E6/uL    Hemoglobin 14 0 13 0 - 17 7 g/dL    HCT 41 6 37 5 - 51 0 %    MCV 89 79 - 97 fL    MCH 29 9 26 6 - 33 0 pg    MCHC 33 7 31 5 - 35 7 g/dL    RDW 13 1 11 6 - 15 4 %    Platelet Count 585 927 - 450 x10E3/uL    Neutrophils 56 Not Estab  %    Lymphocytes 30 Not Estab  %    Monocytes 10 Not Estab  %    Eosinophils 3 Not Estab  %    Basophils PCT 1 Not Estab  %    Neutrophils (Absolute) 2 9 1 4 - 7 0 x10E3/uL    Lymphocytes (Absolute) 1 5 0 7 - 3 1 x10E3/uL    Monocytes (Absolute) 0 5 0 1 - 0 9 x10E3/uL    Eosinophils (Absolute) 0 1 0 0 - 0 4 x10E3/uL    Basophils ABS 0 0 0 0 - 0 2 x10E3/uL    Immature Granulocytes 0 Not Estab  %    Immature Granulocytes (Absolute) 0 0 0 0 - 0 1 [id number]   Basic metabolic panel   Result Value Ref Range    Glucose, Random 189 (H) 65 - 99 mg/dL    BUN 17 8 - 27 mg/dL    Creatinine 1 34 (H) 0 76 - 1 27 mg/dL    eGFR 52 (L) >59 mL/min/1 73    SL AMB BUN/CREATININE RATIO 13 10 - 24    Sodium 141 134 - 144 mmol/L    Potassium 4 2 3 5 - 5 2 mmol/L    Chloride 104 96 - 106 mmol/L    CO2 22 20 - 29 mmol/L    CALCIUM 9 1 8 6 - 10 2 mg/dL   Protein / creatinine ratio, urine   Result Value Ref Range    Creatinine, Urine 87 9 Not Estab  mg/dL    Total Protein, Urine 22 0 Not Estab  mg/dL    Prot/Creat Ratio, Ur 250 (H) 0 - 200 mg/g creat   Phosphorus   Result Value Ref Range    Phosphorus, Serum 3 4 2 8 - 4 1 mg/dL   Magnesium   Result Value Ref Range    Magnesium, Serum 2 3 1 6 - 2 3 mg/dL   PTH, intact   Result Value Ref Range    PTH, Intact 29 15 - 65 pg/mL

## 2022-09-14 ENCOUNTER — OFFICE VISIT (OUTPATIENT)
Dept: PODIATRY | Facility: CLINIC | Age: 87
End: 2022-09-14
Payer: COMMERCIAL

## 2022-09-14 VITALS
SYSTOLIC BLOOD PRESSURE: 144 MMHG | HEIGHT: 71 IN | WEIGHT: 229 LBS | DIASTOLIC BLOOD PRESSURE: 72 MMHG | RESPIRATION RATE: 17 BRPM | BODY MASS INDEX: 32.06 KG/M2

## 2022-09-14 DIAGNOSIS — I70.209 PERIPHERAL ARTERIOSCLEROSIS (HCC): ICD-10-CM

## 2022-09-14 DIAGNOSIS — M79.672 PAIN IN BOTH FEET: ICD-10-CM

## 2022-09-14 DIAGNOSIS — M79.671 PAIN IN BOTH FEET: ICD-10-CM

## 2022-09-14 DIAGNOSIS — E11.42 DIABETIC POLYNEUROPATHY ASSOCIATED WITH TYPE 2 DIABETES MELLITUS (HCC): Primary | ICD-10-CM

## 2022-09-14 DIAGNOSIS — L84 CORNS: ICD-10-CM

## 2022-09-14 PROCEDURE — 11056 PARNG/CUTG B9 HYPRKR LES 2-4: CPT | Performed by: PODIATRIST

## 2022-10-04 DIAGNOSIS — I10 BENIGN ESSENTIAL HYPERTENSION: ICD-10-CM

## 2022-10-04 NOTE — TELEPHONE ENCOUNTER
Pt going for his blood work and is going out of town for at least three weeks  He is requesting to have the medication refilled and will schedule appt on his return

## 2022-10-05 RX ORDER — CARVEDILOL PHOSPHATE 20 MG/1
CAPSULE, EXTENDED RELEASE ORAL
Qty: 90 CAPSULE | Refills: 3 | Status: SHIPPED | OUTPATIENT
Start: 2022-10-05

## 2022-10-18 ENCOUNTER — TELEPHONE (OUTPATIENT)
Dept: FAMILY MEDICINE CLINIC | Facility: CLINIC | Age: 87
End: 2022-10-18

## 2022-10-18 LAB
ALBUMIN SERPL-MCNC: 4.5 G/DL (ref 3.6–4.6)
ALBUMIN/CREAT UR: 54 MG/G CREAT (ref 0–29)
ALBUMIN/GLOB SERPL: 2 {RATIO} (ref 1.2–2.2)
ALP SERPL-CCNC: 64 IU/L (ref 44–121)
ALT SERPL-CCNC: 23 IU/L (ref 0–44)
AST SERPL-CCNC: 23 IU/L (ref 0–40)
BASOPHILS # BLD AUTO: 0.1 X10E3/UL (ref 0–0.2)
BASOPHILS NFR BLD AUTO: 1 %
BILIRUB SERPL-MCNC: 0.6 MG/DL (ref 0–1.2)
BUN SERPL-MCNC: 17 MG/DL (ref 8–27)
BUN/CREAT SERPL: 12 (ref 10–24)
CALCIUM SERPL-MCNC: 9 MG/DL (ref 8.6–10.2)
CHLORIDE SERPL-SCNC: 103 MMOL/L (ref 96–106)
CHOLEST SERPL-MCNC: 172 MG/DL (ref 100–199)
CO2 SERPL-SCNC: 20 MMOL/L (ref 20–29)
CREAT SERPL-MCNC: 1.4 MG/DL (ref 0.76–1.27)
CREAT UR-MCNC: 131.7 MG/DL
EGFR: 49 ML/MIN/1.73
EOSINOPHIL # BLD AUTO: 0.3 X10E3/UL (ref 0–0.4)
EOSINOPHIL NFR BLD AUTO: 4 %
ERYTHROCYTE [DISTWIDTH] IN BLOOD BY AUTOMATED COUNT: 12.8 % (ref 11.6–15.4)
GLOBULIN SER-MCNC: 2.3 G/DL (ref 1.5–4.5)
GLUCOSE SERPL-MCNC: 177 MG/DL (ref 70–99)
HBA1C MFR BLD: 7.9 % (ref 4.8–5.6)
HCT VFR BLD AUTO: 43 % (ref 37.5–51)
HDLC SERPL-MCNC: 39 MG/DL
HGB BLD-MCNC: 14.5 G/DL (ref 13–17.7)
IMM GRANULOCYTES # BLD: 0 X10E3/UL (ref 0–0.1)
IMM GRANULOCYTES NFR BLD: 0 %
LDLC SERPL CALC-MCNC: 75 MG/DL (ref 0–99)
LDLC SERPL DIRECT ASSAY-MCNC: 79 MG/DL (ref 0–99)
LYMPHOCYTES # BLD AUTO: 1.9 X10E3/UL (ref 0.7–3.1)
LYMPHOCYTES NFR BLD AUTO: 29 %
MCH RBC QN AUTO: 30.2 PG (ref 26.6–33)
MCHC RBC AUTO-ENTMCNC: 33.7 G/DL (ref 31.5–35.7)
MCV RBC AUTO: 90 FL (ref 79–97)
MICROALBUMIN UR-MCNC: 70.5 UG/ML
MICRODELETION SYND BLD/T FISH: NORMAL
MICRODELETION SYND BLD/T FISH: NORMAL
MONOCYTES # BLD AUTO: 0.7 X10E3/UL (ref 0.1–0.9)
MONOCYTES NFR BLD AUTO: 11 %
NEUTROPHILS # BLD AUTO: 3.7 X10E3/UL (ref 1.4–7)
NEUTROPHILS NFR BLD AUTO: 55 %
PLATELET # BLD AUTO: 160 X10E3/UL (ref 150–450)
POTASSIUM SERPL-SCNC: 4.1 MMOL/L (ref 3.5–5.2)
PROT SERPL-MCNC: 6.8 G/DL (ref 6–8.5)
RBC # BLD AUTO: 4.8 X10E6/UL (ref 4.14–5.8)
SL AMB VLDL CHOLESTEROL CALC: 58 MG/DL (ref 5–40)
SODIUM SERPL-SCNC: 142 MMOL/L (ref 134–144)
TRIGL SERPL-MCNC: 361 MG/DL (ref 0–149)
WBC # BLD AUTO: 6.7 X10E3/UL (ref 3.4–10.8)

## 2022-10-18 NOTE — TELEPHONE ENCOUNTER
Spoke with wife who stated pt is in [place] and she will be seeing him next week and will let him know to call and make f/u apt  Will leave message open til then

## 2022-10-27 DIAGNOSIS — E11.42 DIABETIC POLYNEUROPATHY ASSOCIATED WITH TYPE 2 DIABETES MELLITUS (HCC): ICD-10-CM

## 2022-10-27 RX ORDER — GLIPIZIDE 5 MG/1
TABLET, FILM COATED, EXTENDED RELEASE ORAL
Qty: 90 TABLET | Refills: 1 | Status: SHIPPED | OUTPATIENT
Start: 2022-10-27 | End: 2022-11-04 | Stop reason: SDUPTHER

## 2022-11-04 ENCOUNTER — OFFICE VISIT (OUTPATIENT)
Dept: FAMILY MEDICINE CLINIC | Facility: CLINIC | Age: 87
End: 2022-11-04

## 2022-11-04 VITALS
WEIGHT: 224 LBS | BODY MASS INDEX: 31.36 KG/M2 | SYSTOLIC BLOOD PRESSURE: 138 MMHG | TEMPERATURE: 97.7 F | OXYGEN SATURATION: 95 % | DIASTOLIC BLOOD PRESSURE: 70 MMHG | HEART RATE: 65 BPM | RESPIRATION RATE: 16 BRPM | HEIGHT: 71 IN

## 2022-11-04 DIAGNOSIS — Z23 NEED FOR VACCINATION: ICD-10-CM

## 2022-11-04 DIAGNOSIS — I12.9 BENIGN HYPERTENSION WITH CKD (CHRONIC KIDNEY DISEASE) STAGE III (HCC): ICD-10-CM

## 2022-11-04 DIAGNOSIS — N18.30 BENIGN HYPERTENSION WITH CKD (CHRONIC KIDNEY DISEASE) STAGE III (HCC): ICD-10-CM

## 2022-11-04 DIAGNOSIS — E78.2 MIXED HYPERLIPIDEMIA: ICD-10-CM

## 2022-11-04 DIAGNOSIS — N18.31 STAGE 3A CHRONIC KIDNEY DISEASE (HCC): ICD-10-CM

## 2022-11-04 DIAGNOSIS — E11.42 DIABETIC POLYNEUROPATHY ASSOCIATED WITH TYPE 2 DIABETES MELLITUS (HCC): Primary | ICD-10-CM

## 2022-11-04 RX ORDER — GLIPIZIDE 10 MG/1
10 TABLET, FILM COATED, EXTENDED RELEASE ORAL DAILY
Qty: 90 TABLET | Refills: 1 | Status: SHIPPED | OUTPATIENT
Start: 2022-11-04

## 2022-11-04 RX ORDER — GLIPIZIDE 10 MG/1
10 TABLET, FILM COATED, EXTENDED RELEASE ORAL DAILY
Qty: 90 TABLET | Refills: 1 | Status: SHIPPED | OUTPATIENT
Start: 2022-11-04 | End: 2022-11-04 | Stop reason: SDUPTHER

## 2022-11-04 NOTE — PROGRESS NOTES
Assessment/Plan:    1  Diabetic polyneuropathy associated with type 2 diabetes mellitus (HCC)  -     Hemoglobin A1C; Future; Expected date: 02/04/2023  -     Microalbumin / creatinine urine ratio; Future; Expected date: 02/04/2023  -     glipiZIDE (GLUCOTROL XL) 10 mg 24 hr tablet; Take 1 tablet (10 mg total) by mouth daily    2  Benign hypertension with CKD (chronic kidney disease) stage III (HCC)  -     Comprehensive metabolic panel; Future; Expected date: 02/04/2023    3  Stage 3a chronic kidney disease (HCC)  -     Comprehensive metabolic panel; Future; Expected date: 02/04/2023    4  Mixed hyperlipidemia  -     Lipid Panel with Direct LDL reflex; Future; Expected date: 02/04/2023    5   Need for vaccination  -     influenza vaccine, high-dose, PF 0 5 mL        Recent Results (from the past 2016 hour(s))   Olga Byrd Default    Collection Time: 08/16/22  9:02 AM   Result Value Ref Range    White Blood Cell Count 5 2 3 4 - 10 8 x10E3/uL    Red Blood Cell Count 4 69 4 14 - 5 80 x10E6/uL    Hemoglobin 14 0 13 0 - 17 7 g/dL    HCT 41 6 37 5 - 51 0 %    MCV 89 79 - 97 fL    MCH 29 9 26 6 - 33 0 pg    MCHC 33 7 31 5 - 35 7 g/dL    RDW 13 1 11 6 - 15 4 %    Platelet Count 110 213 - 450 x10E3/uL    Neutrophils 56 Not Estab  %    Lymphocytes 30 Not Estab  %    Monocytes 10 Not Estab  %    Eosinophils 3 Not Estab  %    Basophils PCT 1 Not Estab  %    Neutrophils (Absolute) 2 9 1 4 - 7 0 x10E3/uL    Lymphocytes (Absolute) 1 5 0 7 - 3 1 x10E3/uL    Monocytes (Absolute) 0 5 0 1 - 0 9 x10E3/uL    Eosinophils (Absolute) 0 1 0 0 - 0 4 x10E3/uL    Basophils ABS 0 0 0 0 - 0 2 x10E3/uL    Immature Granulocytes 0 Not Estab  %    Immature Granulocytes (Absolute) 0 0 0 0 - 0 1 [id number]   Basic metabolic panel    Collection Time: 08/16/22  9:02 AM   Result Value Ref Range    Glucose, Random 189 (H) 65 - 99 mg/dL    BUN 17 8 - 27 mg/dL    Creatinine 1 34 (H) 0 76 - 1 27 mg/dL    eGFR 52 (L) >59 mL/min/1 73    SL AMB BUN/CREATININE RATIO 13 10 - 24    Sodium 141 134 - 144 mmol/L    Potassium 4 2 3 5 - 5 2 mmol/L    Chloride 104 96 - 106 mmol/L    CO2 22 20 - 29 mmol/L    CALCIUM 9 1 8 6 - 10 2 mg/dL   Protein / creatinine ratio, urine    Collection Time: 08/16/22  9:02 AM   Result Value Ref Range    Creatinine, Urine 87 9 Not Estab  mg/dL    Total Protein, Urine 22 0 Not Estab  mg/dL    Prot/Creat Ratio, Ur 250 (H) 0 - 200 mg/g creat   Phosphorus    Collection Time: 08/16/22  9:02 AM   Result Value Ref Range    Phosphorus, Serum 3 4 2 8 - 4 1 mg/dL   Magnesium    Collection Time: 08/16/22  9:02 AM   Result Value Ref Range    Magnesium, Serum 2 3 1 6 - 2 3 mg/dL   PTH, intact    Collection Time: 08/16/22  9:02 AM   Result Value Ref Range    PTH, Intact 29 15 - 65 pg/mL   Valtanya Rochelle Default    Collection Time: 10/17/22  9:21 AM   Result Value Ref Range    White Blood Cell Count 6 7 3 4 - 10 8 x10E3/uL    Red Blood Cell Count 4 80 4  14 - 5 80 x10E6/uL    Hemoglobin 14 5 13 0 - 17 7 g/dL    HCT 43 0 37 5 - 51 0 %    MCV 90 79 - 97 fL    MCH 30 2 26 6 - 33 0 pg    MCHC 33 7 31 5 - 35 7 g/dL    RDW 12 8 11 6 - 15 4 %    Platelet Count 954 597 - 450 x10E3/uL    Neutrophils 55 Not Estab  %    Lymphocytes 29 Not Estab  %    Monocytes 11 Not Estab  %    Eosinophils 4 Not Estab  %    Basophils PCT 1 Not Estab  %    Neutrophils (Absolute) 3 7 1 4 - 7 0 x10E3/uL    Lymphocytes (Absolute) 1 9 0 7 - 3 1 x10E3/uL    Monocytes (Absolute) 0 7 0 1 - 0 9 x10E3/uL    Eosinophils (Absolute) 0 3 0 0 - 0 4 x10E3/uL    Basophils ABS 0 1 0 0 - 0 2 x10E3/uL    Immature Granulocytes 0 Not Estab  %    Immature Granulocytes (Absolute) 0 0 0 0 - 0 1 x10E3/uL   Comprehensive metabolic panel    Collection Time: 10/17/22  9:21 AM   Result Value Ref Range    Glucose, Random 177 (H) 70 - 99 mg/dL    BUN 17 8 - 27 mg/dL    Creatinine 1 40 (H) 0 76 - 1 27 mg/dL    eGFR 49 (L) >59 mL/min/1 73    SL AMB BUN/CREATININE RATIO 12 10 - 24    Sodium 142 134 - 144 mmol/L    Potassium 4 1 3 5 - 5 2 mmol/L    Chloride 103 96 - 106 mmol/L    CO2 20 20 - 29 mmol/L    CALCIUM 9 0 8 6 - 10 2 mg/dL    Protein, Total 6 8 6 0 - 8 5 g/dL    Albumin 4 5 3 6 - 4 6 g/dL    Globulin, Total 2 3 1 5 - 4 5 g/dL    Albumin/Globulin Ratio 2 0 1 2 - 2 2    TOTAL BILIRUBIN 0 6 0 0 - 1 2 mg/dL    Alk Phos Isoenzymes 64 44 - 121 IU/L    AST 23 0 - 40 IU/L    ALT 23 0 - 44 IU/L   Lipid panel    Collection Time: 10/17/22  9:21 AM   Result Value Ref Range    Cholesterol, Total 172 100 - 199 mg/dL    Triglycerides 361 (H) 0 - 149 mg/dL    HDL 39 (L) >39 mg/dL    VLDL Cholesterol Calculated 58 (H) 5 - 40 mg/dL    LDL Calculated 75 0 - 99 mg/dL   LDL cholesterol, direct    Collection Time: 10/17/22  9:21 AM   Result Value Ref Range    LDL Direct 79 0 - 99 mg/dL   Microalbumin / creatinine urine ratio    Collection Time: 10/17/22  9:21 AM   Result Value Ref Range    Creatinine, Urine 131 7 Not Estab  mg/dL    Microalbum  ,U,Random 70 5 Not Estab  ug/mL    Microalb/Creat Ratio 54 (H) 0 - 29 mg/g creat   [facility] Stone Panel    Collection Time: 10/17/22  9:21 AM   Result Value Ref Range    Interpretation Note    Hemoglobin A1c (w/out EAG)    Collection Time: 10/17/22  9:21 AM   Result Value Ref Range    Hemoglobin A1C 7 9 (H) 4 8 - 5 6 %   Cardiovascular Report    Collection Time: 10/17/22  9:21 AM   Result Value Ref Range    Interpretation Note          There are no Patient Instructions on file for this visit  Return in 3 months (on 2/4/2023) for Diabetes Follow-up  Subjective:      Patient ID: Erwin Rich is a 81 y o  male      Chief Complaint   Patient presents with   • Follow-up     6 month f/u-wma       Pt is here to follow up labs  Pt is feeling well      Has labs that will be pening for renal this month      The following portions of the patient's history were reviewed and updated as appropriate: allergies, current medications, past family history, past medical history, past social history, past surgical history and problem list     Review of Systems   Constitutional: Negative for activity change, appetite change, chills, diaphoresis, fatigue, fever and unexpected weight change  HENT: Negative for congestion, dental problem, ear pain, mouth sores, sinus pressure, sinus pain, sore throat and trouble swallowing  Eyes: Negative for photophobia, discharge and itching  Respiratory: Negative for apnea, chest tightness and shortness of breath  Cardiovascular: Negative for chest pain, palpitations and leg swelling  Gastrointestinal: Negative for abdominal distention, abdominal pain, blood in stool, nausea and vomiting  Endocrine: Negative for cold intolerance, heat intolerance, polydipsia, polyphagia and polyuria  Genitourinary: Negative for difficulty urinating  Musculoskeletal: Negative for arthralgias  Skin: Negative for color change and wound  Neurological: Negative for dizziness, syncope, speech difficulty and headaches  Hematological: Negative for adenopathy  Psychiatric/Behavioral: Negative for agitation and behavioral problems           Current Outpatient Medications   Medication Sig Dispense Refill   • amLODIPine-benazepril (LOTREL) 10-20 MG per capsule TAKE 1 CAPSULE BY MOUTH  DAILY 90 capsule 0   • Bioflavonoid Products (ZULEYKA-C) 500-200-60 MG TABS Take by mouth     • carvedilol (COREG CR) 20 MG 24 hr capsule TAKE 1 CAPSULE BY MOUTH  DAILY 90 capsule 3   • Cinnamon 500 MG TABS Take by mouth     • clotrimazole-betamethasone (LOTRISONE) 1-0 05 % cream Apply 1 application topically 2 (two) times a day To affected area 45 g 5   • fluticasone (FLONASE) 50 mcg/act nasal spray into each nostril     • gatifloxacin (ZYMAXID) 0 5 % INSTILL 1 DROP INTO RIGHT EYE TWICE A DAY     • glipiZIDE (GLUCOTROL XL) 10 mg 24 hr tablet Take 1 tablet (10 mg total) by mouth daily 90 tablet 1   • glucose blood test strip by In Vitro route     • hydrocortisone 1 % cream Apply topically 4 (four) times a day as needed for irritation 120 g 0   • Januvia 50 MG tablet TAKE 1 TABLET BY MOUTH  DAILY 90 tablet 0   • ketorolac (ACULAR) 0 5 % ophthalmic solution INSTILL 1 DROP INTO RIGHT EYE 4 TIMES A DAY     • metFORMIN (GLUCOPHAGE-XR) 500 mg 24 hr tablet TAKE 1 TABLET BY MOUTH  DAILY WITH DINNER 90 tablet 0   • Multiple Vitamins-Iron (DAILY MULTIPLE VITAMIN/IRON PO) Take by mouth     • Omega-3 Fatty Acids (FISH OIL) 1000 MG CPDR Take by mouth     • simvastatin (ZOCOR) 10 mg tablet TAKE 1 TABLET BY MOUTH  DAILY AT BEDTIME 90 tablet 3   • tobramycin (TOBREX) 0 3 % SOLN INSTILL 1-2 DROPS INTO AFFECTED EYE(S) EVERY 4 HOURS WHILE AWAKE     • Viibryd 20 MG tablet TAKE 1 TABLET BY MOUTH  DAILY 90 tablet 3     No current facility-administered medications for this visit  Objective:    /70   Pulse 65   Temp 97 7 °F (36 5 °C)   Resp 16   Ht 5' 11" (1 803 m)   Wt 102 kg (224 lb)   SpO2 95%   BMI 31 24 kg/m²        Physical Exam  Vitals and nursing note reviewed  Constitutional:       General: He is not in acute distress  Appearance: He is well-developed  He is not diaphoretic  HENT:      Head: Normocephalic and atraumatic  Right Ear: External ear normal       Left Ear: External ear normal       Nose: Nose normal       Mouth/Throat:      Pharynx: No oropharyngeal exudate  Eyes:      General: No scleral icterus  Right eye: No discharge  Left eye: No discharge  Pupils: Pupils are equal, round, and reactive to light  Neck:      Thyroid: No thyromegaly  Cardiovascular:      Rate and Rhythm: Normal rate  Pulses: no weak pulses          Dorsalis pedis pulses are 2+ on the right side and 2+ on the left side  Posterior tibial pulses are 2+ on the right side and 2+ on the left side  Heart sounds: Normal heart sounds  No murmur heard  Pulmonary:      Effort: Pulmonary effort is normal  No respiratory distress  Breath sounds: Normal breath sounds  No wheezing     Abdominal: General: Bowel sounds are normal  There is no distension  Palpations: Abdomen is soft  There is no mass  Tenderness: There is no abdominal tenderness  There is no guarding or rebound  Musculoskeletal:         General: Normal range of motion  Feet:      Right foot:      Skin integrity: No ulcer, skin breakdown, erythema, warmth, callus or dry skin  Left foot:      Skin integrity: No ulcer, skin breakdown, erythema, warmth, callus or dry skin  Skin:     General: Skin is warm and dry  Findings: No erythema or rash  Neurological:      Mental Status: He is alert  Coordination: Coordination normal       Deep Tendon Reflexes: Reflexes normal    Psychiatric:         Behavior: Behavior normal             Diabetic Foot Exam    Patient's shoes and socks removed  Right Foot/Ankle   Right Foot Inspection  Skin Exam: skin normal  Skin not intact, no dry skin, no warmth, no callus, no erythema, no maceration, no abnormal color, no pre-ulcer, no ulcer and no callus  Toe Exam: ROM and strength within normal limits  Sensory   Vibration: intact  Proprioception: intact  Monofilament testing: intact    Vascular  Capillary refills: < 3 seconds  The right DP pulse is 2+  The right PT pulse is 2+  Left Foot/Ankle  Left Foot Inspection  Skin Exam: skin normal  Skin not intact, no dry skin, no warmth, no erythema, no maceration, normal color, no pre-ulcer, no ulcer and no callus  Toe Exam: ROM and strength within normal limits  Sensory   Vibration: intact  Proprioception: intact  Monofilament testing: intact    Vascular  Capillary refills: < 3 seconds  The left DP pulse is 2+  The left PT pulse is 2+       Assign Risk Category  No deformity present  No loss of protective sensation  No weak pulses  Risk: 0      Micah García

## 2022-11-10 DIAGNOSIS — U07.1 COVID-19 VIRUS INFECTION: Primary | ICD-10-CM

## 2022-11-10 RX ORDER — NIRMATRELVIR AND RITONAVIR 150-100 MG
2 KIT ORAL 2 TIMES DAILY
Qty: 20 TABLET | Refills: 0 | Status: SHIPPED | OUTPATIENT
Start: 2022-11-10 | End: 2022-11-15

## 2022-11-17 DIAGNOSIS — E78.2 MIXED HYPERLIPIDEMIA: ICD-10-CM

## 2022-11-17 DIAGNOSIS — E11.42 DIABETIC POLYNEUROPATHY ASSOCIATED WITH TYPE 2 DIABETES MELLITUS (HCC): ICD-10-CM

## 2022-11-17 RX ORDER — SIMVASTATIN 10 MG
TABLET ORAL
Qty: 90 TABLET | Refills: 1 | Status: SHIPPED | OUTPATIENT
Start: 2022-11-17

## 2022-11-23 ENCOUNTER — OFFICE VISIT (OUTPATIENT)
Dept: PODIATRY | Facility: CLINIC | Age: 87
End: 2022-11-23

## 2022-11-23 VITALS
WEIGHT: 224 LBS | RESPIRATION RATE: 17 BRPM | DIASTOLIC BLOOD PRESSURE: 70 MMHG | SYSTOLIC BLOOD PRESSURE: 138 MMHG | BODY MASS INDEX: 31.36 KG/M2 | HEIGHT: 71 IN

## 2022-11-23 DIAGNOSIS — I10 BENIGN ESSENTIAL HYPERTENSION: ICD-10-CM

## 2022-11-23 DIAGNOSIS — I70.209 PERIPHERAL ARTERIOSCLEROSIS (HCC): ICD-10-CM

## 2022-11-23 DIAGNOSIS — E11.42 DIABETIC POLYNEUROPATHY ASSOCIATED WITH TYPE 2 DIABETES MELLITUS (HCC): ICD-10-CM

## 2022-11-23 DIAGNOSIS — E11.42 DIABETIC POLYNEUROPATHY ASSOCIATED WITH TYPE 2 DIABETES MELLITUS (HCC): Primary | ICD-10-CM

## 2022-11-23 DIAGNOSIS — M79.671 PAIN IN BOTH FEET: ICD-10-CM

## 2022-11-23 DIAGNOSIS — L84 CORNS: ICD-10-CM

## 2022-11-23 DIAGNOSIS — M79.672 PAIN IN BOTH FEET: ICD-10-CM

## 2022-11-23 RX ORDER — SITAGLIPTIN 50 MG/1
TABLET, FILM COATED ORAL
Qty: 90 TABLET | Refills: 1 | Status: SHIPPED | OUTPATIENT
Start: 2022-11-23

## 2022-11-23 RX ORDER — METFORMIN HYDROCHLORIDE 500 MG/1
TABLET, EXTENDED RELEASE ORAL
Qty: 90 TABLET | Refills: 1 | Status: SHIPPED | OUTPATIENT
Start: 2022-11-23

## 2022-11-23 RX ORDER — AMLODIPINE BESYLATE AND BENAZEPRIL HYDROCHLORIDE 10; 20 MG/1; MG/1
1 CAPSULE ORAL DAILY
Qty: 90 CAPSULE | Refills: 1 | Status: SHIPPED | OUTPATIENT
Start: 2022-11-23

## 2023-01-25 ENCOUNTER — OFFICE VISIT (OUTPATIENT)
Dept: PODIATRY | Facility: CLINIC | Age: 88
End: 2023-01-25

## 2023-01-25 VITALS
HEIGHT: 71 IN | SYSTOLIC BLOOD PRESSURE: 138 MMHG | BODY MASS INDEX: 31.36 KG/M2 | WEIGHT: 224 LBS | RESPIRATION RATE: 17 BRPM | DIASTOLIC BLOOD PRESSURE: 70 MMHG

## 2023-01-25 DIAGNOSIS — M79.672 PAIN IN BOTH FEET: ICD-10-CM

## 2023-01-25 DIAGNOSIS — E11.42 DIABETIC POLYNEUROPATHY ASSOCIATED WITH TYPE 2 DIABETES MELLITUS (HCC): Primary | ICD-10-CM

## 2023-01-25 DIAGNOSIS — M79.671 PAIN IN BOTH FEET: ICD-10-CM

## 2023-01-25 DIAGNOSIS — L84 CORNS: ICD-10-CM

## 2023-01-25 DIAGNOSIS — I70.209 PERIPHERAL ARTERIOSCLEROSIS (HCC): ICD-10-CM

## 2023-01-31 DIAGNOSIS — I10 BENIGN ESSENTIAL HYPERTENSION: ICD-10-CM

## 2023-01-31 RX ORDER — AMLODIPINE BESYLATE AND BENAZEPRIL HYDROCHLORIDE 10; 20 MG/1; MG/1
1 CAPSULE ORAL DAILY
Qty: 90 CAPSULE | Refills: 1 | Status: SHIPPED | OUTPATIENT
Start: 2023-01-31 | End: 2023-04-25

## 2023-02-06 ENCOUNTER — TELEPHONE (OUTPATIENT)
Dept: NEPHROLOGY | Facility: CLINIC | Age: 88
End: 2023-02-06

## 2023-02-06 NOTE — TELEPHONE ENCOUNTER
Called and spoke with Patient to complete their bloodwork prior to their appointment on 02/14/23 with Dr Chapo Mata at the [facility] [place]

## 2023-02-09 LAB
ALBUMIN SERPL-MCNC: 4.4 G/DL (ref 3.6–4.6)
ALBUMIN/CREAT UR: 108 MG/G CREAT (ref 0–29)
ALBUMIN/GLOB SERPL: 1.8 {RATIO} (ref 1.2–2.2)
ALP SERPL-CCNC: 58 IU/L (ref 44–121)
ALT SERPL-CCNC: 21 IU/L (ref 0–44)
AST SERPL-CCNC: 19 IU/L (ref 0–40)
BILIRUB SERPL-MCNC: 0.6 MG/DL (ref 0–1.2)
BUN SERPL-MCNC: 15 MG/DL (ref 8–27)
BUN/CREAT SERPL: 11 (ref 10–24)
CALCIUM SERPL-MCNC: 9.2 MG/DL (ref 8.6–10.2)
CHLORIDE SERPL-SCNC: 104 MMOL/L (ref 96–106)
CHOLEST SERPL-MCNC: 165 MG/DL (ref 100–199)
CO2 SERPL-SCNC: 23 MMOL/L (ref 20–29)
CREAT SERPL-MCNC: 1.32 MG/DL (ref 0.76–1.27)
CREAT UR-MCNC: 101.2 MG/DL
EGFR: 53 ML/MIN/1.73
GLOBULIN SER-MCNC: 2.5 G/DL (ref 1.5–4.5)
GLUCOSE SERPL-MCNC: 207 MG/DL (ref 70–99)
HBA1C MFR BLD: 8.1 % (ref 4.8–5.6)
HDLC SERPL-MCNC: 40 MG/DL
LDLC SERPL CALC-MCNC: 72 MG/DL (ref 0–99)
MICROALBUMIN UR-MCNC: 108.8 UG/ML
MICRODELETION SYND BLD/T FISH: NORMAL
MICRODELETION SYND BLD/T FISH: NORMAL
POTASSIUM SERPL-SCNC: 4.2 MMOL/L (ref 3.5–5.2)
PROT SERPL-MCNC: 6.9 G/DL (ref 6–8.5)
SODIUM SERPL-SCNC: 142 MMOL/L (ref 134–144)
TRIGL SERPL-MCNC: 334 MG/DL (ref 0–149)

## 2023-02-10 ENCOUNTER — TELEPHONE (OUTPATIENT)
Dept: FAMILY MEDICINE CLINIC | Facility: CLINIC | Age: 88
End: 2023-02-10

## 2023-02-13 ENCOUNTER — TELEPHONE (OUTPATIENT)
Dept: NEPHROLOGY | Facility: CLINIC | Age: 88
End: 2023-02-13

## 2023-02-13 ENCOUNTER — RA CDI HCC (OUTPATIENT)
Dept: OTHER | Facility: HOSPITAL | Age: 88
End: 2023-02-13

## 2023-02-13 NOTE — TELEPHONE ENCOUNTER
Appointment Confirmation   Person confirmed appointment with  If not patient, name of the person Spouse    Date and time of appointment 4 2/14   Patient acknowledged and will be at appointment? yes    Did you advise the patient that they will need a urine sample if they are a new patient?  N/A    Did you advise the patient to bring their current medications for verification? (including any OTC) N/A    Additional Information  Pt e checked in

## 2023-02-13 NOTE — PROGRESS NOTES
[facility]  coding opportunities          Chart Reviewed number of suggestions sent to Provider: 5   E11 [id number]  E11 51  E11 29, R80 9  E11 22, N18 31      Patients Insurance     Medicare Insurance: [employer] Ad[employer]age

## 2023-02-14 ENCOUNTER — OFFICE VISIT (OUTPATIENT)
Dept: NEPHROLOGY | Facility: CLINIC | Age: 88
End: 2023-02-14

## 2023-02-14 VITALS
BODY MASS INDEX: 31.78 KG/M2 | SYSTOLIC BLOOD PRESSURE: 142 MMHG | DIASTOLIC BLOOD PRESSURE: 76 MMHG | HEIGHT: 71 IN | WEIGHT: 227 LBS

## 2023-02-14 DIAGNOSIS — R80.9 MICROALBUMINURIA: ICD-10-CM

## 2023-02-14 DIAGNOSIS — N18.31 STAGE 3A CHRONIC KIDNEY DISEASE (HCC): ICD-10-CM

## 2023-02-14 DIAGNOSIS — N18.30 BENIGN HYPERTENSION WITH CKD (CHRONIC KIDNEY DISEASE) STAGE III (HCC): Primary | ICD-10-CM

## 2023-02-14 DIAGNOSIS — I12.9 BENIGN HYPERTENSION WITH CKD (CHRONIC KIDNEY DISEASE) STAGE III (HCC): Primary | ICD-10-CM

## 2023-02-14 NOTE — PROGRESS NOTES
NEPHROLOGY OUTPATIENT PROGRESS NOTE   Davy Cancino 81 y o  male MRN: [id number]  DATE: 2/14/2023  Reason for visit:   Chief Complaint   Patient presents with   • Follow-up   • Chronic Kidney Disease     ASSESSMENT and PLAN:  CKD stage IIIA, baseline creatinine 1 3 to 1 4 going back to 2018, 1 2 going back to 2016  -last creatinine 1 3 stable in February 2023   -CKD suspect in the setting of long-term hypertension causing hypertensive arteriosclerosis, diabetes, age-related nephron loss  -UA bland in May 2021    -if creatinine worsens, consider renal ultrasound in the future  -avoid nephrotoxins or NSAIDs  -advised to drink more free water to stay hydrated     Microalbuminuria, last  UACR 108 mg in February 2023 again slightly increased    -this could be in the setting of underlying hypertension, diabetes  -currently remains on benazepril 20 mg daily   May consider increasing to 30 mg daily if worsening proteinuria  -last hemoglobin A1c  8 1 in February 2023  Needs better control of blood glucose      Hypertension  -blood pressure slightly above goal in the office today   -Currently remains on amlodipine/benazepril 10/20 mg daily, Coreg 20 mg daily   -Check BP at home and call back if remains persistently greater than 140/90  Consider increasing benazepril by adding additional 10 mg if BP remains higher  He has upcoming PCP appointment later this week as well     -salt restricted diet recommended    Diagnoses and all orders for this visit:    Benign hypertension with CKD (chronic kidney disease) stage III ([address] )  -     Basic metabolic panel; Future  -     Phosphorus; Future  -     PTH, intact; Future  -     CBC; Future  -     Microalbumin / creatinine urine ratio; Future  -     Vitamin D 25 hydroxy; Future    Stage 3a chronic kidney disease (HCC)  -     Basic metabolic panel; Future  -     Phosphorus; Future  -     PTH, intact; Future  -     CBC; Future  -     Microalbumin / creatinine urine ratio;  Future  - Vitamin D 25 hydroxy; Future    Microalbuminuria  -     Microalbumin / creatinine urine ratio; Future        SUBJECTIVE / HPI:  Helen JACOB 19 y o  year old male with medical issues of hypertension for 26 years, diabetes for 11 years, hyperlipidemia, CKD stage 3 with baseline creatinine 1 3 to 1 4 since 2018, prior 1 2 going back to 2016 who presents for regular follow-up of CKD   Last serum creatinine stable at baseline   He overall feels well but tired  Patient's initial baseline creatinine 1 2 going back to 2016 although since 2018 seems to be around 1 3 to 1 4  Denies any urinary complaint   No recent NSAID exposure   Denies chest pain, shortness of breath, nausea, vomiting  REVIEW OF SYSTEMS:  More than 10 point review of systems were obtained and discussed in length with the patient  Complete review of systems were negative / unremarkable except mentioned above  PHYSICAL EXAM:  Vitals:    02/14/23 1610 02/14/23 1629   BP: 134/70 142/76   BP Location: Left arm    Patient Position: Sitting    Cuff Size: Large    Weight: 103 kg (227 lb)    Height: 5' 11" (1 803 m)      Body mass index is 31 66 kg/m²  Physical Exam  Vitals reviewed  Constitutional:       Appearance: He is well-developed  HENT:      Head: Normocephalic and atraumatic  Right Ear: External ear normal       Left Ear: External ear normal    Eyes:      General: No scleral icterus  Conjunctiva/sclera: Conjunctivae normal    Cardiovascular:      Comments: No significant edema in both legs  Pulmonary:      Effort: Pulmonary effort is normal  No respiratory distress  Breath sounds: Normal breath sounds  No wheezing or rales  Abdominal:      General: Bowel sounds are normal  There is no distension  Palpations: Abdomen is soft  Tenderness: There is no abdominal tenderness  Musculoskeletal:         General: No deformity  Lymphadenopathy:      Cervical: No cervical adenopathy  Skin:     Findings: No rash  Neurological:      Mental Status: He is alert and oriented to person, place, and time  Psychiatric:         Behavior: Behavior normal          PAST MEDICAL HISTORY:  Past Medical History:   Diagnosis Date   • Ascending cholangitis     Last assessed 1/22/2015    • Atherosclerosis of arteries of extremities ([facility] [address] )     Resolved 12/19/2016    • Cancer ([facility] [address] )     basal cell on  left shoulder and left wrist   • Chronic kidney disease     stage 3   • Diabetes mellitus ([facility] [address] )    • Afognak (hard of hearing)    • Hyperlipidemia    • Hypertension    • Seasonal allergies    • Wears hearing aid in both ears        PAST SURGICAL HISTORY:  Past Surgical History:   Procedure Laterality Date   • APPENDECTOMY      Last assessed 1/22/2015    • CHOLECYSTECTOMY      Last assessed 1/22/2015    • FRACTURE SURGERY      Open treatment of fracture of the radial shaft  Last assessed 1/22/2015    • WV XCAPSL CTRC RMVL INSJ IO LENS PROSTH W/O ECP Right 2/7/2022    Procedure: EXTRACTION EXTRACAPSULAR CATARACT PHACO INTRAOCULAR LENS (IOL);   Surgeon: Helena Garcia MD;  Location: [facility] MAIN OR;  Service: Ophthalmology   • TONSILLECTOMY      Last assessed 1/22/2015        SOCIAL HISTORY:  Social History     Substance and Sexual Activity   Alcohol Use Yes    Comment: rare     Social History     Substance and Sexual Activity   Drug Use Never     Social History     Tobacco Use   Smoking Status Never   Smokeless Tobacco Never       FAMILY HISTORY:  Family History   Problem Relation Age of Onset   • Hypertension Father    • Other Father         Gastric ulcer    • Heart disease Mother    • Hypertension Mother    • Liver cancer Mother    • Melanoma Mother    • Diabetes Brother    • Diabetes Family    • Diabetes Maternal Grandfather    • Mental illness Neg Hx        MEDICATIONS:    Current Outpatient Medications:   •  amLODIPine-benazepril (LOTREL) 10-20 MG per capsule, Take 1 capsule by mouth daily, Disp: 90 capsule, Rfl: 1  •  Bioflavonoid Products (ZULEYKA-C) 500-200-60 MG TABS, Take by mouth, Disp: , Rfl:   •  carvedilol (COREG CR) 20 MG 24 hr capsule, TAKE 1 CAPSULE BY MOUTH  DAILY, Disp: 90 capsule, Rfl: 3  •  Cinnamon 500 MG TABS, Take by mouth, Disp: , Rfl:   •  clotrimazole-betamethasone (LOTRISONE) 1-0 05 % cream, Apply 1 application topically 2 (two) times a day To affected area, Disp: 45 g, Rfl: 5  •  fluticasone (FLONASE) 50 mcg/act nasal spray, into each nostril, Disp: , Rfl:   •  gatifloxacin (ZYMAXID) 0 5 %, INSTILL 1 DROP INTO RIGHT EYE TWICE A DAY, Disp: , Rfl:   •  glipiZIDE (GLUCOTROL XL) 10 mg 24 hr tablet, Take 1 tablet (10 mg total) by mouth daily, Disp: 90 tablet, Rfl: 1  •  glucose blood test strip, by In Vitro route, Disp: , Rfl:   •  hydrocortisone 1 % cream, Apply topically 4 (four) times a day as needed for irritation, Disp: 120 g, Rfl: 0  •  Januvia 50 MG tablet, TAKE 1 TABLET BY MOUTH  DAILY, Disp: 90 tablet, Rfl: 1  •  ketorolac (ACULAR) 0 5 % ophthalmic solution, INSTILL 1 DROP INTO RIGHT EYE 4 TIMES A DAY, Disp: , Rfl:   •  metFORMIN (GLUCOPHAGE-XR) 500 mg 24 hr tablet, TAKE 1 TABLET BY MOUTH  DAILY WITH DINNER, Disp: 90 tablet, Rfl: 1  •  Multiple Vitamins-Iron (DAILY MULTIPLE VITAMIN/IRON PO), Take by mouth, Disp: , Rfl:   •  Omega-3 Fatty Acids (FISH OIL) 1000 MG CPDR, Take by mouth, Disp: , Rfl:   •  simvastatin (ZOCOR) 10 mg tablet, TAKE 1 TABLET BY MOUTH  DAILY AT BEDTIME, Disp: 90 tablet, Rfl: 1  •  tobramycin (TOBREX) 0 3 % SOLN, INSTILL 1-2 DROPS INTO AFFECTED EYE(S) EVERY 4 HOURS WHILE AWAKE, Disp: , Rfl:   •  Viibryd 20 MG tablet, TAKE 1 TABLET BY MOUTH  DAILY, Disp: 90 tablet, Rfl: 3    Lab Results:   Results for orders placed or performed in visit on 02/08/23   Comprehensive metabolic panel   Result Value Ref Range    Glucose, Random 207 (H) 70 - 99 mg/dL    BUN 15 8 - 27 mg/dL    Creatinine 1 32 (H) 0 76 - 1 27 mg/dL    eGFR 53 (L) >59 mL/min/1 73    SL AMB BUN/CREATININE RATIO 11 10 - 24    Sodium 142 134 - 144 mmol/L    Potassium 4 2 3 5 - 5 2 mmol/L    Chloride 104 96 - 106 mmol/L    CO2 23 20 - 29 mmol/L    CALCIUM 9 2 8 6 - 10 2 mg/dL    Protein, Total 6 9 6 0 - 8 5 g/dL    Albumin 4 4 3 6 - 4 6 g/dL    Globulin, Total 2 5 1 5 - 4 5 g/dL    Albumin/Globulin Ratio 1 8 1 2 - 2 2    TOTAL BILIRUBIN 0 6 0 0 - 1 2 mg/dL    Alk Phos Isoenzymes 58 44 - 121 IU/L    AST 19 0 - 40 IU/L    ALT 21 0 - 44 IU/L   Lipid panel   Result Value Ref Range    Cholesterol, Total 165 100 - 199 mg/dL    Triglycerides 334 (H) 0 - 149 mg/dL    HDL 40 >39 mg/dL    LDL Calculated 72 0 - 99 mg/dL   Microalbumin / creatinine urine ratio   Result Value Ref Range    Creatinine, Urine 101 2 Not Estab  mg/dL    Microalbum  ,U,Random 108 8 Not Estab  ug/mL    Microalb/Creat Ratio 108 (H) 0 - 29 mg/g creat   [facility] Kidney Stone Panel   Result Value Ref Range    Interpretation Note    Hemoglobin A1c (w/out EAG)   Result Value Ref Range    Hemoglobin A1C 8 1 (H) 4 8 - 5 6 %   Cardiovascular Report   Result Value Ref Range    Interpretation Note

## 2023-02-17 ENCOUNTER — OFFICE VISIT (OUTPATIENT)
Dept: FAMILY MEDICINE CLINIC | Facility: CLINIC | Age: 88
End: 2023-02-17

## 2023-02-17 VITALS
OXYGEN SATURATION: 97 % | WEIGHT: 224 LBS | HEART RATE: 72 BPM | TEMPERATURE: 97.5 F | BODY MASS INDEX: 31.36 KG/M2 | RESPIRATION RATE: 16 BRPM | SYSTOLIC BLOOD PRESSURE: 140 MMHG | HEIGHT: 71 IN | DIASTOLIC BLOOD PRESSURE: 80 MMHG

## 2023-02-17 DIAGNOSIS — E11.42 DIABETIC POLYNEUROPATHY ASSOCIATED WITH TYPE 2 DIABETES MELLITUS (HCC): Primary | ICD-10-CM

## 2023-02-17 DIAGNOSIS — E78.2 MIXED HYPERLIPIDEMIA: ICD-10-CM

## 2023-02-17 DIAGNOSIS — N18.30 BENIGN HYPERTENSION WITH CKD (CHRONIC KIDNEY DISEASE) STAGE III (HCC): ICD-10-CM

## 2023-02-17 DIAGNOSIS — N18.31 STAGE 3A CHRONIC KIDNEY DISEASE (HCC): ICD-10-CM

## 2023-02-17 DIAGNOSIS — I12.9 BENIGN HYPERTENSION WITH CKD (CHRONIC KIDNEY DISEASE) STAGE III (HCC): ICD-10-CM

## 2023-02-17 RX ORDER — REPAGLINIDE 0.5 MG/1
0.5 TABLET ORAL
Qty: 120 TABLET | Refills: 1 | Status: SHIPPED | OUTPATIENT
Start: 2023-02-17

## 2023-02-17 NOTE — PROGRESS NOTES
Assessment/Plan:    1  Diabetic polyneuropathy associated with type 2 diabetes mellitus (HCC)  -     repaglinide (PRANDIN) 0 5 mg tablet; Take 1 tablet (0 5 mg total) by mouth 2 (two) times a day before meals  -     Hemoglobin A1C; Future; Expected date: 05/17/2023  -     Comprehensive metabolic panel; Future; Expected date: 05/17/2023  -     CBC and Platelet; Future; Expected date: 05/17/2023    2  Benign hypertension with CKD (chronic kidney disease) stage III (HCC)  -     Hemoglobin A1C; Future; Expected date: 05/17/2023  -     Comprehensive metabolic panel; Future; Expected date: 05/17/2023  -     CBC and Platelet; Future; Expected date: 05/17/2023    3  Stage 3a chronic kidney disease (HCC)  -     Hemoglobin A1C; Future; Expected date: 05/17/2023  -     Comprehensive metabolic panel; Future; Expected date: 05/17/2023  -     CBC and Platelet; Future; Expected date: 05/17/2023    4  Mixed hyperlipidemia  -     Hemoglobin A1C; Future; Expected date: 05/17/2023  -     Comprehensive metabolic panel; Future; Expected date: 05/17/2023  -     CBC and Platelet; Future; Expected date: 05/17/2023          There are no Patient Instructions on file for this visit  Return in 3 months (on 5/17/2023) for Diabetes Follow-up  Subjective:      Patient ID: Rigo Goodman is a 81 y o  male  Chief Complaint   Patient presents with   • Follow-up     [facility]       Pt is here for a follow up of labs  Recently seen by renal         The following portions of the patient's history were reviewed and updated as appropriate: allergies, current medications, past family history, past medical history, past social history, past surgical history and problem list     Review of Systems   Constitutional: Negative for activity change, appetite change, chills, diaphoresis, fatigue, fever and unexpected weight change     HENT: Negative for congestion, dental problem, ear pain, mouth sores, sinus pressure, sinus pain, sore throat and trouble swallowing  Eyes: Negative for photophobia, discharge and itching  Respiratory: Negative for apnea, chest tightness and shortness of breath  Cardiovascular: Negative for chest pain, palpitations and leg swelling  Gastrointestinal: Negative for abdominal distention, abdominal pain, blood in stool, nausea and vomiting  Endocrine: Negative for cold intolerance, heat intolerance, polydipsia, polyphagia and polyuria  Genitourinary: Negative for difficulty urinating  Musculoskeletal: Negative for arthralgias  Skin: Negative for color change and wound  Neurological: Negative for dizziness, syncope, speech difficulty and headaches  Hematological: Negative for adenopathy  Psychiatric/Behavioral: Negative for agitation and behavioral problems           Current Outpatient Medications   Medication Sig Dispense Refill   • amLODIPine-benazepril (LOTREL) 10-20 MG per capsule Take 1 capsule by mouth daily 90 capsule 1   • Bioflavonoid Products (ZULEYKA-C) 500-200-60 MG TABS Take by mouth     • carvedilol (COREG CR) 20 MG 24 hr capsule TAKE 1 CAPSULE BY MOUTH  DAILY 90 capsule 3   • Cinnamon 500 MG TABS Take by mouth     • clotrimazole-betamethasone (LOTRISONE) 1-0 05 % cream Apply 1 application topically 2 (two) times a day To affected area 45 g 5   • fluticasone (FLONASE) 50 mcg/act nasal spray into each nostril     • gatifloxacin (ZYMAXID) 0 5 % INSTILL 1 DROP INTO RIGHT EYE TWICE A DAY     • glipiZIDE (GLUCOTROL XL) 10 mg 24 hr tablet Take 1 tablet (10 mg total) by mouth daily 90 tablet 1   • glucose blood test strip by In Vitro route     • hydrocortisone 1 % cream Apply topically 4 (four) times a day as needed for irritation 120 g 0   • Januvia 50 MG tablet TAKE 1 TABLET BY MOUTH  DAILY 90 tablet 1   • ketorolac (ACULAR) 0 5 % ophthalmic solution INSTILL 1 DROP INTO RIGHT EYE 4 TIMES A DAY     • metFORMIN (GLUCOPHAGE-XR) 500 mg 24 hr tablet TAKE 1 TABLET BY MOUTH  DAILY WITH DINNER 90 tablet 1   • Multiple Vitamins-Iron (DAILY MULTIPLE VITAMIN/IRON PO) Take by mouth     • Omega-3 Fatty Acids (FISH OIL) 1000 MG CPDR Take by mouth     • repaglinide (PRANDIN) 0 5 mg tablet Take 1 tablet (0 5 mg total) by mouth 2 (two) times a day before meals 120 tablet 1   • simvastatin (ZOCOR) 10 mg tablet TAKE 1 TABLET BY MOUTH  DAILY AT BEDTIME 90 tablet 1   • tobramycin (TOBREX) 0 3 % SOLN INSTILL 1-2 DROPS INTO AFFECTED EYE(S) EVERY 4 HOURS WHILE AWAKE     • Viibryd 20 MG tablet TAKE 1 TABLET BY MOUTH  DAILY 90 tablet 3     No current facility-administered medications for this visit  Objective:    /80 (BP Location: Left arm, Patient Position: Sitting, Cuff Size: Large)   Pulse 72   Temp 97 5 °F (36 4 °C) (Temporal)   Resp 16   Ht 5' 11" (1 803 m)   Wt 102 kg (224 lb)   SpO2 97%   BMI 31 24 kg/m²        Physical Exam  Vitals and nursing note reviewed  Constitutional:       General: He is not in acute distress  Appearance: He is well-developed  He is not diaphoretic  HENT:      Head: Normocephalic and atraumatic  Right Ear: External ear normal       Left Ear: External ear normal       Nose: Nose normal       Mouth/Throat:      Pharynx: No oropharyngeal exudate  Eyes:      General: No scleral icterus  Right eye: No discharge  Left eye: No discharge  Pupils: Pupils are equal, round, and reactive to light  Neck:      Thyroid: No thyromegaly  Cardiovascular:      Rate and Rhythm: Normal rate  Heart sounds: Normal heart sounds  No murmur heard  Pulmonary:      Effort: Pulmonary effort is normal  No respiratory distress  Breath sounds: Normal breath sounds  No wheezing  Abdominal:      General: Bowel sounds are normal  There is no distension  Palpations: Abdomen is soft  There is no mass  Tenderness: There is no abdominal tenderness  There is no guarding or rebound  Musculoskeletal:         General: Normal range of motion     Skin:     General: Skin is warm and dry  Findings: No erythema or rash  Neurological:      Mental Status: He is alert  Coordination: Coordination normal       Deep Tendon Reflexes: Reflexes normal    Psychiatric:         Behavior: Behavior normal            Recent Results (from the past 672 hour(s))   Comprehensive metabolic panel    Collection Time: 02/08/23  9:02 AM   Result Value Ref Range    Glucose, Random 207 (H) 70 - 99 mg/dL    BUN 15 8 - 27 mg/dL    Creatinine 1 32 (H) 0 76 - 1 27 mg/dL    eGFR 53 (L) >59 mL/min/1 73    SL AMB BUN/CREATININE RATIO 11 10 - 24    Sodium 142 134 - 144 mmol/L    Potassium 4 2 3 5 - 5 2 mmol/L    Chloride 104 96 - 106 mmol/L    CO2 23 20 - 29 mmol/L    CALCIUM 9 2 8 6 - 10 2 mg/dL    Protein, Total 6 9 6 0 - 8 5 g/dL    Albumin 4 4 3 6 - 4 6 g/dL    Globulin, Total 2 5 1 5 - 4 5 g/dL    Albumin/Globulin Ratio 1 8 1 2 - 2 2    TOTAL BILIRUBIN 0 6 0 0 - 1 2 mg/dL    Alk Phos Isoenzymes 58 44 - 121 IU/L    AST 19 0 - 40 IU/L    ALT 21 0 - 44 IU/L   Lipid panel    Collection Time: 02/08/23  9:02 AM   Result Value Ref Range    Cholesterol, Total 165 100 - 199 mg/dL    Triglycerides 334 (H) 0 - 149 mg/dL    HDL 40 >39 mg/dL    LDL Calculated 72 0 - 99 mg/dL   Microalbumin / creatinine urine ratio    Collection Time: 02/08/23  9:02 AM   Result Value Ref Range    Creatinine, Urine 101 2 Not Estab  mg/dL    Microalbum  ,U,Random 108 8 Not Estab  ug/mL    Microalb/Creat Ratio 108 (H) 0 - 29 mg/g creat   [facility] Kidney Stone Panel    Collection Time: 02/08/23  9:02 AM   Result Value Ref Range    Interpretation Note    Hemoglobin A1c (w/out EAG)    Collection Time: 02/08/23  9:02 AM   Result Value Ref Range    Hemoglobin A1C 8 1 (H) 4 8 - 5 6 %   Cardiovascular Report    Collection Time: 02/08/23  9:02 AM   Result Value Ref Range    Interpretation Note          Benita Groves DO

## 2023-02-21 DIAGNOSIS — E11.42 DIABETIC POLYNEUROPATHY ASSOCIATED WITH TYPE 2 DIABETES MELLITUS (HCC): ICD-10-CM

## 2023-02-21 RX ORDER — REPAGLINIDE 0.5 MG/1
0.5 TABLET ORAL
Qty: 120 TABLET | Refills: 1 | OUTPATIENT
Start: 2023-02-21

## 2023-03-06 LAB
LEFT EYE DIABETIC RETINOPATHY: NORMAL
RIGHT EYE DIABETIC RETINOPATHY: NORMAL
SEVERITY (EYE EXAM): NORMAL

## 2023-03-22 DIAGNOSIS — E11.42 DIABETIC POLYNEUROPATHY ASSOCIATED WITH TYPE 2 DIABETES MELLITUS (HCC): ICD-10-CM

## 2023-03-22 RX ORDER — GLIPIZIDE 10 MG/1
TABLET, FILM COATED, EXTENDED RELEASE ORAL
Qty: 90 TABLET | Refills: 3 | Status: SHIPPED | OUTPATIENT
Start: 2023-03-22

## 2023-04-05 DIAGNOSIS — E11.42 DIABETIC POLYNEUROPATHY ASSOCIATED WITH TYPE 2 DIABETES MELLITUS (HCC): ICD-10-CM

## 2023-04-05 RX ORDER — REPAGLINIDE 0.5 MG/1
TABLET ORAL
Qty: 180 TABLET | Refills: 3 | Status: SHIPPED | OUTPATIENT
Start: 2023-04-05

## 2023-04-12 PROBLEM — R93.0 ABNORMAL CT OF THE HEAD: Status: ACTIVE | Noted: 2023-04-12

## 2023-04-25 ENCOUNTER — EVALUATION (OUTPATIENT)
Dept: PHYSICAL THERAPY | Facility: CLINIC | Age: 88
End: 2023-04-25

## 2023-04-25 VITALS — DIASTOLIC BLOOD PRESSURE: 76 MMHG | SYSTOLIC BLOOD PRESSURE: 150 MMHG | HEART RATE: 67 BPM
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
## 2023-04-25 DIAGNOSIS — R26.9 GAIT ABNORMALITY: ICD-10-CM

## 2023-04-25 DIAGNOSIS — I10 HYPERTENSION, UNSPECIFIED TYPE: Primary | ICD-10-CM

## 2023-04-25 RX ORDER — AMLODIPINE BESYLATE AND BENAZEPRIL HYDROCHLORIDE 5; 10 MG/1; MG/1
1 CAPSULE ORAL DAILY
Qty: 90 CAPSULE | Refills: 1 | Status: SHIPPED | OUTPATIENT
Start: 2023-04-25

## 2023-04-25 NOTE — PROGRESS NOTES
PT Evaluation     Today's date: 2023  Patient name: Julisa Agarwal  : 1935  MRN: [id number]  Referring provider: Yaritza Coffey DO  Dx:   Encounter Diagnosis     ICD-10-CM    1  Fall, subsequent encounter  W19  XXXD Ambulatory referral to Physical Therapy     Ambulatory referral to Occupational Therapy      2  Gait abnormality  R26 9                      Assessment  Assessment details: Pt presents with signs and symptoms synonymous of generalized gait dysfunction, subjective and objective risk for falls and it was evident that he is experiencing orthostatic hypotension  First trial was evident for both systolic and diastolic, second trial was only evident in diastolic  Discussed with patient and his wife about continuing to work with medication vs visiting with cardiologist, family was in accord  He did not display any inner ear dysfunction during provocation testing during today's assessment aside from decreased VOR Speed  Pt presents with pain likely residual bruising from fall, decreased CS range, flexibility, as well as tolerance to activity, fall risk, gait dysfunction and postural awareness  Pt would benefit skilled PT intervention in order to address these impairments in order to be able to perform all desired activities with minimal to nil symptom exacerbation    Thank you very much for this kind referral      Impairments: abnormal gait, abnormal or restricted ROM, activity intolerance, impaired balance, impaired physical strength, lacks appropriate home exercise program, pain with function, poor posture  and poor body mechanics  Understanding of Dx/Px/POC: good   Prognosis: good    Goals  STG 4 Weeks:  Decrease pain at worst to 4  Improve range to CS Range to Mod  Improve VOR speed to 1 5  Improve walking endurance to 20 mins  Independent with HEP  LTG 8 Weeks:  Decrease pain at worst to 2  Improve range to CS range to min  Improve VOR Speed 2  Improve walking endurance to 35  Able to perform all desired activities with minimal to nil symptom exacerbation      Plan  Plan details: Javier Redman is Wife  Patient would benefit from: skilled physical therapy  Planned modality interventions: cryotherapy and thermotherapy: hydrocollator packs  Planned therapy interventions: abdominal trunk stabilization, joint mobilization, manual therapy, neuromuscular re-education, patient education, postural training, strengthening, stretching, therapeutic activities, therapeutic exercise, therapeutic training, transfer training, home exercise program, graded motor, activity modification, balance, behavior modification and body mechanics training  Frequency: 2x week  Duration in weeks: 10  Treatment plan discussed with: patient and family        Subjective Evaluation    History of Present Illness  Date of onset: 4/25/2023  Mechanism of injury: Pt is an 81 yomale who is RHD and presents today stating for several years he has had some symptoms wooziness for several years  He states 3 weeks ago he was getting up from a couch after sitting for a while, he pushed with his arms to stand up, he was looking down and reports that the floor began to spin, however he sat down to and it was successful to recovery of about 80%  Pt reports that he went to stand up again and was not 100% right and proceeded with his normal tasks  Pt reports that the next day he was still not feeling right his son in law who is primary care physician stopped by, looked at him, appeared to be able okay, but indicated he may have vertigo and should meet with PT  Pt reports that 1 week later he was in the bathroom, trying to pull his pants up, he again got dizzy fell forward onto his L leg  Pt reports that he had an ambulance come, came to [place] and there were no fractures, no head injury, pt denies hitting his head  Pt was admitted to the hospital x 3 days, had significant blood work and imagery which was (-) of abnormality    They also believed he "was slightly dehydrated and he received IV lines  He then was sent home, and has been trending better ever since  Pt reports that he still feels woozy in the AM, sits for 2-3 mins, and then slow progression of movement and his symptoms also improve  Pt indicates this has been his AM routine for several years  Pt reports that he also feels mildly unsteady and prefers \"3 points of contact  \"  Pt reports that he is able to go through out his day he has noted no issues, has been working with hobbies and reading, occasional naps, not experiencing significant if at all wooziness when arising post nap  Pt reports no dizziness with rolling in bed  Pt reports he feels as though he is almost back to base line with his dizziness and wooziness  He states that he is currently working with his family physician to balance medication  He states that his goals at this time are to be able to improve LE weakness, secure balance and endurance as he is limited to 10 mins or so before being fatigued  Desires to return back to his prior status of mobility and confidence he had prior to fall    Pt denies change in bowel or bladder, no neck pain, head aches or UE/LE numbness   Quality of life: good    Pain  Current pain ratin  At best pain ratin  At worst pain ratin (when thigh is touched from bruising  )  Quality: dull ache, sharp and tight  Progression: improved      Diagnostic Tests  X-ray: normal  MRI studies: normal  Patient Goals  Patient goals for therapy: increased strength, return to sport/leisure activities, increased motion and improved balance          Objective     Active Range of Motion   Cervical/Thoracic Spine       Cervical  Subcranial protraction:   Restriction level: moderate  Subcranial retraction:   Restriction level: moderate  Flexion:  Restriction level: moderate  Extension:  Restriction level: moderate  Left lateral flexion:  Restriction level: maximal  Right lateral flexion:  Restriction level " "moderate  Left rotation:  Restriction level: maximal  Right rotation:  Restriction level: moderate    Additional Active Range of Motion Details  Max forward head, rounded shoulders  Pt is near sighted, wears glasses  B/L Sensation intact to light touch C3,4,5,6,7,8,T1,T2  UE/LE screen WFL strong and painless B  CS Screen no production of symptoms  Palpation  Ocular ROM WFL more challenge looking L vs R  Convergence/Divergence WFL   VOR to L 2, R 1 5, Sup 1 5, Inf 1  STs Head Shake (-), Eye Cover (-) Head Thrust (-), DHP (-)    TU 22\", short quick steps  Increased trunk sway  BP with Sit<> Stand:  1st trial:  BP:  150/76 // 125/ 60  O2 Sat: 98% // 98%  HR: 67 // 80  2nd trial  BP:  150/76 // 135 / 64  O2 Sat: 98% // 98%  HR: 67 // 78     Asymptomatic during both trials  Precautions: Falls, HTN, DM2, Hx of Skin CA        Manuals                                                                 Neuro Re-Ed             Education and progression 15 min            NBOS EC             NBOS EO Foam             VOR x 1 head stable 2 targets             VOR x 1 head moving 1 target             AP/ML Walking             Cone [place]olum             Cone Step Over                                       Ther Ex             CS Range to L Rot/R Rot             CS Ret + Ext for Range             Scap Add                                       Ther Activity             Mini Squats             Sit<>Stand High Low             Step up + Down  4-6\"                                     Modalities                                          "

## 2023-05-01 ENCOUNTER — OFFICE VISIT (OUTPATIENT)
Dept: PHYSICAL THERAPY | Facility: CLINIC | Age: 88
End: 2023-05-01

## 2023-05-01 DIAGNOSIS — R26.9 GAIT ABNORMALITY: ICD-10-CM
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
## 2023-05-01 NOTE — PROGRESS NOTES
"Daily Note     Today's date: 2023  Patient name: Kaylynn Lujan  : 1935  MRN: [id number]  Referring provider: Nas Loera DO  Dx:   Encounter Diagnosis     ICD-10-CM    1  Fall, subsequent encounter  W19  XXXD       2  Gait abnormality  R26 9                      Subjective: Pt presents today stating mild wooziness with first stand after sitting or laying down for a while but otherwise doing well  Objective: See treatment diary below      Assessment: Proceeded with outlined activities without symptom exacerbation  Mild LOB Min A for recovery with EC on NBOS Foam   Continue to progress as able  Precautions: Falls, HTN, DM2, Hx of Skin CA     [facility]  Access ZWWX: [email or web address]        Manuals                                                                Neuro Re-Ed             Education and progression 15 min 5 min           NBOS EC  1 min           NBOS EC Foam  1 min           VOR x 1 head stable 2 targets  1 min           VOR x 1 head moving 1 target  1 min           AP/ML Walking  4 laps           Cone [place]olum             Cone Step Over                                       Ther Ex             CS Range to L Rot/R Rot  3\" 2 x 5           CS Ret + Ext for Range  3\" 2 x 5           Scap Add  3\" x 10           HR/TR  2 x 10                        Ther Activity             Mini Squats  2 x 10           Sit<>Stand High Low             Step up + Down  4-6\"                                     Modalities                                            "

## 2023-05-03 ENCOUNTER — HOSPITAL ENCOUNTER (OUTPATIENT)
Dept: RADIOLOGY | Facility: HOSPITAL | Age: 88
Discharge: HOME | End: 2023-05-03
Attending: FAMILY MEDICINE

## 2023-05-03 ENCOUNTER — TELEPHONE (OUTPATIENT)
Dept: FAMILY MEDICINE CLINIC | Facility: CLINIC | Age: 88
End: 2023-05-03
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
## 2023-05-03 DIAGNOSIS — R42 VERTIGO: ICD-10-CM

## 2023-05-03 DIAGNOSIS — R93.0 ABNORMAL CT OF THE HEAD: ICD-10-CM

## 2023-05-03 DIAGNOSIS — E11.42 DIABETIC POLYNEUROPATHY ASSOCIATED WITH TYPE 2 DIABETES MELLITUS (HCC): ICD-10-CM

## 2023-05-03 DIAGNOSIS — E78.2 MIXED HYPERLIPIDEMIA: ICD-10-CM

## 2023-05-03 RX ADMIN — GADOBUTROL 10 ML: 604.72 INJECTION INTRAVENOUS at 08:31

## 2023-05-04 ENCOUNTER — OFFICE VISIT (OUTPATIENT)
Dept: PHYSICAL THERAPY | Facility: CLINIC | Age: 88
End: 2023-05-04
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
## 2023-05-04 DIAGNOSIS — R26.9 GAIT ABNORMALITY: ICD-10-CM

## 2023-05-04 RX ORDER — SIMVASTATIN 10 MG
TABLET ORAL
Qty: 90 TABLET | Refills: 1 | Status: SHIPPED | OUTPATIENT
Start: 2023-05-04

## 2023-05-04 NOTE — PROGRESS NOTES
"Daily Note     Today's date: 2023  Patient name: Manpreet Duckworth  : 1935  MRN: [id number]  Referring provider: Chip Randolph DO  Dx:   Encounter Diagnosis     ICD-10-CM    1  Fall, subsequent encounter  W19  XXXD       2  Gait abnormality  R26 9           Start Time: 1430  Stop Time: 1505  Total time in clinic (min): 35 minutes    Subjective: Pt reports that he has felt unsteady on the stair today, unsure if its due to knee strength or his balance  Objective: See treatment diary below  Seated BP: 125/80 mmHg  Standin/75mmHg     Assessment: Tolerated treatment well  Fatigues with exercise but no LOB throughout  Patient demonstrated fatigue post treatment and would benefit from continued PT      Plan: Continue per plan of care  Precautions: Falls, HTN, DM2, Hx of Skin CA     [facility]  Access TQSI: [email or web address]        Manuals  05                                                              Neuro Re-Ed             Education and progression 15 min 5 min           NBOS EC  1 min 1 min           NBOS EC Foam  1 min  1 min           VOR x 1 head stable 2 targets  1 min 1 min           VOR x 1 head moving 1 target  1 min 1 min           AP/ML Walking  4 laps 5 laps           Cone [place]olum             Cone Step Over                                       Ther Ex             CS Range to L Rot/R Rot  3\" 2 x 5 3\" 2x5           CS Ret + Ext for Range  3\" 2 x 5 3\" 2x5           Scap Add  3\" x 10 3\"x10          HR/TR  2 x 10 2x10                        Ther Activity             Mini Squats  2 x 10 2x10          Sit<>Stand High Low   Chair   10x           Step up + Down  4-6\"                                     Modalities                                            "

## 2023-05-09 ENCOUNTER — OFFICE VISIT (OUTPATIENT)
Dept: PHYSICAL THERAPY | Facility: CLINIC | Age: 88
End: 2023-05-09
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
## 2023-05-09 DIAGNOSIS — R26.9 GAIT ABNORMALITY: ICD-10-CM

## 2023-05-09 NOTE — PROGRESS NOTES
"Daily Note     Today's date: 2023  Patient name: Richy Solis  : 1935  MRN: [id number]  Referring provider: Jenni Lambert DO  Dx:   Encounter Diagnosis     ICD-10-CM    1  Fall, subsequent encounter  W19  XXXD       2  Gait abnormality  R26 9           Start Time: 1400  Stop Time: 1430  Total time in clinic (min): 30 minutes    Subjective: Pt reports that his dizziness continues  But he has not had any new falls  Objective: See treatment diary below      Assessment: Tolerated treatment well  No LOB throughout  Patient would benefit from continued PT      Plan: Continue per plan of care  Precautions: Falls, HTN, DM2, Hx of Skin CA     [facility] [employer]  Access CSOB: [email or web address]        Manuals                                                              Neuro Re-Ed             Education and progression 15 min 5 min           NBOS EC  1 min 1 min  1 min         NBOS EC Foam  1 min  1 min  1 min          VOR x 1 head stable 2 targets  1 min 1 min  1 min          VOR x 1 head moving 1 target  1 min 1 min  1 min          AP/ML Walking  4 laps 5 laps  5 laps         Cone Slolum             Cone Step Over    High hurdles   3 laps                                    Ther Ex             CS Range to L Rot/R Rot  3\" 2 x 5 3\" 2x5  3\" 2x5         CS Ret + Ext for Range  3\" 2 x 5 3\" 2x5  3\" 2x5         Scap Add  3\" x 10 3\"x10 3\"x10          HR/TR  2 x 10 2x10  2x10                       Ther Activity             Mini Squats  2 x 10 2x10 2x10         Sit<>Stand High Low   Chair   10x  Chair 5v         Step up + Down  4-6\"                                     Modalities                                            "

## 2023-05-11 ENCOUNTER — OFFICE VISIT (OUTPATIENT)
Dept: PHYSICAL THERAPY | Facility: CLINIC | Age: 88
End: 2023-05-11

## 2023-05-11 ENCOUNTER — OFFICE VISIT (OUTPATIENT)
Dept: PODIATRY | Facility: CLINIC | Age: 88
End: 2023-05-11

## 2023-05-11 VITALS — WEIGHT: 223 LBS | BODY MASS INDEX: 31.22 KG/M2 | RESPIRATION RATE: 16 BRPM | HEIGHT: 71 IN

## 2023-05-11 DIAGNOSIS — I70.209 PERIPHERAL ARTERIOSCLEROSIS (HCC): ICD-10-CM
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
## 2023-05-11 DIAGNOSIS — M79.672 PAIN IN BOTH FEET: ICD-10-CM

## 2023-05-11 DIAGNOSIS — E11.42 DIABETIC POLYNEUROPATHY ASSOCIATED WITH TYPE 2 DIABETES MELLITUS (HCC): Primary | ICD-10-CM

## 2023-05-11 DIAGNOSIS — R26.9 GAIT ABNORMALITY: ICD-10-CM

## 2023-05-11 DIAGNOSIS — M79.671 PAIN IN BOTH FEET: ICD-10-CM

## 2023-05-11 DIAGNOSIS — L84 CORNS: ICD-10-CM

## 2023-05-11 NOTE — PROGRESS NOTES
"Daily Note     Today's date: 2023  Patient name: Jenaro Fairchild  : 1935  MRN: [id number]  Referring provider: Kadie Gates DO  Dx:   Encounter Diagnosis     ICD-10-CM    1  Fall, subsequent encounter  W19  XXXD       2  Gait abnormality  R26 9           Start Time: 1130  Stop Time: 1215  Total time in clinic (min): 45 minutes    Subjective: Pt reports that his unsteadiness is getting better, no dizziness with transition from sit to stand  Notices that his knee pain limits him  Objective: See treatment diary below      Assessment: Tolerated treatment well  Progressing well with balance Patient would benefit from continued PT      Plan: Continue per plan of care  Progress balance NV  Precautions: Falls, HTN, DM2, Hx of Skin CA     [facility] [employer] [facility] [facility]  Access VCAU: [email or web address]        Manuals                                                             Neuro Re-Ed             Education and progression 15 min 5 min           NBOS EC  1 min 1 min  1 min 1 min         NBOS EC Foam  1 min  1 min  1 min  1 min         VOR x 1 head stable 2 targets  1 min 1 min  1 min  1 min        VOR x 1 head moving 1 target  1 min 1 min  1 min  1 min        AP/ML Walking  4 laps 5 laps  5 laps 5 laps         Cone [place]olum             Cone Step Over    High hurdles   3 laps  5 laps ea                                  Ther Ex             CS Range to L Rot/R Rot  3\" 2 x 5 3\" 2x5  3\" 2x5 3\"  2x5         CS Ret + Ext for Range  3\" 2 x 5 3\" 2x5  3\" 2x5 3\" 2x5        Scap Add  3\" x 10 3\"x10 3\"x10  3\"x10         HR/TR  2 x 10 2x10  2x10  2x10                      Ther Activity             Mini Squats  2 x 10 2x10 2x10 2x10         Sit<>Stand High Low   Chair   10x  Chair 5x Chair 5x        Step up + Down  4-6\"                                     Modalities                                            "

## 2023-05-16 ENCOUNTER — OFFICE VISIT (OUTPATIENT)
Dept: PHYSICAL THERAPY | Facility: CLINIC | Age: 88
End: 2023-05-16
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
## 2023-05-16 DIAGNOSIS — R26.9 GAIT ABNORMALITY: ICD-10-CM

## 2023-05-16 NOTE — PROGRESS NOTES
"Daily Note     Today's date: 2023  Patient name: Manjinder Brown  : 1935  MRN: [id number]  Referring provider: Leonardo Tijerina DO  Dx:   Encounter Diagnosis     ICD-10-CM    1  Fall, subsequent encounter  W19  XXXD       2  Gait abnormality  R26 9                      Subjective: Pt presents today stating elevated knee pain today, mild stomach ache, but no dizziness  Feeling well in regards of this  Objective: See treatment diary below      Assessment: Withheld some of more involved CKC based intervention secondary to knee pain  Follow up in regards of this if possible  Precautions: Falls, HTN, DM2, Hx of Skin CA     [facility]  Access TWTS: [email or web address]        Manuals                                                            Neuro Re-Ed             Education and progression 15 min 5 min           NBOS EC  1 min 1 min  1 min 1 min   1 min        NBOS EC Foam  1 min  1 min  1 min  1 min  1 min       VOR x 1 head stable 2 targets  1 min 1 min  1 min  1 min 1 min       VOR x 1 head moving 1 target  1 min 1 min  1 min  1 min 1 min       AP/ML Walking  4 laps 5 laps  5 laps 5 laps  5 laps       Cone Slolum             Cone Step Over    High hurdles   3 laps  5 laps ea 5 laps                                  Ther Ex             CS Range to L Rot/R Rot  3\" 2 x 5 3\" 2x5  3\" 2x5 3\"  2x5  3\" x 2 x 5       CS Ret + Ext for Range  3\" 2 x 5 3\" 2x5  3\" 2x5 3\" 2x5 3\" x 2 x 5       Scap Add  3\" x 10 3\"x10 3\"x10  3\"x10  3\" x 10       HR/TR  2 x 10 2x10  2x10  2x10  2 x 10                    Ther Activity             Mini Squats  2 x 10 2x10 2x10 2x10  held       Sit<>Stand High Low   Chair   10x  Chair 5x Chair 5x held       Step up + Down  4-6\"                                     Modalities                                            "

## 2023-05-18 ENCOUNTER — OFFICE VISIT (OUTPATIENT)
Dept: PHYSICAL THERAPY | Facility: CLINIC | Age: 88
End: 2023-05-18
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
## 2023-05-18 DIAGNOSIS — R26.9 GAIT ABNORMALITY: ICD-10-CM

## 2023-05-18 NOTE — PROGRESS NOTES
"Daily Note     Today's date: 2023  Patient name: Evans Sorenson  : 1935  MRN: [id number]  Referring provider: Eugene Ortiz DO  Dx:   Encounter Diagnosis     ICD-10-CM    1  Fall, subsequent encounter  W19  XXXD       2  Gait abnormality  R26 9           Start Time: 1430  Stop Time: 1506  Total time in clinic (min): 36 minutes    Subjective: Pt reports that he is feeling pretty good today, no dizziness to start  Objective: See treatment diary below      Assessment: Tolerated treatment well  One occurrence of LOB during hurdles that patient was able to use stepping strategy to avoid fall  Patient demonstrated fatigue post treatment      Plan: Continue per plan of care  Precautions: Falls, HTN, DM2, Hx of Skin CA     [employer]  Access YBLQ: [email or web address]        Manuals                                                           Neuro Re-Ed             Education and progression 15 min 5 min           NBOS EC  1 min 1 min  1 min 1 min   1 min  1 min      NBOS EC Foam  1 min  1 min  1 min  1 min  1 min 1 min      VOR x 1 head stable 2 targets  1 min 1 min  1 min  1 min 1 min 1 min      VOR x 1 head moving 1 target  1 min 1 min  1 min  1 min 1 min 1 min      AP/ML Walking  4 laps 5 laps  5 laps 5 laps  5 laps 5 laps      [facility]             Cone Step Over    High hurdles   3 laps  5 laps ea 5 laps  5 laps                                 Ther Ex             CS Range to L Rot/R Rot  3\" 2 x 5 3\" 2x5  3\" 2x5 3\"  2x5  3\" x 2 x 5 3\"x2x5       CS Ret + Ext for Range  3\" 2 x 5 3\" 2x5  3\" 2x5 3\" 2x5 3\" x 2 x 5 3\" 2x5      Scap Add  3\" x 10 3\"x10 3\"x10  3\"x10  3\" x 10 3\"x10       HR/TR  2 x 10 2x10  2x10  2x10  2 x 10 2x10                   Ther Activity             Mini Squats  2 x 10 2x10 2x10 2x10  held       Sit<>Stand High Low   Chair   10x  Chair 5x Chair 5x held       Step up + Down  4-6\"                                     Modalities                   "

## 2023-05-23 ENCOUNTER — EVALUATION (OUTPATIENT)
Dept: PHYSICAL THERAPY | Facility: CLINIC | Age: 88
End: 2023-05-23

## 2023-05-23 DIAGNOSIS — R26.9 GAIT ABNORMALITY: ICD-10-CM
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
## 2023-05-23 NOTE — PROGRESS NOTES
PT Evaluation     Today's date: 2023  Patient name: Dariel Agudelo  : 1935  MRN: [id number]  Referring provider: Black Reaves DO  Dx:   Encounter Diagnosis     ICD-10-CM    1  Fall, subsequent encounter  W19  XXXD       2  Gait abnormality  R26 9                      Assessment  Assessment details: Pt a this time demonstrates abolishment of dizziness, improved ambulation quality, endurance, mobility, VOR speed and has essentially achieved all goals sought out for him as well as by him  If he is to have a decline in any form he is welcome to return as needed or if he is to choose to seek care for his knees he is welcome to scheduled formal evaluation at this time  Thank you very much for this kind and motivated referral       Impairments: abnormal gait, abnormal or restricted ROM, activity intolerance, impaired balance, impaired physical strength, lacks appropriate home exercise program, pain with function, poor posture  and poor body mechanics  Understanding of Dx/Px/POC: good   Prognosis: good    Goals  STG 4 Weeks:  Decrease pain at worst to 4 -met  Improve range to CS Range to Mod -met  Improve VOR speed to 1 5 -met  Improve walking endurance to 20 mins -met  Independent with HEP -met  LTG 8 Weeks:  Decrease pain at worst to 2 -met  Improve range to CS range to min -met  Improve VOR Speed 2 -met  Improve walking endurance to 35 -met  Able to perform all desired activities with minimal to nil symptom exacerbation -met      Plan  Plan details:  Cordell Nelson is Wife  Planned modality interventions: cryotherapy and thermotherapy: hydrocollator packs  Planned therapy interventions: abdominal trunk stabilization, joint mobilization, manual therapy, neuromuscular re-education, patient education, postural training, strengthening, stretching, therapeutic activities, therapeutic exercise, therapeutic training, transfer training, home exercise program, graded motor, activity modification, balance, behavior modification and body mechanics training  Duration in weeks: 7  Treatment plan discussed with: patient and family        Subjective Evaluation    History of Present Illness  Date of onset: 2023  Mechanism of injury: Pt presents today stating he is feeling well  His thigh is also feeling really well from where he fell  States that overall he is not having the same condition as to why he initially came in for, no dizzy, woozy, uneasiness  He reports that he is having some knee pain, that he is still contemplating whether or not he is intending to see care of it  Pt reports that otherwise he is feeling well, more confident with his mobility, his balance, and very content with his progression  Pt reports that otherwise he feels as though he is ready for DC  Quality of life: good    Pain  Current pain ratin  At best pain ratin  At worst pain ratin (when thigh is touched from bruising  )  Quality: dull ache, sharp and tight  Progression: improved      Diagnostic Tests  X-ray: normal  MRI studies: normal  Patient Goals  Patient goals for therapy: increased strength, return to sport/leisure activities, increased motion and improved balance          Objective     Active Range of Motion   Cervical/Thoracic Spine       Cervical  Subcranial protraction:   Restriction level: minimal  Subcranial retraction:   Restriction level: minimal  Flexion:  Restriction level: minimal  Extension:  Restriction level: minimal  Left lateral flexion:  Restriction level: minimal  Right lateral flexion:  Restriction level minimal  Left rotation:  Restriction level: minimal  Right rotation:  Restriction level: minimal    Additional Active Range of Motion Details  Max forward head, rounded shoulders  Pt is near sighted, wears glasses  B/L Sensation intact to light touch C3,4,5,6,7,8,T1,T2  UE/LE screen WFL strong and painless B  CS Screen no production of symptoms     Palpation  Ocular ROM WFL more challenge looking L vs R "(improved)  Convergence/Divergence WFL   VOR to L 2, R 2, Sup 2, Inf 2  STs Head Shake (-), Eye Cover (-) Head Thrust (-), DHP (-)    TU 22\", short quick steps  Increased trunk sway  //  10 2\"    BP with Sit<> Stand:  1st trial:  BP:  150/76 // 125/ 60  O2 Sat: 98% // 98%  HR: 67 // 80  2nd trial  BP:  150/76 // 135 / 64  O2 Sat: 98% // 98%  HR: 67 // 78     Asymptomatic during both trials  (NP Today)  Precautions: Falls, HTN, DM2, Hx of Skin CA     [employer]  Access DONV: [email or web address]        Manuals                                                          Neuro Re-Ed             Education and progression 15 min 5 min      15 min assessment        NBOS EC  1 min 1 min  1 min 1 min   1 min  1 min      NBOS EC Foam  1 min  1 min  1 min  1 min  1 min 1 min      VOR x 1 head stable 2 targets  1 min 1 min  1 min  1 min 1 min 1 min      VOR x 1 head moving 1 target  1 min 1 min  1 min  1 min 1 min 1 min      AP/ML Walking  4 laps 5 laps  5 laps 5 laps  5 laps 5 laps      [facility]             Cone Step Over    High hurdles   3 laps  5 laps ea 5 laps  5 laps                                 Ther Ex             CS Range to L Rot/R Rot  3\" 2 x 5 3\" 2x5  3\" 2x5 3\"  2x5  3\" x 2 x 5 3\"x2x5  review     CS Ret + Ext for Range  3\" 2 x 5 3\" 2x5  3\" 2x5 3\" 2x5 3\" x 2 x 5 3\" 2x5 review     Scap Add  3\" x 10 3\"x10 3\"x10  3\"x10  3\" x 10 3\"x10  review     HR/TR  2 x 10 2x10  2x10  2x10  2 x 10 2x10 review                  Ther Activity             Mini Squats  2 x 10 2x10 2x10 2x10  held       Sit<>Stand High Low   Chair   10x  Chair 5x Chair 5x held       Step up + Down  4-6\"                                     Modalities                                            "

## 2023-05-25 ENCOUNTER — APPOINTMENT (OUTPATIENT)
Dept: PHYSICAL THERAPY | Facility: CLINIC | Age: 88
End: 2023-05-25
Payer: COMMERCIAL

## 2023-05-25 DIAGNOSIS — F32.A MINOR DEPRESSION: ICD-10-CM

## 2023-05-26 RX ORDER — VILAZODONE HYDROCHLORIDE 20 MG/1
TABLET ORAL
Qty: 90 TABLET | Refills: 3 | Status: SHIPPED | OUTPATIENT
Start: 2023-05-26

## 2023-06-18 DIAGNOSIS — E11.42 DIABETIC POLYNEUROPATHY ASSOCIATED WITH TYPE 2 DIABETES MELLITUS (HCC): ICD-10-CM

## 2023-06-19 RX ORDER — SITAGLIPTIN 50 MG/1
TABLET, FILM COATED ORAL
Qty: 90 TABLET | Refills: 3 | Status: SHIPPED | OUTPATIENT
Start: 2023-06-19

## 2023-06-19 RX ORDER — METFORMIN HYDROCHLORIDE 500 MG/1
TABLET, EXTENDED RELEASE ORAL
Qty: 90 TABLET | Refills: 3 | Status: SHIPPED | OUTPATIENT
Start: 2023-06-19

## 2023-06-21 ENCOUNTER — TELEPHONE (OUTPATIENT)
Dept: FAMILY MEDICINE CLINIC | Facility: CLINIC | Age: 88
End: 2023-06-21

## 2023-06-21 LAB
ALBUMIN SERPL-MCNC: 4.3 G/DL (ref 3.6–4.6)
ALBUMIN/GLOB SERPL: 1.8 {RATIO} (ref 1.2–2.2)
ALP SERPL-CCNC: 61 IU/L (ref 44–121)
ALT SERPL-CCNC: 14 IU/L (ref 0–44)
AST SERPL-CCNC: 17 IU/L (ref 0–40)
BASOPHILS # BLD AUTO: 0.1 X10E3/UL (ref 0–0.2)
BASOPHILS NFR BLD AUTO: 1 %
BILIRUB SERPL-MCNC: 0.5 MG/DL (ref 0–1.2)
BUN SERPL-MCNC: 14 MG/DL (ref 8–27)
BUN/CREAT SERPL: 10 (ref 10–24)
CALCIUM SERPL-MCNC: 9.1 MG/DL (ref 8.6–10.2)
CHLORIDE SERPL-SCNC: 104 MMOL/L (ref 96–106)
CO2 SERPL-SCNC: 23 MMOL/L (ref 20–29)
CREAT SERPL-MCNC: 1.34 MG/DL (ref 0.76–1.27)
EGFR: 51 ML/MIN/1.73
EOSINOPHIL # BLD AUTO: 0.2 X10E3/UL (ref 0–0.4)
EOSINOPHIL NFR BLD AUTO: 4 %
ERYTHROCYTE [DISTWIDTH] IN BLOOD BY AUTOMATED COUNT: 12.6 % (ref 11.6–15.4)
GLOBULIN SER-MCNC: 2.4 G/DL (ref 1.5–4.5)
GLUCOSE SERPL-MCNC: 205 MG/DL (ref 70–99)
HBA1C MFR BLD: 8.1 % (ref 4.8–5.6)
HCT VFR BLD AUTO: 42.2 % (ref 37.5–51)
HGB BLD-MCNC: 14.7 G/DL (ref 13–17.7)
IMM GRANULOCYTES # BLD: 0 X10E3/UL (ref 0–0.1)
IMM GRANULOCYTES NFR BLD: 0 %
LYMPHOCYTES # BLD AUTO: 1.7 X10E3/UL (ref 0.7–3.1)
LYMPHOCYTES NFR BLD AUTO: 29 %
MCH RBC QN AUTO: 30.8 PG (ref 26.6–33)
MCHC RBC AUTO-ENTMCNC: 34.8 G/DL (ref 31.5–35.7)
MCV RBC AUTO: 89 FL (ref 79–97)
MICRODELETION SYND BLD/T FISH: NORMAL
MONOCYTES # BLD AUTO: 0.6 X10E3/UL (ref 0.1–0.9)
MONOCYTES NFR BLD AUTO: 11 %
NEUTROPHILS # BLD AUTO: 3.4 X10E3/UL (ref 1.4–7)
NEUTROPHILS NFR BLD AUTO: 55 %
PLATELET # BLD AUTO: 161 X10E3/UL (ref 150–450)
POTASSIUM SERPL-SCNC: 4.1 MMOL/L (ref 3.5–5.2)
PROT SERPL-MCNC: 6.7 G/DL (ref 6–8.5)
RBC # BLD AUTO: 4.77 X10E6/UL (ref 4.14–5.8)
SODIUM SERPL-SCNC: 143 MMOL/L (ref 134–144)
WBC # BLD AUTO: 6 X10E3/UL (ref 3.4–10.8)

## 2023-06-22 ENCOUNTER — RA CDI HCC (OUTPATIENT)
Dept: OTHER | Facility: HOSPITAL | Age: 88
End: 2023-06-22

## 2023-06-22 NOTE — PROGRESS NOTES
[facility]  coding opportunities     E11 65 and E11 22     Chart Reviewed number of suggestions sent to Provider: 2     Patients Insurance     Medicare Insurance: [address] [place] [address]

## 2023-06-27 ENCOUNTER — OFFICE VISIT (OUTPATIENT)
Dept: FAMILY MEDICINE CLINIC | Facility: CLINIC | Age: 88
End: 2023-06-27
Payer: COMMERCIAL

## 2023-06-27 VITALS
SYSTOLIC BLOOD PRESSURE: 138 MMHG | BODY MASS INDEX: 31.14 KG/M2 | TEMPERATURE: 98.9 F | DIASTOLIC BLOOD PRESSURE: 76 MMHG | WEIGHT: 222.4 LBS | HEART RATE: 63 BPM | RESPIRATION RATE: 17 BRPM | HEIGHT: 71 IN

## 2023-06-27 DIAGNOSIS — L98.9 SKIN LESION: ICD-10-CM

## 2023-06-27 DIAGNOSIS — I12.9 BENIGN HYPERTENSION WITH CKD (CHRONIC KIDNEY DISEASE) STAGE III (HCC): ICD-10-CM

## 2023-06-27 DIAGNOSIS — N18.31 STAGE 3A CHRONIC KIDNEY DISEASE (HCC): ICD-10-CM

## 2023-06-27 DIAGNOSIS — Z00.00 MEDICARE ANNUAL WELLNESS VISIT, SUBSEQUENT: ICD-10-CM

## 2023-06-27 DIAGNOSIS — E11.43 TYPE 2 DIABETES MELLITUS WITH DIABETIC AUTONOMIC NEUROPATHY, WITHOUT LONG-TERM CURRENT USE OF INSULIN (HCC): Primary | ICD-10-CM

## 2023-06-27 DIAGNOSIS — E11.42 DIABETIC POLYNEUROPATHY ASSOCIATED WITH TYPE 2 DIABETES MELLITUS (HCC): ICD-10-CM

## 2023-06-27 DIAGNOSIS — E78.2 MIXED HYPERLIPIDEMIA: ICD-10-CM

## 2023-06-27 DIAGNOSIS — N18.30 BENIGN HYPERTENSION WITH CKD (CHRONIC KIDNEY DISEASE) STAGE III (HCC): ICD-10-CM

## 2023-06-27 DIAGNOSIS — Z13.820 SCREENING FOR OSTEOPOROSIS: ICD-10-CM

## 2023-06-27 DIAGNOSIS — I10 HYPERTENSION, UNSPECIFIED TYPE: ICD-10-CM

## 2023-06-27 PROCEDURE — G0439 PPPS, SUBSEQ VISIT: HCPCS | Performed by: FAMILY MEDICINE

## 2023-06-27 PROCEDURE — 99214 OFFICE O/P EST MOD 30 MIN: CPT | Performed by: FAMILY MEDICINE

## 2023-06-27 RX ORDER — REPAGLINIDE 1 MG/1
1 TABLET ORAL
Qty: 180 TABLET | Refills: 1 | Status: SHIPPED | OUTPATIENT
Start: 2023-06-27 | End: 2023-12-24

## 2023-06-27 NOTE — PATIENT INSTRUCTIONS
Medicare Preventive Visit Patient Instructions  Thank you for completing your Welcome to Medicare Visit or Medicare Annual Wellness Visit today  Your next wellness visit will be due in one year (6/27/2024)  The screening/preventive services that you may require over the next 5-10 years are detailed below  Some tests may not apply to you based off risk factors and/or age  Screening tests ordered at today's visit but not completed yet may show as past due  Also, please note that scanned in results may not display below  Preventive Screenings:  Service Recommendations Previous Testing/Comments   Colorectal Cancer Screening  · Colonoscopy    · Fecal Occult Blood Test (FOBT)/Fecal Immunochemical Test (FIT)  · Fecal DNA/Cologuard Test  · Flexible Sigmoidoscopy Age: 39-70 years old   Colonoscopy: every 10 years (May be performed more frequently if at higher risk)  OR  FOBT/FIT: every 1 year  OR  Cologuard: every 3 years  OR  Sigmoidoscopy: every 5 years  Screening may be recommended earlier than age 39 if at higher risk for colorectal cancer  Also, an individualized decision between you and your healthcare provider will decide whether screening between the ages of 74-80 would be appropriate   Colonoscopy: Not on file  FOBT/FIT: Not on file  Cologuard: Not on file  Sigmoidoscopy: Not on file    Screening Not Indicated     Prostate Cancer Screening Individualized decision between patient and health care provider in men between ages of 53-78   Medicare will cover every 12 months beginning on the day after your 50th birthday PSA: 1 7 ng/mL     Screening Not Indicated     Hepatitis C Screening Once for adults born between 1945 and 1965  More frequently in patients at high risk for Hepatitis C Hep C Antibody: Not on file        Diabetes Screening 1-2 times per year if you're at risk for diabetes or have pre-diabetes Fasting glucose: No results in last 5 years (No results in last 5 years)  A1C: 8 1 % (6/20/2023)  Screening Not Indicated  History Diabetes   Cholesterol Screening Once every 5 years if you don't have a lipid disorder  May order more often based on risk factors  Lipid panel: 02/08/2023  Screening Not Indicated  History Lipid Disorder      Other Preventive Screenings Covered by Medicare:  1  Abdominal Aortic Aneurysm (AAA) Screening: covered once if your at risk  You're considered to be at risk if you have a family history of AAA or a male between the age of 73-68 who smoking at least 100 cigarettes in your lifetime  2  Lung Cancer Screening: covers low dose CT scan once per year if you meet all of the following conditions: (1) Age 50-69; (2) No signs or symptoms of lung cancer; (3) Current smoker or have quit smoking within the last 15 years; (4) You have a tobacco smoking history of at least 20 pack years (packs per day x number of years you smoked); (5) You get a written order from a healthcare provider  3  Glaucoma Screening: covered annually if you're considered high risk: (1) You have diabetes OR (2) Family history of glaucoma OR (3)  aged 48 and older OR (3)  American aged 72 and older  3  Osteoporosis Screening: covered every 2 years if you meet one of the following conditions: (1) Have a vertebral abnormality; (2) On glucocorticoid therapy for more than 3 months; (3) Have primary hyperparathyroidism; (4) On osteoporosis medications and need to assess response to drug therapy  5  HIV Screening: covered annually if you're between the age of 12-76  Also covered annually if you are younger than 13 and older than 72 with risk factors for HIV infection  For pregnant patients, it is covered up to 3 times per pregnancy      Immunizations:  Immunization Recommendations   Influenza Vaccine Annual influenza vaccination during flu season is recommended for all persons aged >= 6 months who do not have contraindications   Pneumococcal Vaccine   * Pneumococcal conjugate vaccine = PCV13 (Prevnar 13), PCV15 (Vaxneuvance), PCV20 (Prevnar 20)  * Pneumococcal polysaccharide vaccine = PPSV23 (Pneumovax) Adults 2364 years old: 1-3 doses may be recommended based on certain risk factors  Adults 72 years old: 1-2 doses may be recommended based off what pneumonia vaccine you previously received   Hepatitis B Vaccine 3 dose series if at intermediate or high risk (ex: diabetes, end stage renal disease, liver disease)   Tetanus (Td) Vaccine - COST NOT COVERED BY MEDICARE PART B Following completion of primary series, a booster dose should be given every 10 years to maintain immunity against tetanus  Td may also be given as tetanus wound prophylaxis  Tdap Vaccine - COST NOT COVERED BY MEDICARE PART B Recommended at least once for all adults  For pregnant patients, recommended with each pregnancy  Shingles Vaccine (Shingrix) - COST NOT COVERED BY MEDICARE PART B  2 shot series recommended in those aged 48 and above     Health Maintenance Due:  There are no preventive care reminders to display for this patient  Immunizations Due:  There are no preventive care reminders to display for this patient  Advance Directives   What are advance directives? Advance directives are legal documents that state your wishes and plans for medical care  These plans are made ahead of time in case you lose your ability to make decisions for yourself  Advance directives can apply to any medical decision, such as the treatments you want, and if you want to donate organs  What are the types of advance directives? There are many types of advance directives, and each state has rules about how to use them  You may choose a combination of any of the following:  · Living will: This is a written record of the treatment you want  You can also choose which treatments you do not want, which to limit, and which to stop at a certain time  This includes surgery, medicine, IV fluid, and tube feedings     · Durable power of  for healthcare [facility]): This is a written record that states who you want to make healthcare choices for you when you are unable to make them for yourself  This person, called a proxy, is usually a family member or a friend  You may choose more than 1 proxy  · Do not resuscitate (DNR) order:  A DNR order is used in case your heart stops beating or you stop breathing  It is a request not to have certain forms of treatment, such as CPR  A DNR order may be included in other types of advance directives  · Medical directive: This covers the care that you want if you are in a coma, near death, or unable to make decisions for yourself  You can list the treatments you want for each condition  Treatment may include pain medicine, surgery, blood transfusions, dialysis, IV or tube feedings, and a ventilator (breathing machine)  · Values history: This document has questions about your views, beliefs, and how you feel and think about life  This information can help others choose the care that you would choose  Why are advance directives important? An advance directive helps you control your care  Although spoken wishes may be used, it is better to have your wishes written down  Spoken wishes can be misunderstood, or not followed  Treatments may be given even if you do not want them  An advance directive may make it easier for your family to make difficult choices about your care  Fall Prevention    Fall prevention  includes ways to make your home and other areas safer  It also includes ways you can move more carefully to prevent a fall  Health conditions that cause changes in your blood pressure, vision, or muscle strength and coordination may increase your risk for falls  Medicines may also increase your risk for falls if they make you dizzy, weak, or sleepy  Fall prevention tips:   · Stand or sit up slowly  · Use assistive devices as directed  · Wear shoes that fit well and have soles that   · Wear a personal alarm      · Stay active  · Manage your medical conditions  Home Safety Tips:  · Add items to prevent falls in the bathroom  · Keep paths clear  · Install bright lights in your home  · Keep items you use often on shelves within reach  · Paint or place reflective tape on the edges of your stairs  Weight Management   Why it is important to manage your weight:  Being overweight increases your risk of health conditions such as heart disease, high blood pressure, type 2 diabetes, and certain types of cancer  It can also increase your risk for osteoarthritis, sleep apnea, and other respiratory problems  Aim for a slow, steady weight loss  Even a small amount of weight loss can lower your risk of health problems  How to lose weight safely:  A safe and healthy way to lose weight is to eat fewer calories and get regular exercise  You can lose up about 1 pound a week by decreasing the number of calories you eat by 500 calories each day  Healthy meal plan for weight management:  A healthy meal plan includes a variety of foods, contains fewer calories, and helps you stay healthy  A healthy meal plan includes the following:  · Eat whole-grain foods more often  A healthy meal plan should contain fiber  Fiber is the part of grains, fruits, and vegetables that is not broken down by your body  Whole-grain foods are healthy and provide extra fiber in your diet  Some examples of whole-grain foods are whole-wheat breads and pastas, oatmeal, brown rice, and bulgur  · Eat a variety of vegetables every day  Include dark, leafy greens such as spinach, kale, regan greens, and mustard greens  Eat yellow and orange vegetables such as carrots, sweet potatoes, and winter squash  · Eat a variety of fruits every day  Choose fresh or canned fruit (canned in its own juice or light syrup) instead of juice  Fruit juice has very little or no fiber  · Eat low-fat dairy foods  Drink fat-free (skim) milk or 1% milk   Eat fat-free yogurt and low-fat cottage cheese  Try low-fat cheeses such as mozzarella and other reduced-fat cheeses  · Choose meat and other protein foods that are low in fat  Choose beans or other legumes such as split peas or lentils  Choose fish, skinless poultry (chicken or turkey), or lean cuts of red meat (beef or pork)  Before you cook meat or poultry, cut off any visible fat  · Use less fat and oil  Try baking foods instead of frying them  Add less fat, such as margarine, sour cream, regular salad dressing and mayonnaise to foods  Eat fewer high-fat foods  Some examples of high-fat foods include french fries, doughnuts, ice cream, and cakes  · Eat fewer sweets  Limit foods and drinks that are high in sugar  This includes candy, cookies, regular soda, and sweetened drinks  Exercise:  Exercise at least 30 minutes per day on most days of the week  Some examples of exercise include walking, biking, dancing, and swimming  You can also fit in more physical activity by taking the stairs instead of the elevator or parking farther away from stores  Ask your healthcare provider about the best exercise plan for you  © Copyright [employer] 2018 Information is for End User's use only and may not be sold, redistributed or otherwise used for commercial purposes   All illustrations and images included in CareNotes® are the copyrighted property of A [employer] A M , [employer]  or [address] [place] [address] [employer]

## 2023-06-27 NOTE — PROGRESS NOTES
Assessment and Plan:     Problem List Items Addressed This Visit        Endocrine    Diabetic polyneuropathy associated with type 2 diabetes mellitus ([facility] [address] )    Relevant Medications    repaglinide (PRANDIN) 1 mg tablet    Other Relevant Orders    DXA bone density spine hip and pelvis    Type 2 diabetes mellitus with diabetic autonomic neuropathy, without long-term current use of insulin ([facility]) - Primary     Will increase the prandin to 1 mg with the two biggest meals of the day - postrprandial sugar w the 0 5 is in the 160's  Lab Results   Component Value Date    HGBA1C 8 1 (H) 06/20/2023            Relevant Medications    repaglinide (PRANDIN) 1 mg tablet    Other Relevant Orders    Albumin / creatinine urine ratio    Comprehensive metabolic panel    Hemoglobin A1C    CBC and differential    Lipid Panel with Direct LDL reflex    DXA bone density spine hip and pelvis       Cardiovascular and Mediastinum    Benign hypertension with CKD (chronic kidney disease) stage III ([facility])    Relevant Orders    Albumin / creatinine urine ratio    Comprehensive metabolic panel    Hemoglobin A1C    CBC and differential    Lipid Panel with Direct LDL reflex    DXA bone density spine hip and pelvis    Hypertension    Relevant Orders    Albumin / creatinine urine ratio    Comprehensive metabolic panel    Hemoglobin A1C    CBC and differential    Lipid Panel with Direct LDL reflex    DXA bone density spine hip and pelvis       Genitourinary    Stage 3a chronic kidney disease (HCC)    Relevant Orders    Albumin / creatinine urine ratio    Comprehensive metabolic panel    Hemoglobin A1C    CBC and differential    Lipid Panel with Direct LDL reflex    DXA bone density spine hip and pelvis       Other    Mixed hyperlipidemia    Relevant Orders    Albumin / creatinine urine ratio    Comprehensive metabolic panel    Hemoglobin A1C    CBC and differential    Lipid Panel with Direct LDL reflex    DXA bone density spine hip and pelvis   Other Visit Diagnoses     Medicare annual wellness visit, subsequent        Relevant Orders    DXA bone density spine hip and pelvis    Screening for osteoporosis        Relevant Orders    DXA bone density spine hip and pelvis    Skin lesion        Relevant Orders    Ambulatory Referral to Dermatology           Preventive health issues were discussed with patient, and age appropriate screening tests were ordered as noted in patient's After Visit Summary  Personalized health advice and appropriate referrals for health education or preventive services given if needed, as noted in patient's After Visit Summary  History of Present Illness:     Patient presents for a Medicare Wellness Visit    Pt is here for a follow up is also due for an AWV  Pt had labs  Pt states he has been feeling well  lightheadness is much better - maybe slight bit in the AM     Patient Care Team:  Judy Oliveira DO as PCP - JULEE Marroquin MD (Nephrology)     Review of Systems:     Review of Systems   Constitutional: Negative for activity change, appetite change, chills, diaphoresis, fatigue, fever and unexpected weight change  HENT: Negative for congestion, dental problem, ear pain, mouth sores, sinus pressure, sinus pain, sore throat and trouble swallowing  Eyes: Negative for photophobia, discharge and itching  Respiratory: Negative for apnea, chest tightness and shortness of breath  Cardiovascular: Negative for chest pain, palpitations and leg swelling  Gastrointestinal: Negative for abdominal distention, abdominal pain, blood in stool, nausea and vomiting  Endocrine: Negative for cold intolerance, heat intolerance, polydipsia, polyphagia and polyuria  Genitourinary: Negative for difficulty urinating  Musculoskeletal: Negative for arthralgias  Skin: Negative for color change and wound  Neurological: Negative for dizziness, syncope, speech difficulty and headaches     Hematological: Negative for adenopathy  Psychiatric/Behavioral: Negative for agitation and behavioral problems  Problem List:     Patient Active Problem List   Diagnosis   • Corns   • Pain in both feet   • Diabetic polyneuropathy associated with type 2 diabetes mellitus (HCC)   • Mixed hyperlipidemia   • Benign hypertension with CKD (chronic kidney disease) stage III (HCC)   • Minor depression   • Acquired deformity of foot   • Metatarsalgia of both feet   • Basal cell carcinoma of skin   • Stage 3a chronic kidney disease (HCC)   • Seasonal allergies   • Hypokalemia   • Microalbuminuria   • Hypertension   • Type 2 diabetes mellitus with diabetic autonomic neuropathy, without long-term current use of insulin (HCC)   • Fall   • Vertigo   • Abnormal CT of the head      Past Medical and Surgical History:     Past Medical History:   Diagnosis Date   • Ascending cholangitis     Last assessed 1/22/2015    • Atherosclerosis of arteries of extremities ([address] )     Resolved 12/19/2016    • Cancer ([address] )     basal cell on  left shoulder and left wrist   • Chronic kidney disease     stage 3   • Diabetes mellitus ([address] )    • [place] (hard of hearing)    • Hyperlipidemia    • Hypertension    • Seasonal allergies    • Wears hearing aid in both ears      Past Surgical History:   Procedure Laterality Date   • APPENDECTOMY      Last assessed 1/22/2015    • CHOLECYSTECTOMY      Last assessed 1/22/2015    • FRACTURE SURGERY      Open treatment of fracture of the radial shaft  Last assessed 1/22/2015    • ND XCAPSL CTRC RMVL INSJ IO LENS PROSTH W/O ECP Right 2/7/2022    Procedure: EXTRACTION EXTRACAPSULAR CATARACT PHACO INTRAOCULAR LENS (IOL);   Surgeon: Harmeet Howard MD;  Location: [facility] MAIN OR;  Service: Ophthalmology   • TONSILLECTOMY      Last assessed 1/22/2015       Family History:     Family History   Problem Relation Age of Onset   • Hypertension Father    • Other Father         Gastric ulcer    • Heart disease Mother    • Hypertension Mother    • Liver cancer Mother    • Melanoma Mother    • Diabetes Brother    • Diabetes Family    • Diabetes Maternal Grandfather    • Mental illness Neg Hx       Social History:     Social History     Socioeconomic History   • Marital status: /Civil Union     Spouse name: None   • Number of children: None   • Years of education: None   • Highest education level: None   Occupational History   • None   Tobacco Use   • Smoking status: Never   • Smokeless tobacco: Never   Vaping Use   • Vaping Use: Never used   Substance and Sexual Activity   • Alcohol use: Yes     Comment: rare   • Drug use: Never   • Sexual activity: None   Other Topics Concern   • None   Social History Narrative   • None     Social Determinants of Health     Financial Resource Strain: Low Risk  (6/27/2023)    Overall Financial Resource Strain (CARDIA)    • Difficulty of Paying Living Expenses: Not hard at all   Food Insecurity: Not on file   Transportation Needs: No Transportation Needs (6/27/2023)    PRAPARE - Transportation    • Lack of Transportation (Medical): No    • Lack of Transportation (Non-Medical):  No   Physical Activity: Not on file   Stress: Not on file   Social Connections: Not on file   Intimate Partner Violence: Not on file   Housing Stability: Not on file      Medications and Allergies:     Current Outpatient Medications   Medication Sig Dispense Refill   • amLODIPine-benazepril (LOTREL 5-10) 5-10 MG per capsule Take 1 capsule by mouth daily 90 capsule 1   • Bioflavonoid Products (ZULEYKA-C) 500-200-60 MG TABS Take by mouth     • carvedilol (COREG CR) 20 MG 24 hr capsule TAKE 1 CAPSULE BY MOUTH  DAILY 90 capsule 3   • Cinnamon 500 MG TABS Take by mouth     • clotrimazole-betamethasone (LOTRISONE) 1-0 05 % cream Apply 1 application topically 2 (two) times a day To affected area 45 g 5   • fluticasone (FLONASE) 50 mcg/act nasal spray into each nostril     • glipiZIDE (GLUCOTROL XL) 10 mg 24 hr tablet TAKE 1 TABLET BY MOUTH DAILY 90 tablet 3   • glucose blood test strip by In Vitro route     • hydrocortisone 1 % cream Apply topically 4 (four) times a day as needed for irritation 120 g 0   • Januvia 50 MG tablet TAKE 1 TABLET BY MOUTH DAILY 90 tablet 3   • ketorolac (ACULAR) 0 5 % ophthalmic solution INSTILL 1 DROP INTO RIGHT EYE 4 TIMES A DAY     • metFORMIN (GLUCOPHAGE-XR) 500 mg 24 hr tablet TAKE 1 TABLET BY MOUTH DAILY  WITH DINNER 90 tablet 3   • Multiple Vitamins-Iron (DAILY MULTIPLE VITAMIN/IRON PO) Take by mouth     • Omega-3 Fatty Acids (FISH OIL) 1000 MG CPDR Take by mouth     • repaglinide (PRANDIN) 1 mg tablet Take 1 tablet (1 mg total) by mouth 2 (two) times a day before meals 180 tablet 1   • simvastatin (ZOCOR) 10 mg tablet TAKE 1 TABLET BY MOUTH DAILY AT  BEDTIME 90 tablet 1   • vilazodone (VIIBRYD) 20 mg tablet TAKE 1 TABLET BY MOUTH  DAILY 90 tablet 3     No current facility-administered medications for this visit  Allergies   Allergen Reactions   • Pollen Extract Allergic Rhinitis      Immunizations:     Immunization History   Administered Date(s) Administered   • COVID-19 PFIZER VACCINE 0 3 ML IM 02/08/2021, 03/01/2021, 12/18/2021   • INFLUENZA 09/14/2009, 09/19/2011   • Influenza Quadrivalent Preservative Free 3 years and older IM 09/10/2012, 10/08/2013, 09/16/2014   • Influenza Split High Dose Preservative Free IM 10/27/2015, 11/02/2016, 11/30/2017, 10/18/2018   • Influenza, high dose seasonal 0 7 mL 10/11/2019, 09/08/2020, 11/04/2021, 11/04/2022   • Pneumococcal Conjugate 13-Valent 10/27/2015   • Pneumococcal Polysaccharide PPV23 11/14/2007   • Td (adult), adsorbed 05/04/2007   • Tdap 01/11/2011      Health Maintenance: There are no preventive care reminders to display for this patient  There are no preventive care reminders to display for this patient  Medicare Screening Tests and Risk Assessments:     Santino Zhang is here for his Subsequent Wellness visit   Last Medicare Wellness visit information reviewed, patient interviewed, no change since last AWV  Health Risk Assessment:   Patient rates overall health as fair  Patient feels that their physical health rating is slightly worse  Patient is satisfied with their life  Eyesight was rated as slightly worse  Hearing was rated as same  Patient feels that their emotional and mental health rating is same  Patients states they are never, rarely angry  Patient states they are sometimes unusually tired/fatigued  Pain experienced in the last 7 days has been some  Patient's pain rating has been 6/10  Patient states that he has experienced no weight loss or gain in last 6 months  Depression Screening:   PHQ-9 Score: 0      Fall Risk Screening: In the past year, patient has experienced: history of falling in past year    Number of falls: 2 or more  Injured during fall?: Yes    Feels unsteady when standing or walking?: Yes    Worried about falling?: No      Home Safety:  Patient has trouble with stairs inside or outside of their home  Patient has working smoke alarms and has working carbon monoxide detector  Home safety hazards include: none  Nutrition:   Current diet is Diabetic  Medications:   Patient is currently taking over-the-counter supplements  OTC medications include: see medication list  Patient is able to manage medications  Activities of Daily Living (ADLs)/Instrumental Activities of Daily Living (IADLs):   Walk and transfer into and out of bed and chair?: Yes  Dress and groom yourself?: Yes    Bathe or shower yourself?: Yes    Feed yourself? Yes  Do your laundry/housekeeping?: Yes  Manage your money, pay your bills and track your expenses?: Yes  Make your own meals?: Yes    Do your own shopping?: Yes    Previous Hospitalizations:   Any hospitalizations or ED visits within the last 12 months?: Yes    How many hospitalizations have you had in the last year?: 1-2    Advance Care Planning:   Living will: Yes    Durable POA for healthcare: No    Advanced directive:  Yes "    Cognitive Screening:   Provider or family/friend/caregiver concerned regarding cognition?: No    PREVENTIVE SCREENINGS      Cardiovascular Screening:    General: Screening Not Indicated, History Lipid Disorder and Risks and Benefits Discussed    Due for: Lipid Panel      Diabetes Screening:     General: Screening Not Indicated, History Diabetes and Risks and Benefits Discussed    Due for: Blood Glucose      Colorectal Cancer Screening:     General: Screening Not Indicated      Prostate Cancer Screening:    General: Screening Not Indicated      Osteoporosis Screening:    General: Risks and Benefits Discussed    Due for: Bone Density Ultrasound      Abdominal Aortic Aneurysm (AAA) Screening:        General: Screening Not Indicated      Lung Cancer Screening:     General: Screening Not Indicated      Hepatitis C Screening:    General: Risks and Benefits Discussed and Patient Declines    Screening, Brief Intervention, and Referral to Treatment (SBIRT)    Screening  Typical number of drinks in a day: 0  Typical number of drinks in a week: 0  Interpretation: Low risk drinking behavior  Single Item Drug Screening:  How often have you used an illegal drug (including marijuana) or a prescription medication for non-medical reasons in the past year? never    Single Item Drug Screen Score: 0  Interpretation: Negative screen for possible drug use disorder    Brief Intervention  Alcohol & drug use screenings were reviewed  No concerns regarding substance use disorder identified  No results found  Physical Exam:     /76   Pulse 63   Temp 98 9 °F (37 2 °C)   Resp 17   Ht 5' 11\" (1 803 m)   Wt 101 kg (222 lb 6 4 oz)   BMI 31 02 kg/m²     Physical Exam  Constitutional:       Appearance: He is well-developed  HENT:      Head: Normocephalic and atraumatic        Right Ear: External ear normal       Left Ear: External ear normal       Nose: Nose normal    Eyes:      Conjunctiva/sclera: Conjunctivae normal     " Pupils: Pupils are equal, round, and reactive to light  Neck:      Thyroid: No thyromegaly  Cardiovascular:      Rate and Rhythm: Normal rate and regular rhythm  Heart sounds: Normal heart sounds  Pulmonary:      Effort: Pulmonary effort is normal       Breath sounds: Normal breath sounds  No wheezing  Abdominal:      General: Bowel sounds are normal  There is no distension  Palpations: Abdomen is soft  There is no mass  Tenderness: There is no abdominal tenderness  There is no guarding  Musculoskeletal:         General: No tenderness  Normal range of motion  Cervical back: Normal range of motion and neck supple  Skin:     General: Skin is warm and dry  Capillary Refill: Capillary refill takes less than 2 seconds  Findings: No erythema  Neurological:      Mental Status: He is alert and oriented to person, place, and time  Psychiatric:         Behavior: Behavior normal          Thought Content:  Thought content normal          Judgment: Judgment normal           Recent Results (from the past 672 hour(s))   Olga Byrd Default    Collection Time: 06/20/23  8:58 AM   Result Value Ref Range    White Blood Cell Count 6 0 3 4 - 10 8 x10E3/uL    Red Blood Cell Count 4 77 4 14 - 5 80 x10E6/uL    Hemoglobin 14 7 13 0 - 17 7 g/dL    HCT 42 2 37 5 - 51 0 %    MCV 89 79 - 97 fL    MCH 30 8 26 6 - 33 0 pg    MCHC 34 8 31 5 - 35 7 g/dL    RDW 12 6 11 6 - 15 4 %    Platelet Count 104 504 - 450 x10E3/uL    Neutrophils 55 Not Estab  %    Lymphocytes 29 Not Estab  %    Monocytes 11 Not Estab  %    Eosinophils 4 Not Estab  %    Basophils PCT 1 Not Estab  %    Neutrophils (Absolute) 3 4 1 4 - 7 0 x10E3/uL    Lymphocytes (Absolute) 1 7 0 7 - 3 1 x10E3/uL    Monocytes (Absolute) 0 6 0 1 - 0 9 x10E3/uL    Eosinophils (Absolute) 0 2 0 0 - 0 4 x10E3/uL    Basophils ABS 0 1 0 0 - 0 2 x10E3/uL    Immature Granulocytes 0 Not Estab  %    Immature Granulocytes (Absolute) 0 0 0 0 - 0 1 x10E3/uL Comprehensive metabolic panel    Collection Time: 06/20/23  8:58 AM   Result Value Ref Range    Glucose, Random 205 (H) 70 - 99 mg/dL    BUN 14 8 - 27 mg/dL    Creatinine 1 34 (H) 0 76 - 1 27 mg/dL    eGFR 51 (L) >59 mL/min/1 73    SL AMB BUN/CREATININE RATIO 10 10 - 24    Sodium 143 134 - 144 mmol/L    Potassium 4 1 3 5 - 5 2 mmol/L    Chloride 104 96 - 106 mmol/L    CO2 23 20 - 29 mmol/L    CALCIUM 9 1 8 6 - 10 2 mg/dL    Protein, Total 6 7 6 0 - 8 5 g/dL    Albumin 4 3 3 6 - 4 6 g/dL    Globulin, Total 2 4 1 5 - 4 5 g/dL    Albumin/Globulin Ratio 1 8 1 2 - 2 2    TOTAL BILIRUBIN 0 5 0 0 - 1 2 mg/dL    Alk Phos Isoenzymes 61 44 - 121 IU/L    AST 17 0 - 40 IU/L    ALT 14 0 - 44 IU/L   [facility] Kidney Stone Panel    Collection Time: 06/20/23  8:58 AM   Result Value Ref Range    Interpretation Note    Hemoglobin A1c (w/out EAG)    Collection Time: 06/20/23  8:58 AM   Result Value Ref Range    Hemoglobin A1C 8 1 (H) 4 8 - 5 6 %         Gwendalyn Goodpasture, DO

## 2023-06-27 NOTE — ASSESSMENT & PLAN NOTE
Will increase the prandin to 1 mg with the two biggest meals of the day - postrprandial sugar w the 0 5 is in the 160's  Lab Results   Component Value Date    HGBA1C 8 1 (H) 06/20/2023

## 2023-06-28 ENCOUNTER — TELEPHONE (OUTPATIENT)
Dept: ADMINISTRATIVE | Facility: OTHER | Age: 88
End: 2023-06-28

## 2023-06-28 NOTE — TELEPHONE ENCOUNTER
Upon review of the In Basket request and the patient's chart, initial outreach has been made via fax to facility  Please see Contacts section for details       Thank you  Yariel Giraldo MA

## 2023-06-28 NOTE — TELEPHONE ENCOUNTER
----- Message from Hanna De Anda DO sent at 6/27/2023  1:43 PM EDT -----  Regarding: eye  06/27/23 1:43 PM    Hello, our patient Shahida Alvarenga has had Diabetic Eye Exam completed/performed  Please assist in updating the patient chart by making an External outreach to Dr Sagar Kearney facility located in [facility] [place]  The date of service is recent      Thank you,  Hanna De Anda DO  [facility]

## 2023-06-28 NOTE — LETTER
Diabetic Eye Exam Form    Date Requested: 23  Patient: Cornelious Apley  Patient : [id number]   Referring Provider: Torri Snell DO      DIABETIC Eye Exam Date _______________________________      Type of Exam MUST be documented for Diabetic Eye Exams  Please CHECK ONE  Retinal Exam       Dilated Retinal Exam       OCT       Optomap-Iris Exam      Fundus Photography       Left Eye - Please check Retinopathy or No Retinopathy        Exam did show retinopathy    Exam did not show retinopathy       Right Eye - Please check Retinopathy or No Retinopathy       Exam did show retinopathy    Exam did not show retinopathy       Comments __________________________________________________________    Practice Providing Exam ______________________________________________    Exam Performed By (print name) _______________________________________      Provider Signature ___________________________________________________      These reports are needed for  compliance  Please fax this completed form and a copy of the Diabetic Eye Exam report to our office located at [place] [address] as soon as possible via Fax [phone] attention [place]: Phone [phone]  We thank you for your assistance in treating our mutual patient

## 2023-06-29 NOTE — TELEPHONE ENCOUNTER
Upon review of the In Basket request we were able to locate, review, and update the patient chart as requested for Diabetic Eye Exam     Any additional questions or concerns should be emailed to the Practice Liaisons via the appropriate education email address, please do not reply via In Basket      Thank you  Leha Jackson MA

## 2023-07-13 DIAGNOSIS — R42 VERTIGO: Primary | ICD-10-CM

## 2023-07-13 RX ORDER — MECLIZINE HYDROCHLORIDE 25 MG/1
25 TABLET ORAL EVERY 8 HOURS PRN
Qty: 30 TABLET | Refills: 0 | Status: SHIPPED | OUTPATIENT
Start: 2023-07-13

## 2023-07-14 DIAGNOSIS — I10 HYPERTENSION, UNSPECIFIED TYPE: ICD-10-CM

## 2023-07-14 RX ORDER — AMLODIPINE BESYLATE AND BENAZEPRIL HYDROCHLORIDE 5; 10 MG/1; MG/1
1 CAPSULE ORAL DAILY
Qty: 90 CAPSULE | Refills: 1 | Status: SHIPPED | OUTPATIENT
Start: 2023-07-14

## 2023-07-18 DIAGNOSIS — H81.10 BENIGN PAROXYSMAL POSITIONAL VERTIGO, UNSPECIFIED LATERALITY: Primary | ICD-10-CM

## 2023-08-10 ENCOUNTER — TELEPHONE (OUTPATIENT)
Dept: NEPHROLOGY | Facility: CLINIC | Age: 88
End: 2023-08-10

## 2023-08-10 NOTE — TELEPHONE ENCOUNTER
Pt's wife called to cancel 8/23/23 appt with Dr. Cindy Hammond. Pt's wife did not want to reschedule at the moment. She will call back to reschedule.

## 2023-08-30 LAB
CREAT ?TM UR-SCNC: 107.4 UMOL/L
EXT ALBUMIN URINE RANDOM: 71.2
MICROALBUMIN/CREAT UR: 66 MG/G{CREAT}

## 2023-08-31 LAB
ALBUMIN SERPL-MCNC: 4.2 G/DL (ref 3.7–4.7)
ALBUMIN/CREAT UR: 66 MG/G CREAT (ref 0–29)
ALBUMIN/GLOB SERPL: 1.9 {RATIO} (ref 1.2–2.2)
ALP SERPL-CCNC: 63 IU/L (ref 44–121)
ALT SERPL-CCNC: 17 IU/L (ref 0–44)
AST SERPL-CCNC: 17 IU/L (ref 0–40)
BASOPHILS # BLD AUTO: 0 X10E3/UL (ref 0–0.2)
BASOPHILS NFR BLD AUTO: 0 %
BILIRUB SERPL-MCNC: 0.7 MG/DL (ref 0–1.2)
BUN SERPL-MCNC: 15 MG/DL (ref 8–27)
BUN/CREAT SERPL: 12 (ref 10–24)
CALCIUM SERPL-MCNC: 9 MG/DL (ref 8.6–10.2)
CHLORIDE SERPL-SCNC: 102 MMOL/L (ref 96–106)
CHOLEST SERPL-MCNC: 151 MG/DL (ref 100–199)
CO2 SERPL-SCNC: 21 MMOL/L (ref 20–29)
CREAT SERPL-MCNC: 1.27 MG/DL (ref 0.76–1.27)
CREAT UR-MCNC: 109 MG/DL
EGFR: 55 ML/MIN/1.73
EOSINOPHIL # BLD AUTO: 0.2 X10E3/UL (ref 0–0.4)
EOSINOPHIL NFR BLD AUTO: 2 %
ERYTHROCYTE [DISTWIDTH] IN BLOOD BY AUTOMATED COUNT: 13.4 % (ref 11.6–15.4)
GLOBULIN SER-MCNC: 2.2 G/DL (ref 1.5–4.5)
GLUCOSE SERPL-MCNC: 163 MG/DL (ref 70–99)
HBA1C MFR BLD: 8.5 % (ref 4.8–5.6)
HCT VFR BLD AUTO: 40.3 % (ref 37.5–51)
HDLC SERPL-MCNC: 38 MG/DL
HGB BLD-MCNC: 13.9 G/DL (ref 13–17.7)
IMM GRANULOCYTES # BLD: 0 X10E3/UL (ref 0–0.1)
IMM GRANULOCYTES NFR BLD: 0 %
LDLC SERPL CALC-MCNC: 62 MG/DL (ref 0–99)
LYMPHOCYTES # BLD AUTO: 1.6 X10E3/UL (ref 0.7–3.1)
LYMPHOCYTES NFR BLD AUTO: 23 %
MCH RBC QN AUTO: 30.3 PG (ref 26.6–33)
MCHC RBC AUTO-ENTMCNC: 34.5 G/DL (ref 31.5–35.7)
MCV RBC AUTO: 88 FL (ref 79–97)
MICROALBUMIN UR-MCNC: 71.7 UG/ML
MICRODELETION SYND BLD/T FISH: NORMAL
MICRODELETION SYND BLD/T FISH: NORMAL
MONOCYTES # BLD AUTO: 0.7 X10E3/UL (ref 0.1–0.9)
MONOCYTES NFR BLD AUTO: 10 %
NEUTROPHILS # BLD AUTO: 4.6 X10E3/UL (ref 1.4–7)
NEUTROPHILS NFR BLD AUTO: 65 %
PLATELET # BLD AUTO: 158 X10E3/UL (ref 150–450)
POTASSIUM SERPL-SCNC: 4 MMOL/L (ref 3.5–5.2)
PROT SERPL-MCNC: 6.4 G/DL (ref 6–8.5)
RBC # BLD AUTO: 4.59 X10E6/UL (ref 4.14–5.8)
SL AMB VLDL CHOLESTEROL CALC: 51 MG/DL (ref 5–40)
SODIUM SERPL-SCNC: 141 MMOL/L (ref 134–144)
TRIGL SERPL-MCNC: 323 MG/DL (ref 0–149)
WBC # BLD AUTO: 7.1 X10E3/UL (ref 3.4–10.8)

## 2023-09-01 ENCOUNTER — TELEPHONE (OUTPATIENT)
Dept: OTHER | Facility: HOSPITAL | Age: 88
End: 2023-09-01

## 2023-09-01 ENCOUNTER — DOCUMENTATION (OUTPATIENT)
Dept: NEPHROLOGY | Facility: CLINIC | Age: 88
End: 2023-09-01

## 2023-09-01 DIAGNOSIS — N18.31 STAGE 3A CHRONIC KIDNEY DISEASE (HCC): Primary | ICD-10-CM

## 2023-09-01 LAB
CALCIUM SERPL-MCNC: 9.2 MG/DL (ref 8.3–10.1)
CREAT ?TM UR-SCNC: 107.4 UMOL/L
EXT ALBUMIN URINE RANDOM: 71.2
EXT DIFF-ABS BASOPHILS: 0.1
EXT DIFF-ABS EOSINOPHILS: 0.2
EXT DIFF-ABS LYMPHOCYTES: 1.6
EXT DIFF-ABS MONOCYTES: 0.7
EXT DIFF-ABS NEUTROPHILS: 4.4
EXT GLUCOSE BLD: 165
EXTERNAL BUN: 15
EXTERNAL CHLORIDE: 103
EXTERNAL CO2: 20
EXTERNAL CREATININE: 1.3
EXTERNAL HEMATOCRIT: 41 %
EXTERNAL HEMOGLOBIN: 14.1 G/DL
EXTERNAL MCV: 87
EXTERNAL PHOSPHORUS: 3.8
EXTERNAL PLATELET COUNT: 156 K/ÂΜL
EXTERNAL POTASSIUM: 4
EXTERNAL PTH: 27
EXTERNAL RBC: 4.68
EXTERNAL RDW: 13.3
EXTERNAL SODIUM: 141
EXTERNAL VITAMIN D,25: 34.2
EXTERNAL WBC: 6.9
MICROALBUMIN/CREAT UR: 66 MG/G{CREAT}

## 2023-09-01 NOTE — TELEPHONE ENCOUNTER
Please let him know creatinine 1.3 stable. Bicarb level slightly lower, will continue to monitor for now   Please have him do repeat BMP in 1 month.

## 2023-09-01 NOTE — TELEPHONE ENCOUNTER
Left message with patient advising:Please let him know creatinine 1.3 stable. Bicarb level slightly lower, will continue to monitor for now   Please have him do repeat BMP in 1 month.

## 2023-09-06 DIAGNOSIS — I10 BENIGN ESSENTIAL HYPERTENSION: ICD-10-CM

## 2023-09-06 RX ORDER — CARVEDILOL PHOSPHATE 20 MG/1
CAPSULE, EXTENDED RELEASE ORAL
Qty: 90 CAPSULE | Refills: 3 | Status: SHIPPED | OUTPATIENT
Start: 2023-09-06

## 2023-09-23 ENCOUNTER — DOCUMENTATION (OUTPATIENT)
Dept: FAMILY MEDICINE CLINIC | Facility: CLINIC | Age: 88
End: 2023-09-23

## 2023-09-26 ENCOUNTER — OFFICE VISIT (OUTPATIENT)
Dept: PODIATRY | Facility: CLINIC | Age: 88
End: 2023-09-26
Payer: COMMERCIAL

## 2023-09-26 VITALS — RESPIRATION RATE: 17 BRPM | HEIGHT: 71 IN | BODY MASS INDEX: 31.08 KG/M2 | WEIGHT: 222 LBS

## 2023-09-26 DIAGNOSIS — L84 CORNS: ICD-10-CM

## 2023-09-26 DIAGNOSIS — E11.42 DIABETIC POLYNEUROPATHY ASSOCIATED WITH TYPE 2 DIABETES MELLITUS (HCC): Primary | ICD-10-CM

## 2023-09-26 DIAGNOSIS — E11.42 DIABETIC POLYNEUROPATHY ASSOCIATED WITH TYPE 2 DIABETES MELLITUS (HCC): ICD-10-CM

## 2023-09-26 DIAGNOSIS — M79.672 PAIN IN BOTH FEET: ICD-10-CM

## 2023-09-26 DIAGNOSIS — M79.671 PAIN IN BOTH FEET: ICD-10-CM

## 2023-09-26 DIAGNOSIS — I70.209 PERIPHERAL ARTERIOSCLEROSIS (HCC): ICD-10-CM

## 2023-09-26 PROCEDURE — 11056 PARNG/CUTG B9 HYPRKR LES 2-4: CPT | Performed by: PODIATRIST

## 2023-09-26 RX ORDER — REPAGLINIDE 1 MG/1
1 TABLET ORAL
Qty: 180 TABLET | Refills: 1 | Status: SHIPPED | OUTPATIENT
Start: 2023-09-26 | End: 2024-03-24

## 2023-09-26 NOTE — PROGRESS NOTES
Assessment.  Diabetic neuropathy.  Pain upon ambulation.  Peripheral artery disease.  Mycosis of nail.  Callus formation.  Foot deformity.     Plan.  Foot exam performed.  Patient educated on care of the diabetic foot.  All mycotic nails debrided.  Calluses debrided without pain or complication.        Subjective:  patient, a diabetic white male, complains of pain in his feet with ambulation.  No history of trauma     History of Present Illness  HPI: Patient is a diabetic with pain in his feet with ambulation. No history of trauma. Patient is pre-ulcerative calluses. These hurt with ambulation.      Review of Systems   Constitutional: feeling poorly, but-- as noted in HPI,-- no fever-- and-- no chills.   ENT: nasal discharge, but-- as noted in HPI.  Cardiovascular: no complaints of slow or fast heart rate, no chest pain, no palpitations, no leg claudication or lower extremity edema.  Respiratory: cough, but-- as noted in HPI.  Gastrointestinal: no complaints of abdominal pain, no constipation, no nausea or vomiting, no diarrhea or bloody stools.  Genitourinary: no complaints of dysuria or incontinence, no hesitancy, no nocturia, no genital lesion, no inadequacy of penile erection.  Musculoskeletal: no complaints of arthralgia, no myalgia, no joint swelling or stiffness, no limb pain or swelling.  Integumentary: no complaints of skin rash or lesion, no itching or dry skin, no skin wounds.      Active Problems  1. Abnormal EKG (794.31) (R94.31)   2. Acquired ankle/foot deformity (736.70) (M21.969)   3. Acute knee pain, right   4. Basal cell carcinoma of skin (173.91) (C44.91)   5. Benign essential hypertension (401.1) (I10)   6. BMI 31.0-31.9,adult (V85.31) (Z68.31)   7. Callus (700) (L84)   8. Cough (786.2) (R05)   9. Deformity of ankle and foot, acquired (736.70) (M21.969)   10. Diabetes mellitus with neuropathy (250.60,357.2) (E11.40)   11. Diabetic neuropathy (250.60,357.2) (E11.40)   12. Dizziness (780.4) (R42)   13. Encounter for prostate cancer screening (V76.44) (Z12.5)   14. Encounter for screening for cardiovascular disorders (V81.2) (Z13.6)   15. Encounter for screening for malignant neoplasm of colon (V76.51) (Z12.11)   16. Encounter for screening for malignant neoplasm of prostate (V76.44) (Z12.5)   17. Hypercholesterolemia (272.0) (E78.00)   18. Hypokalemia (276.8) (E87.6)   19. Limb pain (729.5) (M79.609)   20. Medicare annual wellness visit, initial (V70.0) (Z00.00)   21. Minor depression (311) (F32.9)   22. Need for vaccination (V05.9) (Z23)   23. Never a smoker   24. Non-healing ulcer (707.9) (L98.499)   25. Obesity, Class I, BMI 30-34.9 (278.00) (E66.9)   26. Onychomycosis (110.1) (B35.1)   27. Open wound of foot, left, subsequent encounter (V58.89,892.0) (S91.302D)   28. Pain in both feet (729.5) (M79.671,M79.672)   29. Primary dysthymia (300.4) (F34.1)   30. Screening for hypothyroidism (V77.0) (Z13.29)   31. Wound of skin (782.9) (R23.8)     Past Medical History   · History of Acute upper respiratory infection (465.9) (J06.9)   · History of Ascending cholangitis (576.1) (K83.0)   · History of Atherosclerosis of arteries of extremities (440.20) (I70.209)   · History of Bug bite with infection (919.5,E906.4) (W57.XXXA)   · History of Callus (700) (L84)   · History of Need for vaccination (V05.9) (Z23)   · Screening for genitourinary condition (V81.6) (Z13.89)   · History of Screening for genitourinary condition (V81.6) (Z13.89)     The active problems and past medical history were reviewed and updated today.      Surgical History      · History of Appendectomy   · History of Cholecystectomy   · History of Open Treatment Of Fracture Of The Radial Shaft   · History of Tonsillectomy     The surgical history was reviewed and updated today.       Family History  Mother    · Family history of Heart disease   · Family history of HTN (hypertension)   · Family history of Liver cancer   · Family history of Melanoma  Father    · Family history of    · Family history of Gastric ulcer  Brother    · Family history of Diabetes  Grandparent    · Family history of Diabetes     Social History      · Never a smoker  The social history was reviewed and updated today.    The medication list was reviewed and updated today.      Allergies  1. No Known Drug Allergies         Physical Exam  Left Foot: Appearance: Normal except as noted: excessive pronation-- and-- pes planus. Great toe deformities include a bunion. Tenderness: None except the great toe. Right Foot: Appearance: Normal except as noted: excessive pronation-- and-- pes planus. Great toe deformities include a bunion. Tenderness: None except the great toe. Left Ankle: ROM: limited ROM in all planes Motor: diffuse weakness. Right Ankle: ROM: limited ROM in all planes Motor: diffuse weakness. Neurological Exam: performed. Light touch was decreased bilaterally. Vibratory sensation was decreased in both first metatarsophalangeal joints. Vascular Exam: performed Dorsalis pedis pulses were One/4 bilaterally. Posterior tibial pulses were One/4 bilaterally. Elevation Pallor: present bilaterally. Capillary refill time was greater than 3 seconds bilaterally-- and-- Q 9, findings bilateral. Negative digital hair. Edema: mild bilaterally-- and-- No Homans sign. Toenails: All of the toenails were elongated,-- hypertrophied,-- discolored,-- shown to have subungual debris-- and-- Mycotic.    Socks and shoes removed, Right Foot Findings: erythematous and dry. Diminished tactile sensation with monofilament testing throughout the right foot.  Socks and shoes removed, Left Foot Findings: erythematous and dry.  Diminished tactile sensation with monofilament testing throughout the left foot. Capillary refills findings on the right were delayed in the toes.  Pulses:  1+ in the posterior tibialis on the right  1+ in the dorsalis pedis on the right.  Capillary refills findings on the left were delayed in the toes.  Pulses:  1+ in the posterior tibialis on the left  1+ in the dorsalis pedis on the left.  Assign Risk Category: 2: Loss of protective sensation with or without weakness, deformity, callus, pre-ulcer, or history of ulceration. High risk. Hyperkeratosis: present on both first toes,-- present on both first sub metatarsals-- and-- Pinch callus, bilateral.   Shoe Gear Evaluation: performed (). Right Foot: width: E.-- and-- length: 12. Left Foot: width: E.-- and-- length: 12. Recommendation(s): custom inlays.     Patient's shoes and socks removed. Right Foot/Ankle   Right Foot Inspection  Skin Exam: callus and callus               Toe Exam: swelling and erythema  Sensory   Vibration: diminished  Proprioception: diminished      Vascular  Capillary refills: elevated  The right DP pulse is 1+. The right PT pulse is 1+.      Left Foot/Ankle  Left Foot Inspection  Skin Exam: callus              Toe Exam: swelling and erythema                   Sensory   Vibration: diminished  Proprioception: diminished     Vascular  Capillary refills: elevated  The left DP pulse is 1+.  The left PT pulse is 1+.         Patient's shoes and socks removed.           Assign Risk Category  Deformity present  Loss of protective sensation  Weak pulses  Risk: 2

## 2023-10-12 DIAGNOSIS — E11.42 DIABETIC POLYNEUROPATHY ASSOCIATED WITH TYPE 2 DIABETES MELLITUS (HCC): ICD-10-CM

## 2023-10-12 RX ORDER — REPAGLINIDE 1 MG/1
1 TABLET ORAL
Qty: 180 TABLET | Refills: 1 | Status: SHIPPED | OUTPATIENT
Start: 2023-10-12 | End: 2024-04-09

## 2023-11-30 DIAGNOSIS — I10 HYPERTENSION, UNSPECIFIED TYPE: ICD-10-CM

## 2023-12-01 RX ORDER — AMLODIPINE BESYLATE AND BENAZEPRIL HYDROCHLORIDE 5; 10 MG/1; MG/1
1 CAPSULE ORAL DAILY
Qty: 90 CAPSULE | Refills: 1 | Status: SHIPPED | OUTPATIENT
Start: 2023-12-01

## 2023-12-05 ENCOUNTER — OFFICE VISIT (OUTPATIENT)
Age: 88
End: 2023-12-05
Payer: COMMERCIAL

## 2023-12-05 VITALS
WEIGHT: 222 LBS | HEART RATE: 76 BPM | DIASTOLIC BLOOD PRESSURE: 71 MMHG | BODY MASS INDEX: 31.08 KG/M2 | HEIGHT: 71 IN | SYSTOLIC BLOOD PRESSURE: 123 MMHG

## 2023-12-05 DIAGNOSIS — I70.209 PERIPHERAL ARTERIOSCLEROSIS (HCC): ICD-10-CM

## 2023-12-05 DIAGNOSIS — L84 CORNS: ICD-10-CM

## 2023-12-05 DIAGNOSIS — M79.672 PAIN IN BOTH FEET: ICD-10-CM

## 2023-12-05 DIAGNOSIS — M79.671 PAIN IN BOTH FEET: ICD-10-CM

## 2023-12-05 DIAGNOSIS — E11.42 DIABETIC POLYNEUROPATHY ASSOCIATED WITH TYPE 2 DIABETES MELLITUS (HCC): Primary | ICD-10-CM

## 2023-12-05 PROCEDURE — 11056 PARNG/CUTG B9 HYPRKR LES 2-4: CPT | Performed by: PODIATRIST

## 2023-12-05 NOTE — PROGRESS NOTES
Assessment. Diabetic neuropathy. Pain upon ambulation. Peripheral artery disease. Mycosis of nail. Callus formation. Foot deformity. Plan. Chart reviewed. Foot exam performed. Patient educated on care of the diabetic foot. All mycotic nails debrided. Nails debrided mechanically with nail nipper. Calluses debrided without pain or complication. Aftercare instruction given. Return for follow-up. Subjective:  patient, a diabetic white male, complains of pain in his feet with ambulation. No history of trauma     History of Present Illness  HPI: Patient is a diabetic with pain in his feet with ambulation. No history of trauma. Patient is pre-ulcerative calluses. These hurt with ambulation. Review of Systems   Constitutional: feeling poorly, but-- as noted in HPI,-- no fever-- and-- no chills. ENT: nasal discharge, but-- as noted in HPI. Cardiovascular: no complaints of slow or fast heart rate, no chest pain, no palpitations, no leg claudication or lower extremity edema. Respiratory: cough, but-- as noted in HPI. Gastrointestinal: no complaints of abdominal pain, no constipation, no nausea or vomiting, no diarrhea or bloody stools. Genitourinary: no complaints of dysuria or incontinence, no hesitancy, no nocturia, no genital lesion, no inadequacy of penile erection. Musculoskeletal: no complaints of arthralgia, no myalgia, no joint swelling or stiffness, no limb pain or swelling. Integumentary: no complaints of skin rash or lesion, no itching or dry skin, no skin wounds. Active Problems  1. Abnormal EKG (794.31) (R94.31)   2. Acquired ankle/foot deformity (736.70) (M21.969)   3. Acute knee pain, right   4. Basal cell carcinoma of skin (173.91) (C44.91)   5. Benign essential hypertension (401.1) (I10)   6. BMI 31.0-31.9,adult (V85.31) (Z68.31)   7. Callus (700) (L84)   8. Cough (786.2) (R05)   9. Deformity of ankle and foot, acquired (736.70) (M21.969)   10.  Diabetes mellitus with neuropathy (250.60,357.2) (E11.40)   11. Diabetic neuropathy (250.60,357.2) (E11.40)   12. Dizziness (780.4) (R42)   13. Encounter for prostate cancer screening (V76.44) (Z12.5)   14. Encounter for screening for cardiovascular disorders (V81.2) (Z13.6)   15. Encounter for screening for malignant neoplasm of colon (V76.51) (Z12.11)   16. Encounter for screening for malignant neoplasm of prostate (V76.44) (Z12.5)   17. Hypercholesterolemia (272.0) (E78.00)   18. Hypokalemia (276.8) (E87.6)   19. Limb pain (729.5) (M79.609)   20. Medicare annual wellness visit, initial (V70.0) (Z00.00)   21. 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 Need for vaccination (V05.9) (Z23)   23. Never a smoker   24. Non-healing ulcer (707.9) (L98.499)   25. Obesity, Class I, BMI 30-34.9 (278.00) (E66.9)   26. Onychomycosis (110.1) (B35.1)   27. Open wound of foot, left, subsequent encounter (V58.89,892.0) (S91.302D)   28. Pain in both feet (729.5) (M79.671,M79.672)   29. Primary dysthymia (300.4) (F34.1)   30. Screening for hypothyroidism (V77.0) (Z13.29)   31. Wound of skin (782.9) (R23.8)     Past Medical History   · History of Acute upper respiratory infection (465.9) (J06.9)   · History of Ascending cholangitis (576.1) (K83.0)   · History of Atherosclerosis of arteries of extremities (440.20) (I70.209)   · History of Bug bite with infection (919.5,E906.4) (W57.XXXA)   · History of Callus (700) (L84)   · History of Need for vaccination (V05.9) (Z23)   · Screening for genitourinary condition (V81.6) (Z13.89)   · History of Screening for genitourinary condition (V81.6) (Z13.89)     The active problems and past medical history were reviewed and updated today. Surgical History      · History of Appendectomy   · History of Cholecystectomy   · History of Open Treatment Of Fracture Of The Radial Shaft   · History of Tonsillectomy     The surgical history was reviewed and updated today.        Family History  Mother    · Family history of Heart disease   · Family history of HTN (hypertension)   · Family history of Liver cancer   · Family history of Melanoma  Father    · Family history of    · Family history of Gastric ulcer  Brother    · Family history of Diabetes  Grandparent    · Family history of Diabetes     Social History      · Never a smoker  The social history was reviewed and updated today. The medication list was reviewed and updated today. Allergies  1. No Known Drug Allergies         Physical Exam  Left Foot: Appearance: Normal except as noted: excessive pronation-- and-- pes planus. Great toe deformities include a bunion. Tenderness: None except the great toe. Right Foot: Appearance: Normal except as noted: excessive pronation-- and-- pes planus. Great toe deformities include a bunion. Tenderness: None except the great toe. Left Ankle: ROM: limited ROM in all planes Motor: diffuse weakness. Right Ankle: ROM: limited ROM in all planes Motor: diffuse weakness. Neurological Exam: performed. Light touch was decreased bilaterally. Vibratory sensation was decreased in both first metatarsophalangeal joints. Vascular Exam: performed Dorsalis pedis pulses were One/4 bilaterally. Posterior tibial pulses were One/4 bilaterally. Elevation Pallor: present bilaterally. Capillary refill time was greater than 3 seconds bilaterally-- and-- Q 9, findings bilateral. Negative digital hair. Edema: mild bilaterally-- and-- No Homans sign. Toenails: All of the toenails were elongated,-- hypertrophied,-- discolored,-- shown to have subungual debris-- and-- Mycotic. Socks and shoes removed, Right Foot Findings: erythematous and dry. Diminished tactile sensation with monofilament testing throughout the right foot. Socks and shoes removed, Left Foot Findings: erythematous and dry. Diminished tactile sensation with monofilament testing throughout the left foot. Capillary refills findings on the right were delayed in the toes.   Pulses:  1+ in the posterior tibialis on the right  1+ in the dorsalis pedis on the right. Capillary refills findings on the left were delayed in the toes. Pulses:  1+ in the posterior tibialis on the left  1+ in the dorsalis pedis on the left. Assign Risk Category: 2: Loss of protective sensation with or without weakness, deformity, callus, pre-ulcer, or history of ulceration. High risk. Hyperkeratosis: present on both first toes,-- present on both first sub metatarsals-- and-- Pinch callus, bilateral.   Shoe Gear Evaluation: performed (). Right Foot: width: E.-- and-- length: 12. Left Foot: width: E.-- and-- length: 12. Recommendation(s): custom inlays. Patient's shoes and socks removed. Right Foot/Ankle   Right Foot Inspection  Skin Exam: callus and callus               Toe Exam: swelling and erythema  Sensory   Vibration: diminished  Proprioception: diminished      Vascular  Capillary refills: elevated  The right DP pulse is 1+. The right PT pulse is 1+. Left Foot/Ankle  Left Foot Inspection  Skin Exam: callus              Toe Exam: swelling and erythema                   Sensory   Vibration: diminished  Proprioception: diminished     Vascular  Capillary refills: elevated  The left DP pulse is 1+. The left PT pulse is 1+. Patient's shoes and socks removed. Right Foot/Ankle   Right Foot Inspection      Toe Exam: right toe deformity. Sensory   Vibration: diminished      Vascular  Capillary refills: < 3 seconds      Left Foot/Ankle  Left Foot Inspection      Toe Exam: left toe deformity.      Sensory   Vibration: diminished      Vascular  Capillary refills: < 3 seconds      Assign Risk Category  Deformity present  Loss of protective sensation  Weak pulses  Risk: 2

## 2024-02-06 DIAGNOSIS — E78.2 MIXED HYPERLIPIDEMIA: ICD-10-CM

## 2024-02-06 DIAGNOSIS — N18.30 BENIGN HYPERTENSION WITH CKD (CHRONIC KIDNEY DISEASE) STAGE III (HCC): ICD-10-CM

## 2024-02-06 DIAGNOSIS — E11.43 TYPE 2 DIABETES MELLITUS WITH DIABETIC AUTONOMIC NEUROPATHY, WITHOUT LONG-TERM CURRENT USE OF INSULIN (HCC): Primary | ICD-10-CM

## 2024-02-06 DIAGNOSIS — I12.9 BENIGN HYPERTENSION WITH CKD (CHRONIC KIDNEY DISEASE) STAGE III (HCC): ICD-10-CM

## 2024-02-21 ENCOUNTER — TELEPHONE (OUTPATIENT)
Dept: FAMILY MEDICINE CLINIC | Facility: CLINIC | Age: 89
End: 2024-02-21

## 2024-02-21 LAB
ALBUMIN SERPL-MCNC: 4.5 G/DL (ref 3.7–4.7)
ALBUMIN/CREAT UR: 121 MG/G CREAT (ref 0–29)
ALBUMIN/GLOB SERPL: 1.8 {RATIO} (ref 1.2–2.2)
ALP SERPL-CCNC: 64 IU/L (ref 44–121)
ALT SERPL-CCNC: 23 IU/L (ref 0–44)
AST SERPL-CCNC: 25 IU/L (ref 0–40)
BASOPHILS # BLD AUTO: 0 X10E3/UL (ref 0–0.2)
BASOPHILS NFR BLD AUTO: 1 %
BILIRUB SERPL-MCNC: 0.5 MG/DL (ref 0–1.2)
BUN SERPL-MCNC: 15 MG/DL (ref 8–27)
BUN/CREAT SERPL: 12 (ref 10–24)
CALCIUM SERPL-MCNC: 9.1 MG/DL (ref 8.6–10.2)
CHLORIDE SERPL-SCNC: 103 MMOL/L (ref 96–106)
CHOLEST SERPL-MCNC: 154 MG/DL (ref 100–199)
CO2 SERPL-SCNC: 24 MMOL/L (ref 20–29)
CREAT SERPL-MCNC: 1.28 MG/DL (ref 0.76–1.27)
CREAT UR-MCNC: 96.9 MG/DL
EGFR: 54 ML/MIN/1.73
EOSINOPHIL # BLD AUTO: 0.2 X10E3/UL (ref 0–0.4)
EOSINOPHIL NFR BLD AUTO: 3 %
ERYTHROCYTE [DISTWIDTH] IN BLOOD BY AUTOMATED COUNT: 12.6 % (ref 11.6–15.4)
GLOBULIN SER-MCNC: 2.5 G/DL (ref 1.5–4.5)
GLUCOSE SERPL-MCNC: 205 MG/DL (ref 70–99)
HBA1C MFR BLD: 8.5 % (ref 4.8–5.6)
HCT VFR BLD AUTO: 42.2 % (ref 37.5–51)
HDLC SERPL-MCNC: 37 MG/DL
HGB BLD-MCNC: 14.4 G/DL (ref 13–17.7)
IMM GRANULOCYTES # BLD: 0 X10E3/UL (ref 0–0.1)
IMM GRANULOCYTES NFR BLD: 0 %
LDLC SERPL CALC-MCNC: 69 MG/DL (ref 0–99)
LYMPHOCYTES # BLD AUTO: 1.9 X10E3/UL (ref 0.7–3.1)
LYMPHOCYTES NFR BLD AUTO: 30 %
MCH RBC QN AUTO: 30.4 PG (ref 26.6–33)
MCHC RBC AUTO-ENTMCNC: 34.1 G/DL (ref 31.5–35.7)
MCV RBC AUTO: 89 FL (ref 79–97)
MICROALBUMIN UR-MCNC: 117 UG/ML
MICRODELETION SYND BLD/T FISH: NORMAL
MICRODELETION SYND BLD/T FISH: NORMAL
MONOCYTES # BLD AUTO: 0.6 X10E3/UL (ref 0.1–0.9)
MONOCYTES NFR BLD AUTO: 9 %
NEUTROPHILS # BLD AUTO: 3.5 X10E3/UL (ref 1.4–7)
NEUTROPHILS NFR BLD AUTO: 57 %
PLATELET # BLD AUTO: 159 X10E3/UL (ref 150–450)
POTASSIUM SERPL-SCNC: 4.1 MMOL/L (ref 3.5–5.2)
PROT SERPL-MCNC: 7 G/DL (ref 6–8.5)
RBC # BLD AUTO: 4.74 X10E6/UL (ref 4.14–5.8)
SODIUM SERPL-SCNC: 143 MMOL/L (ref 134–144)
TRIGL SERPL-MCNC: 303 MG/DL (ref 0–149)
WBC # BLD AUTO: 6.1 X10E3/UL (ref 3.4–10.8)

## 2024-02-23 DIAGNOSIS — E11.42 DIABETIC POLYNEUROPATHY ASSOCIATED WITH TYPE 2 DIABETES MELLITUS (HCC): ICD-10-CM

## 2024-02-26 RX ORDER — GLIPIZIDE 10 MG/1
TABLET, FILM COATED, EXTENDED RELEASE ORAL
Qty: 90 TABLET | Refills: 0 | Status: SHIPPED | OUTPATIENT
Start: 2024-02-26

## 2024-02-27 ENCOUNTER — OFFICE VISIT (OUTPATIENT)
Dept: FAMILY MEDICINE CLINIC | Facility: CLINIC | Age: 89
End: 2024-02-27
Payer: COMMERCIAL

## 2024-02-27 ENCOUNTER — TELEPHONE (OUTPATIENT)
Age: 89
End: 2024-02-27

## 2024-02-27 VITALS
RESPIRATION RATE: 18 BRPM | BODY MASS INDEX: 31.22 KG/M2 | DIASTOLIC BLOOD PRESSURE: 72 MMHG | SYSTOLIC BLOOD PRESSURE: 144 MMHG | HEART RATE: 69 BPM | WEIGHT: 223 LBS | TEMPERATURE: 97.9 F | HEIGHT: 71 IN

## 2024-02-27 DIAGNOSIS — E87.6 HYPOKALEMIA: ICD-10-CM

## 2024-02-27 DIAGNOSIS — I10 HYPERTENSION, UNSPECIFIED TYPE: ICD-10-CM

## 2024-02-27 DIAGNOSIS — L98.9 SKIN LESION: ICD-10-CM

## 2024-02-27 DIAGNOSIS — I70.209 PERIPHERAL ARTERIOSCLEROSIS (HCC): ICD-10-CM

## 2024-02-27 DIAGNOSIS — E11.43 TYPE 2 DIABETES MELLITUS WITH DIABETIC AUTONOMIC NEUROPATHY, WITHOUT LONG-TERM CURRENT USE OF INSULIN (HCC): Primary | ICD-10-CM

## 2024-02-27 DIAGNOSIS — N18.31 STAGE 3A CHRONIC KIDNEY DISEASE (HCC): ICD-10-CM

## 2024-02-27 DIAGNOSIS — I12.9 BENIGN HYPERTENSION WITH CKD (CHRONIC KIDNEY DISEASE) STAGE III (HCC): ICD-10-CM

## 2024-02-27 DIAGNOSIS — N18.30 BENIGN HYPERTENSION WITH CKD (CHRONIC KIDNEY DISEASE) STAGE III (HCC): ICD-10-CM

## 2024-02-27 PROCEDURE — 99214 OFFICE O/P EST MOD 30 MIN: CPT | Performed by: FAMILY MEDICINE

## 2024-02-27 NOTE — ASSESSMENT & PLAN NOTE
Script for Americo sent.  If approved and acquired will stop prandin.  Get A1c in a month and follow.    Lab Results   Component Value Date    HGBA1C 8.5 (H) 02/20/2024

## 2024-02-27 NOTE — PROGRESS NOTES
Assessment/Plan:    1. 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 Benign hypertension with CKD (chronic kidney disease) stage III (HCC)    3. Hypertension, unspecified type    4. Stage 3a chronic kidney disease (HCC)    5. Peripheral arteriosclerosis (HCC)    6. Hypokalemia  Assessment & Plan:  Pt has been off the k for a while      7. Skin lesion  -     Ambulatory referral to Dermatology; Future    Pt will see DR Martínez for evaluation of skin lesions  Will start injection for DM  Raza;l stop prandin after the shot is approved  As I increase the injection I will stop orals - thios will help hios renal function as well as his elevated sugars        There are no Patient Instructions on file for this visit.    Return in 3 months (on 5/27/2024) for Diabetes Follow-up.    Subjective:      Patient ID: Danny Beckham is a 88 y.o. male.    Chief Complaint   Patient presents with   • Results     Review lab work lw cma       Pt is here at my request to review labs  Pt is doing well  Pt has some skin lesions for us to look at.           The following portions of the patient's history were reviewed and updated as appropriate: allergies, current medications, past family history, past medical history, past social history, past surgical history and problem list.    Review of Systems   Constitutional:  Negative for activity change, appetite change, chills, diaphoresis, fatigue, fever and unexpected weight change.   HENT:  Negative for congestion, dental problem, ear pain, mouth sores, sinus pressure, sinus pain,  sore throat and trouble swallowing.    Eyes:  Negative for photophobia, discharge and itching.   Respiratory:  Negative for apnea, chest tightness and shortness of breath.    Cardiovascular:  Negative for chest pain, palpitations and leg swelling.   Gastrointestinal:  Negative for abdominal distention, abdominal pain, blood in stool, nausea and vomiting.   Endocrine: Negative for cold intolerance, heat intolerance, polydipsia, polyphagia and polyuria.   Genitourinary:  Negative for difficulty urinating.   Musculoskeletal:  Negative for arthralgias.   Skin:  Negative for color change and wound.        Skin lesions   Neurological:  Negative for dizziness, syncope, speech difficulty and headaches.   Hematological:  Negative for adenopathy.   Psychiatric/Behavioral:  Negative for agitation and behavioral problems.          Current Outpatient Medications   Medication Sig Dispense Refill   • amLODIPine-benazepril (LOTREL 5-10) 5-10 MG per capsule TAKE 1 CAPSULE BY MOUTH DAILY 90 capsule 1   • Bioflavonoid Products (ZULEYKA-C) 500-200-60 MG TABS Take by mouth     • carvedilol (COREG CR) 20 MG 24 hr capsule TAKE 1 CAPSULE BY MOUTH  DAILY 90 capsule 3   • Cinnamon 500 MG TABS Take by mouth     • clotrimazole-betamethasone (LOTRISONE) 1-0.05 % cream Apply 1 application topically 2 (two) times a day To affected area 45 g 5   • fluticasone (FLONASE) 50 mcg/act nasal spray into each nostril     • glipiZIDE (GLUCOTROL XL) 10 mg 24 hr tablet TAKE 1 TABLET BY MOUTH DAILY 90 tablet 0   • glucose blood test strip by In Vitro route     • hydrocortisone 1 % cream Apply topically 4 (four) times a day as needed for irritation 120 g 0   • Januvia 50 MG tablet TAKE 1 TABLET BY MOUTH DAILY 90 tablet 3   • meclizine (ANTIVERT) 25 mg tablet Take 1 tablet (25 mg total) by mouth every 8 (eight) hours as needed for dizziness 30 tablet 0   • metFORMIN (GLUCOPHAGE-XR) 500 mg 24 hr tablet TAKE 1 TABLET BY MOUTH DAILY  WITH DINNER 90 tablet 3   •  "Multiple Vitamins-Iron (DAILY MULTIPLE VITAMIN/IRON PO) Take by mouth     • Omega-3 Fatty Acids (FISH OIL) 1000 MG CPDR Take by mouth     • repaglinide (PRANDIN) 1 mg tablet Take 1 tablet (1 mg total) by mouth 2 (two) times a day before meals 180 tablet 1   • simvastatin (ZOCOR) 10 mg tablet TAKE 1 TABLET BY MOUTH DAILY AT  BEDTIME 90 tablet 1   • tirzepatide (Mounjaro) 2.5 MG/0.5ML Inject 0.5 mL (2.5 mg total) under the skin every 7 days 2 mL 0   • vilazodone (VIIBRYD) 20 mg tablet TAKE 1 TABLET BY MOUTH  DAILY 90 tablet 3     No current facility-administered medications for this visit.       Objective:    /72   Pulse 69   Temp 97.9 °F (36.6 °C) (Tympanic)   Resp 18   Ht 5' 11\" (1.803 m)   Wt 101 kg (223 lb)   BMI 31.10 kg/m²        Physical Exam  Vitals and nursing note reviewed.   Constitutional:       General: He is not in acute distress.     Appearance: He is well-developed. He is not diaphoretic.   HENT:      Head: Normocephalic and atraumatic.      Right Ear: External ear normal.      Left Ear: External ear normal.      Nose: Nose normal.      Mouth/Throat:      Pharynx: No oropharyngeal exudate.   Eyes:      General: No scleral icterus.        Right eye: No discharge.         Left eye: No discharge.      Pupils: Pupils are equal, round, and reactive to light.   Neck:      Thyroid: No thyromegaly.   Cardiovascular:      Rate and Rhythm: Normal rate.      Heart sounds: Normal heart sounds. No murmur heard.  Pulmonary:      Effort: Pulmonary effort is normal. No respiratory distress.      Breath sounds: Normal breath sounds. No wheezing.   Abdominal:      General: Bowel sounds are normal. There is no distension.      Palpations: Abdomen is soft. There is no mass.      Tenderness: There is no abdominal tenderness. There is no guarding or rebound.   Musculoskeletal:         General: Normal range of motion.   Skin:     General: Skin is warm and dry.      Findings: No erythema or rash.   Neurological: "      Mental Status: He is alert.      Coordination: Coordination normal.      Deep Tendon Reflexes: Reflexes normal.   Psychiatric:         Behavior: Behavior normal.                Frank Lombardi, DO

## 2024-02-27 NOTE — TELEPHONE ENCOUNTER
PA for tirzepatide (Mounjaro) 2.5 MG/0.5ML     Submitted via    [][employer]   [x][employer]-Case ID # [id number]  []Faxed to plan   []Other website   []Phone call Case ID #     Office notes sent, clinical questions answered. Awaiting determination    Turnaround time for your insurance to make a decision on your Prior Authorization can take 7-21 business days.

## 2024-03-01 ENCOUNTER — OFFICE VISIT (OUTPATIENT)
Age: 89
End: 2024-03-01
Payer: COMMERCIAL

## 2024-03-01 VITALS
HEART RATE: 80 BPM | BODY MASS INDEX: 31.22 KG/M2 | SYSTOLIC BLOOD PRESSURE: 145 MMHG | RESPIRATION RATE: 17 BRPM | DIASTOLIC BLOOD PRESSURE: 77 MMHG | HEIGHT: 71 IN | WEIGHT: 223 LBS

## 2024-03-01 DIAGNOSIS — M79.672 PAIN IN BOTH FEET: ICD-10-CM

## 2024-03-01 DIAGNOSIS — L84 CORNS: ICD-10-CM

## 2024-03-01 DIAGNOSIS — I70.209 PERIPHERAL ARTERIOSCLEROSIS (HCC): ICD-10-CM

## 2024-03-01 DIAGNOSIS — M79.671 PAIN IN BOTH FEET: ICD-10-CM

## 2024-03-01 DIAGNOSIS — E11.42 DIABETIC POLYNEUROPATHY ASSOCIATED WITH TYPE 2 DIABETES MELLITUS (HCC): Primary | ICD-10-CM

## 2024-03-01 PROCEDURE — 11056 PARNG/CUTG B9 HYPRKR LES 2-4: CPT | Performed by: PODIATRIST

## 2024-03-01 NOTE — PROGRESS NOTES
Assessment.  Diabetic neuropathy.  Pain upon ambulation.  Peripheral artery disease.  Mycosis of nail.  Callus formation.  Foot deformity.     Plan.  Chart reviewed.  Foot exam performed.  Patient educated on care of the diabetic foot.  All mycotic nails debrided.  Nails debrided mechanically with nail nipper.  Calluses debrided without pain or complication.  Aftercare instruction given.  Return for follow-up.        Subjective:  patient, a diabetic white male, complains of pain in his feet with ambulation.  No history of trauma     History of Present Illness  HPI: Patient is a diabetic with pain in his feet with ambulation. No history of trauma. Patient is pre-ulcerative calluses. These hurt with ambulation.      Review of Systems   Constitutional: feeling poorly, but-- as noted in HPI,-- no fever-- and-- no chills.  ENT: nasal discharge, but-- as noted in HPI.  Cardiovascular: no complaints of slow or fast heart rate, no chest pain, no palpitations, no leg claudication or lower extremity edema.  Respiratory: cough, but-- as noted in HPI.  Gastrointestinal: no complaints of abdominal pain, no constipation, no nausea or vomiting, no diarrhea or bloody stools.  Genitourinary: no complaints of dysuria or incontinence, no hesitancy, no nocturia, no genital lesion, no inadequacy of penile erection.  Musculoskeletal: no complaints of arthralgia, no myalgia, no joint swelling or stiffness, no limb pain or swelling.  Integumentary: no complaints of skin rash or lesion, no itching or dry skin, no skin wounds.      Active Problems  1. Abnormal EKG (794.31) (R94.31)   2. Acquired ankle/foot deformity (736.70) (M21.969)   3. Acute knee pain, right   4. Basal cell carcinoma of skin (173.91) (C44.91)   5. Benign essential hypertension (401.1) (I10)   6. BMI 31.0-31.9,adult (V85.31) (Z68.31)   7. Callus (700) (L84)   8. Cough (786.2) (R05)   9. Deformity of ankle and foot, acquired (736.70) (M21.969)   10. Diabetes mellitus with  neuropathy (250.60,357.2) (E11.40)   11. Diabetic neuropathy (250.60,357.2) (E11.40)   12. Dizziness (780.4) (R42)   13. Encounter for prostate cancer screening (V76.44) (Z12.5)   14. Encounter for screening for cardiovascular disorders (V81.2) (Z13.6)   15. Encounter for screening for malignant neoplasm of colon (V76.51) (Z12.11)   16. Encounter for screening for malignant neoplasm of prostate (V76.44) (Z12.5)   17. Hypercholesterolemia (272.0) (E78.00)   18. Hypokalemia (276.8) (E87.6)   19. Limb pain (729.5) (M79.609)   20. Medicare annual wellness visit, initial (V70.0) (Z00.00)   21. 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 Need for vaccination (V05.9) (Z23)   23. Never a smoker   24. Non-healing ulcer (707.9) (L98.499)   25. Obesity, Class I, BMI 30-34.9 (278.00) (E66.9)   26. Onychomycosis (110.1) (B35.1)   27. Open wound of foot, left, subsequent encounter (V58.89,892.0) (S91.302D)   28. Pain in both feet (729.5) (M79.671,M79.672)   29. Primary dysthymia (300.4) (F34.1)   30. Screening for hypothyroidism (V77.0) (Z13.29)   31. Wound of skin (782.9) (R23.8)     Past Medical History   · History of Acute upper respiratory infection (465.9) (J06.9)   · History of Ascending cholangitis (576.1) (K83.0)   · History of Atherosclerosis of arteries of extremities (440.20) (I70.209)   · History of Bug bite with infection (919.5,E906.4) (W57.XXXA)   · History of Callus (700) (L84)   · History of Need for vaccination (V05.9) (Z23)   · Screening for genitourinary condition (V81.6) (Z13.89)   · History of Screening for genitourinary condition (V81.6) (Z13.89)     The active problems and past medical history were reviewed and updated today.      Surgical History      · History of Appendectomy   · History of Cholecystectomy   · History of Open Treatment Of Fracture Of The Radial Shaft   · History of Tonsillectomy     The surgical history was reviewed and updated today.       Family History  Mother    · Family history of Heart  disease   · Family history of HTN (hypertension)   · Family history of Liver cancer   · Family history of Melanoma  Father    · Family history of    · Family history of Gastric ulcer  Brother    · Family history of Diabetes  Grandparent    · Family history of Diabetes     Social History      · Never a smoker  The social history was reviewed and updated today.    The medication list was reviewed and updated today.      Allergies  1. No Known Drug Allergies         Physical Exam  Left Foot: Appearance: Normal except as noted: excessive pronation-- and-- pes planus. Great toe deformities include a bunion. Tenderness: None except the great toe.   Right Foot: Appearance: Normal except as noted: excessive pronation-- and-- pes planus. Great toe deformities include a bunion. Tenderness: None except the great toe.   Left Ankle: ROM: limited ROM in all planes Motor: diffuse weakness.   Right Ankle: ROM: limited ROM in all planes Motor: diffuse weakness.   Neurological Exam: performed. Light touch was decreased bilaterally. Vibratory sensation was decreased in both first metatarsophalangeal joints.   Vascular Exam: performed Dorsalis pedis pulses were One/4 bilaterally. Posterior tibial pulses were One/4 bilaterally. Elevation Pallor: present bilaterally. Capillary refill time was greater than 3 seconds bilaterally-- and-- Q 9, findings bilateral. Negative digital hair. Edema: mild bilaterally-- and-- No Homans sign.   Toenails: All of the toenails were elongated,-- hypertrophied,-- discolored,-- shown to have subungual debris-- and-- Mycotic.    Socks and shoes removed, Right Foot Findings: erythematous and dry. Diminished tactile sensation with monofilament testing throughout the right foot.  Socks and shoes removed, Left Foot Findings: erythematous and dry. Diminished tactile sensation with monofilament testing throughout the left foot. Capillary refills findings on the right were delayed in the toes.  Pulses:  1+  in the posterior tibialis on the right  1+ in the dorsalis pedis on the right.  Capillary refills findings on the left were delayed in the toes.  Pulses:  1+ in the posterior tibialis on the left  1+ in the dorsalis pedis on the left.  Assign Risk Category: 2: Loss of protective sensation with or without weakness, deformity, callus, pre-ulcer, or history of ulceration. High risk.   Hyperkeratosis: present on both first toes,-- present on both first sub metatarsals-- and-- Pinch callus, bilateral.   Shoe Gear Evaluation: performed (). Right Foot: width: E.-- and-- length: 12. Left Foot: width: E.-- and-- length: 12. Recommendation(s): custom inlays.     Patient's shoes and socks removed.Right Foot/Ankle   Right Foot Inspection  Skin Exam: callus and callus               Toe Exam: swelling and erythema  Sensory   Vibration: diminished  Proprioception: diminished      Vascular  Capillary refills: elevated  The right DP pulse is 1+. The right PT pulse is 1+.      Left Foot/Ankle  Left Foot Inspection  Skin Exam: callus              Toe Exam: swelling and erythema                   Sensory   Vibration: diminished  Proprioception: diminished     Vascular  Capillary refills: elevated  The left DP pulse is 1+. The left PT pulse is 1+.      Patient's shoes and socks removed.     Right Foot/Ankle   Right Foot Inspection        Toe Exam: right toe deformity.      Sensory   Vibration: diminished        Vascular  Capillary refills: < 3 seconds        Left Foot/Ankle  Left Foot Inspection        Toe Exam: left toe deformity.      Sensory   Vibration: diminished        Vascular  Capillary refills: < 3 seconds        Assign Risk Category  Deformity present  Loss of protective sensation  Weak pulses  Risk: 2

## 2024-03-04 DIAGNOSIS — E11.43 TYPE 2 DIABETES MELLITUS WITH DIABETIC AUTONOMIC NEUROPATHY, WITHOUT LONG-TERM CURRENT USE OF INSULIN (HCC): ICD-10-CM

## 2024-03-06 DIAGNOSIS — E11.43 TYPE 2 DIABETES MELLITUS WITH DIABETIC AUTONOMIC NEUROPATHY, WITHOUT LONG-TERM CURRENT USE OF INSULIN (HCC): ICD-10-CM

## 2024-03-11 DIAGNOSIS — E11.42 DIABETIC POLYNEUROPATHY ASSOCIATED WITH TYPE 2 DIABETES MELLITUS (HCC): ICD-10-CM

## 2024-03-11 DIAGNOSIS — E78.2 MIXED HYPERLIPIDEMIA: ICD-10-CM

## 2024-03-11 RX ORDER — SIMVASTATIN 10 MG
TABLET ORAL
Qty: 90 TABLET | Refills: 3 | Status: SHIPPED | OUTPATIENT
Start: 2024-03-11

## 2024-03-30 DIAGNOSIS — E11.43 TYPE 2 DIABETES MELLITUS WITH DIABETIC AUTONOMIC NEUROPATHY, WITHOUT LONG-TERM CURRENT USE OF INSULIN (HCC): ICD-10-CM

## 2024-04-01 RX ORDER — TIRZEPATIDE 5 MG/.5ML
INJECTION, SOLUTION SUBCUTANEOUS
Qty: 2 ML | Refills: 11 | Status: SHIPPED | OUTPATIENT
Start: 2024-04-01

## 2024-04-18 LAB — HBA1C MFR BLD: 8 % (ref 4.8–5.6)

## 2024-05-01 DIAGNOSIS — I10 HYPERTENSION, UNSPECIFIED TYPE: ICD-10-CM

## 2024-05-01 DIAGNOSIS — E11.42 DIABETIC POLYNEUROPATHY ASSOCIATED WITH TYPE 2 DIABETES MELLITUS (HCC): ICD-10-CM

## 2024-05-01 DIAGNOSIS — F32.A MINOR DEPRESSION: ICD-10-CM

## 2024-05-03 RX ORDER — VILAZODONE HYDROCHLORIDE 20 MG/1
TABLET ORAL
Qty: 90 TABLET | Refills: 1 | Status: SHIPPED | OUTPATIENT
Start: 2024-05-03

## 2024-05-03 RX ORDER — GLIPIZIDE 10 MG/1
TABLET, FILM COATED, EXTENDED RELEASE ORAL
Qty: 90 TABLET | Refills: 1 | Status: SHIPPED | OUTPATIENT
Start: 2024-05-03

## 2024-05-03 RX ORDER — AMLODIPINE BESYLATE AND BENAZEPRIL HYDROCHLORIDE 5; 10 MG/1; MG/1
1 CAPSULE ORAL DAILY
Qty: 90 CAPSULE | Refills: 1 | Status: SHIPPED | OUTPATIENT
Start: 2024-05-03

## 2024-05-03 RX ORDER — REPAGLINIDE 1 MG/1
1 TABLET ORAL
Qty: 180 TABLET | Refills: 1 | Status: SHIPPED | OUTPATIENT
Start: 2024-05-03

## 2024-05-13 ENCOUNTER — OFFICE VISIT (OUTPATIENT)
Age: 89
End: 2024-05-13
Payer: COMMERCIAL

## 2024-05-13 VITALS
BODY MASS INDEX: 31.22 KG/M2 | SYSTOLIC BLOOD PRESSURE: 168 MMHG | DIASTOLIC BLOOD PRESSURE: 88 MMHG | HEART RATE: 70 BPM | WEIGHT: 223 LBS | HEIGHT: 71 IN | RESPIRATION RATE: 17 BRPM

## 2024-05-13 DIAGNOSIS — L84 CORNS: ICD-10-CM

## 2024-05-13 DIAGNOSIS — M79.671 PAIN IN BOTH FEET: ICD-10-CM

## 2024-05-13 DIAGNOSIS — I70.209 PERIPHERAL ARTERIOSCLEROSIS (HCC): ICD-10-CM

## 2024-05-13 DIAGNOSIS — E11.42 DIABETIC POLYNEUROPATHY ASSOCIATED WITH TYPE 2 DIABETES MELLITUS (HCC): Primary | ICD-10-CM

## 2024-05-13 DIAGNOSIS — M79.672 PAIN IN BOTH FEET: ICD-10-CM

## 2024-05-13 PROCEDURE — 11056 PARNG/CUTG B9 HYPRKR LES 2-4: CPT | Performed by: PODIATRIST

## 2024-05-18 DIAGNOSIS — E11.42 DIABETIC POLYNEUROPATHY ASSOCIATED WITH TYPE 2 DIABETES MELLITUS (HCC): ICD-10-CM

## 2024-05-19 RX ORDER — METFORMIN HYDROCHLORIDE 500 MG/1
TABLET, EXTENDED RELEASE ORAL
Qty: 90 TABLET | Refills: 1 | Status: SHIPPED | OUTPATIENT
Start: 2024-05-19

## 2024-05-19 RX ORDER — SITAGLIPTIN 50 MG/1
TABLET, FILM COATED ORAL
Qty: 90 TABLET | Refills: 1 | Status: SHIPPED | OUTPATIENT
Start: 2024-05-19

## 2024-06-27 ENCOUNTER — TELEPHONE (OUTPATIENT)
Dept: FAMILY MEDICINE CLINIC | Facility: CLINIC | Age: 89
End: 2024-06-27

## 2024-06-27 LAB
ALBUMIN SERPL-MCNC: 4.4 G/DL (ref 3.7–4.7)
ALBUMIN/CREAT UR: 28 MG/G CREAT (ref 0–29)
ALP SERPL-CCNC: 58 IU/L (ref 44–121)
ALT SERPL-CCNC: 19 IU/L (ref 0–44)
AST SERPL-CCNC: 21 IU/L (ref 0–40)
BILIRUB SERPL-MCNC: 0.5 MG/DL (ref 0–1.2)
BUN SERPL-MCNC: 14 MG/DL (ref 8–27)
BUN/CREAT SERPL: 11 (ref 10–24)
CALCIUM SERPL-MCNC: 9.4 MG/DL (ref 8.6–10.2)
CHLORIDE SERPL-SCNC: 105 MMOL/L (ref 96–106)
CO2 SERPL-SCNC: 23 MMOL/L (ref 20–29)
CREAT SERPL-MCNC: 1.27 MG/DL (ref 0.76–1.27)
CREAT UR-MCNC: 116.5 MG/DL
EGFR: 54 ML/MIN/1.73
GLOBULIN SER-MCNC: 2.3 G/DL (ref 1.5–4.5)
GLUCOSE SERPL-MCNC: 133 MG/DL (ref 70–99)
HBA1C MFR BLD: 6.7 % (ref 4.8–5.6)
MICROALBUMIN UR-MCNC: 32.7 UG/ML
MICRODELETION SYND BLD/T FISH: NORMAL
POTASSIUM SERPL-SCNC: 4.1 MMOL/L (ref 3.5–5.2)
PROT SERPL-MCNC: 6.7 G/DL (ref 6–8.5)
SODIUM SERPL-SCNC: 143 MMOL/L (ref 134–144)

## 2024-06-27 NOTE — TELEPHONE ENCOUNTER
Spoke with pt discussed results of his labs A1c is 6.7 on the injection.  I have stopped his prandin and metformin.  He is already aware of results and the med modification  Please sched pt for an AWV

## 2024-07-03 NOTE — TELEPHONE ENCOUNTER
Left message for patient to call back  If he calls back please schedule AWV    Juani Sotomayor  CMA

## 2024-07-23 ENCOUNTER — OFFICE VISIT (OUTPATIENT)
Age: 89
End: 2024-07-23
Payer: COMMERCIAL

## 2024-07-23 VITALS
SYSTOLIC BLOOD PRESSURE: 143 MMHG | HEART RATE: 76 BPM | WEIGHT: 223 LBS | RESPIRATION RATE: 16 BRPM | HEIGHT: 71 IN | DIASTOLIC BLOOD PRESSURE: 76 MMHG | BODY MASS INDEX: 31.22 KG/M2

## 2024-07-23 DIAGNOSIS — E11.42 DIABETIC POLYNEUROPATHY ASSOCIATED WITH TYPE 2 DIABETES MELLITUS (HCC): Primary | ICD-10-CM

## 2024-07-23 DIAGNOSIS — L84 CORNS: ICD-10-CM

## 2024-07-23 DIAGNOSIS — I70.209 PERIPHERAL ARTERIOSCLEROSIS (HCC): ICD-10-CM

## 2024-07-23 DIAGNOSIS — M79.671 PAIN IN BOTH FEET: ICD-10-CM

## 2024-07-23 DIAGNOSIS — M79.672 PAIN IN BOTH FEET: ICD-10-CM

## 2024-07-23 PROCEDURE — 11056 PARNG/CUTG B9 HYPRKR LES 2-4: CPT | Performed by: PODIATRIST

## 2024-07-23 PROCEDURE — RECHECK: Performed by: PODIATRIST

## 2024-08-09 DIAGNOSIS — I10 BENIGN ESSENTIAL HYPERTENSION: ICD-10-CM

## 2024-08-10 RX ORDER — CARVEDILOL PHOSPHATE 20 MG/1
CAPSULE, EXTENDED RELEASE ORAL
Qty: 90 CAPSULE | Refills: 1 | Status: SHIPPED | OUTPATIENT
Start: 2024-08-10

## 2024-09-26 ENCOUNTER — OFFICE VISIT (OUTPATIENT)
Age: 89
End: 2024-09-26
Payer: COMMERCIAL

## 2024-09-26 VITALS
BODY MASS INDEX: 31.22 KG/M2 | SYSTOLIC BLOOD PRESSURE: 127 MMHG | RESPIRATION RATE: 17 BRPM | WEIGHT: 223 LBS | DIASTOLIC BLOOD PRESSURE: 75 MMHG | HEIGHT: 71 IN | HEART RATE: 80 BPM

## 2024-09-26 DIAGNOSIS — I70.209 PERIPHERAL ARTERIOSCLEROSIS (HCC): ICD-10-CM

## 2024-09-26 DIAGNOSIS — L84 CORNS: ICD-10-CM

## 2024-09-26 DIAGNOSIS — M79.672 PAIN IN BOTH FEET: ICD-10-CM

## 2024-09-26 DIAGNOSIS — M79.671 PAIN IN BOTH FEET: ICD-10-CM

## 2024-09-26 DIAGNOSIS — E11.42 DIABETIC POLYNEUROPATHY ASSOCIATED WITH TYPE 2 DIABETES MELLITUS (HCC): Primary | ICD-10-CM

## 2024-09-26 PROCEDURE — 11056 PARNG/CUTG B9 HYPRKR LES 2-4: CPT | Performed by: PODIATRIST

## 2024-09-26 PROCEDURE — RECHECK: Performed by: PODIATRIST

## 2024-10-02 DIAGNOSIS — E11.42 DIABETIC POLYNEUROPATHY ASSOCIATED WITH TYPE 2 DIABETES MELLITUS (HCC): ICD-10-CM

## 2024-10-02 DIAGNOSIS — I10 HYPERTENSION, UNSPECIFIED TYPE: ICD-10-CM

## 2024-10-02 RX ORDER — GLIPIZIDE 10 MG/1
TABLET, FILM COATED, EXTENDED RELEASE ORAL
Qty: 90 TABLET | Refills: 3 | Status: SHIPPED | OUTPATIENT
Start: 2024-10-02

## 2024-10-02 RX ORDER — AMLODIPINE AND BENAZEPRIL HYDROCHLORIDE 5; 10 MG/1; MG/1
1 CAPSULE ORAL DAILY
Qty: 90 CAPSULE | Refills: 3 | Status: SHIPPED | OUTPATIENT
Start: 2024-10-02

## 2024-10-16 DIAGNOSIS — E11.42 DIABETIC POLYNEUROPATHY ASSOCIATED WITH TYPE 2 DIABETES MELLITUS (HCC): ICD-10-CM

## 2024-10-17 RX ORDER — SITAGLIPTIN 50 MG/1
TABLET, FILM COATED ORAL
Qty: 90 TABLET | Refills: 1 | Status: SHIPPED | OUTPATIENT
Start: 2024-10-17

## 2024-12-05 ENCOUNTER — OFFICE VISIT (OUTPATIENT)
Age: 89
End: 2024-12-05
Payer: COMMERCIAL

## 2024-12-05 VITALS
HEART RATE: 75 BPM | HEIGHT: 71 IN | RESPIRATION RATE: 17 BRPM | WEIGHT: 223 LBS | SYSTOLIC BLOOD PRESSURE: 154 MMHG | DIASTOLIC BLOOD PRESSURE: 74 MMHG | BODY MASS INDEX: 31.22 KG/M2

## 2024-12-05 DIAGNOSIS — E11.42 DIABETIC POLYNEUROPATHY ASSOCIATED WITH TYPE 2 DIABETES MELLITUS (HCC): Primary | ICD-10-CM

## 2024-12-05 DIAGNOSIS — L84 CORNS: ICD-10-CM

## 2024-12-05 DIAGNOSIS — M79.672 PAIN IN BOTH FEET: ICD-10-CM

## 2024-12-05 DIAGNOSIS — I70.209 PERIPHERAL ARTERIOSCLEROSIS (HCC): ICD-10-CM

## 2024-12-05 DIAGNOSIS — M79.671 PAIN IN BOTH FEET: ICD-10-CM

## 2024-12-05 PROCEDURE — 11056 PARNG/CUTG B9 HYPRKR LES 2-4: CPT | Performed by: PODIATRIST

## 2024-12-05 PROCEDURE — RECHECK: Performed by: PODIATRIST

## 2024-12-24 DIAGNOSIS — F32.A MINOR DEPRESSION: ICD-10-CM

## 2024-12-24 RX ORDER — VILAZODONE HYDROCHLORIDE 20 MG/1
20 TABLET ORAL DAILY
Qty: 90 TABLET | Refills: 3 | Status: SHIPPED | OUTPATIENT
Start: 2024-12-24

## 2024-12-31 NOTE — PRE-PROCEDURE INSTRUCTIONS
Pre-Surgery Instructions:   Medication Instructions    amLODIPine-benazepril (LOTREL 5-10) 5-10 MG per capsule Hold day of surgery.    Bioflavonoid Products (ZULEYKA-C) 500-200-60 MG TABS Hold day of surgery.    carvedilol (COREG CR) 20 MG 24 hr capsule Take day of surgery.    Cinnamon 500 MG TABS Hold day of surgery.    clotrimazole-betamethasone (LOTRISONE) 1-0.05 % cream Hold day of surgery.    fluticasone (FLONASE) 50 mcg/act nasal spray Uses PRN- OK to take day of surgery    glipiZIDE (GLUCOTROL XL) 10 mg 24 hr tablet Hold day of surgery.    hydrocortisone 1 % cream Hold day of surgery.    Mounjaro 5 MG/0.5ML Stop taking 7 days prior to surgery.last dose 12/29    Multiple Vitamins-Iron (DAILY MULTIPLE VITAMIN/IRON PO) Hold day of surgery.    Omega-3 Fatty Acids (FISH OIL) 1000 MG CPDR Hold day of surgery.    simvastatin (ZOCOR) 10 mg tablet Take night before surgery    sitaGLIPtin (Januvia) 50 mg tablet Hold day of surgery.    vilazodone (VIIBRYD) 20 mg tablet Take day of surgery.   Confirmed eye drops and instructions from sx. Office .     Medication instructions for day surgery reviewed. Please use only a sip of water to take your instructed medications. Avoid all over the counter vitamins, supplements and NSAIDS for one week prior to surgery per anesthesia guidelines. Tylenol is ok to take as needed.     You will receive a call one business day prior to surgery with an arrival time and hospital directions. If your surgery is scheduled on a Monday, the hospital will be calling you on the Friday prior to your surgery. If you have not heard from anyone by 8pm, please call the hospital supervisor through the hospital  at [phone]. ([place] [phone] or [place] [phone]).    Do not eat or drink anything after midnight the night before your surgery, including candy, mints, lifesavers, or chewing gum. Do not drink alcohol 24hrs before your surgery. Try not to smoke at least 24hrs before your  surgery.       Follow the pre surgery showering instructions as listed in the “My Surgical Experience Booklet” or otherwise provided by your surgeon's office. Do not use a blade to shave the surgical area 1 week before surgery. It is okay to use a clean electric clippers up to 24 hours before surgery. Do not apply any lotions, creams, including makeup, cologne, deodorant, or perfumes after showering on the day of your surgery. Do not use dry shampoo, hair spray, hair gel, or any type of hair products.     No contact lenses, eye make-up, or artificial eyelashes. Remove nail polish, including gel polish, and any artificial, gel, or acrylic nails if possible. Remove all jewelry including rings and body piercing jewelry.     Wear causal clothing that is easy to take on and off. Consider your type of surgery.    Keep any valuables, jewelry, piercings at home. Please bring any specially ordered equipment (sling, braces) if indicated.    Arrange for a responsible person to drive you to and from the hospital on the day of your surgery. Please confirm the visitor policy for the day of your procedure when you receive your phone call with an arrival time.     Call the surgeon's office with any new illnesses, exposures, or additional questions prior to surgery.    Please reference your “My Surgical Experience Booklet” for additional information to prepare for your upcoming surgery.Medication instructions for day surgery reviewed. Please use only a sip of water to take your instructed medications. Avoid all over the counter vitamins, supplements and NSAIDS for day of surgery per anesthesia guidelines. Tylenol is ok to take as needed.     You will receive a call one business day prior to surgery with an arrival time and hospital directions. If your surgery is scheduled on a Monday, the hospital will be calling you on the Friday prior to your surgery. If you have not heard from anyone by 8pm, please call the hospital supervisor  through the hospital  at [phone]. ([place] [phone] or [place] [phone]).    Do not eat or drink anything after midnight the night before your surgery, including candy, mints, lifesavers, or chewing gum. Do not drink alcohol 24hrs before your surgery. Try not to smoke at least 24hrs before your surgery.       Follow the pre surgery showering instructions as listed in the “My Surgical Experience Booklet” or otherwise provided by your surgeon's office. Do not use a blade to shave the surgical area 1 week before surgery. It is okay to use a clean electric clippers up to 24 hours before surgery. Do not apply any lotions, creams, including makeup, cologne, deodorant, or perfumes after showering on the day of your surgery. Do not use dry shampoo, hair spray, hair gel, or any type of hair products. Follow instructions per sx. For baby shampoo.    No contact lenses, eye make-up, or artificial eyelashes. Remove nail polish, including gel polish, and any artificial, gel, or acrylic nails if possible. Remove all jewelry including rings and body piercing jewelry.     Wear causal clothing that is easy to take on and off. Consider your type of surgery. Encouraged zip up or button down style top for day of surgery .     Keep any valuables, jewelry, piercings at home. Please bring any specially ordered equipment (sling, braces) if indicated.    Arrange for a responsible person to drive you to and from the hospital on the day of your surgery. Please confirm the visitor policy for the day of your procedure when you receive your phone call with an arrival time.     Call the surgeon's office with any new illnesses, exposures, or additional questions prior to surgery.    Please reference your “My Surgical Experience Booklet” for additional information to prepare for your upcoming surgery.    Pt. Verbalized an understanding of all instructions reviewed and offers no concerns at this time.

## 2025-01-06 ENCOUNTER — ANESTHESIA (OUTPATIENT)
Dept: PERIOP | Facility: AMBULARY SURGERY CENTER | Age: 89
End: 2025-01-06
Payer: COMMERCIAL

## 2025-01-06 ENCOUNTER — ANESTHESIA EVENT (OUTPATIENT)
Dept: PERIOP | Facility: AMBULARY SURGERY CENTER | Age: 89
End: 2025-01-06
Payer: COMMERCIAL

## 2025-01-06 ENCOUNTER — HOSPITAL ENCOUNTER (OUTPATIENT)
Facility: AMBULARY SURGERY CENTER | Age: 89
Setting detail: OUTPATIENT SURGERY
Discharge: HOME | End: 2025-01-06
Attending: OPHTHALMOLOGY | Admitting: OPHTHALMOLOGY
Payer: COMMERCIAL

## 2025-01-06 VITALS
OXYGEN SATURATION: 95 % | BODY MASS INDEX: 28 KG/M2 | HEIGHT: 71 IN | DIASTOLIC BLOOD PRESSURE: 77 MMHG | RESPIRATION RATE: 17 BRPM | TEMPERATURE: 98 F | HEART RATE: 74 BPM | SYSTOLIC BLOOD PRESSURE: 143 MMHG | WEIGHT: 200 LBS

## 2025-01-06 DIAGNOSIS — H25.12 AGE-RELATED NUCLEAR CATARACT OF LEFT EYE: Primary | ICD-10-CM

## 2025-01-06 LAB — GLUCOSE SERPL-MCNC: 160 MG/DL (ref 65–140)

## 2025-01-06 PROCEDURE — 82948 REAGENT STRIP/BLOOD GLUCOSE: CPT

## 2025-01-06 PROCEDURE — V2632 POST CHMBR INTRAOCULAR LENS: HCPCS | Performed by: OPHTHALMOLOGY

## 2025-01-06 DEVICE — STERILE UV AND BLUE LIGHT FILTERING ACRYLIC FOLDABLE ASPHERIC POSTERIOR CHAMBER INTRAOCULAR LENS
Type: IMPLANTABLE DEVICE | Site: EYE | Status: FUNCTIONAL
Brand: CLAREON

## 2025-01-06 RX ORDER — TETRACAINE HYDROCHLORIDE 5 MG/ML
1 SOLUTION OPHTHALMIC ONCE
Status: COMPLETED | OUTPATIENT
Start: 2025-01-06 | End: 2025-01-06

## 2025-01-06 RX ORDER — GATIFLOXACIN 5 MG/ML
1 SOLUTION/ DROPS OPHTHALMIC 2 TIMES DAILY
Start: 2025-01-06

## 2025-01-06 RX ORDER — POVIDONE-IODINE 5 %
SOLUTION, NON-ORAL OPHTHALMIC (EYE) AS NEEDED
Status: DISCONTINUED | OUTPATIENT
Start: 2025-01-06 | End: 2025-01-06 | Stop reason: HOSPADM

## 2025-01-06 RX ORDER — GATIFLOXACIN 5 MG/ML
SOLUTION/ DROPS OPHTHALMIC AS NEEDED
Status: DISCONTINUED | OUTPATIENT
Start: 2025-01-06 | End: 2025-01-06 | Stop reason: HOSPADM

## 2025-01-06 RX ORDER — CYCLOPENTOLATE HYDROCHLORIDE 10 MG/ML
1 SOLUTION/ DROPS OPHTHALMIC
Status: ACTIVE | OUTPATIENT
Start: 2025-01-06 | End: 2025-01-06

## 2025-01-06 RX ORDER — BALANCED SALT SOLUTION 6.4; .75; .48; .3; 3.9; 1.7 MG/ML; MG/ML; MG/ML; MG/ML; MG/ML; MG/ML
SOLUTION OPHTHALMIC AS NEEDED
Status: DISCONTINUED | OUTPATIENT
Start: 2025-01-06 | End: 2025-01-06 | Stop reason: HOSPADM

## 2025-01-06 RX ORDER — MIDAZOLAM HYDROCHLORIDE 2 MG/2ML
INJECTION, SOLUTION INTRAMUSCULAR; INTRAVENOUS AS NEEDED
Status: DISCONTINUED | OUTPATIENT
Start: 2025-01-06 | End: 2025-01-06

## 2025-01-06 RX ORDER — SODIUM CHLORIDE, SODIUM LACTATE, POTASSIUM CHLORIDE, CALCIUM CHLORIDE 600; 310; 30; 20 MG/100ML; MG/100ML; MG/100ML; MG/100ML
125 INJECTION, SOLUTION INTRAVENOUS CONTINUOUS
Status: DISCONTINUED | OUTPATIENT
Start: 2025-01-06 | End: 2025-01-06 | Stop reason: HOSPADM

## 2025-01-06 RX ORDER — PREDNISOLONE ACETATE 10 MG/ML
1 SUSPENSION/ DROPS OPHTHALMIC 4 TIMES DAILY
Start: 2025-01-06

## 2025-01-06 RX ORDER — KETOROLAC TROMETHAMINE 5 MG/ML
1 SOLUTION OPHTHALMIC 4 TIMES DAILY
Start: 2025-01-06

## 2025-01-06 RX ORDER — LIDOCAINE HYDROCHLORIDE 10 MG/ML
INJECTION, SOLUTION EPIDURAL; INFILTRATION; INTRACAUDAL; PERINEURAL AS NEEDED
Status: DISCONTINUED | OUTPATIENT
Start: 2025-01-06 | End: 2025-01-06 | Stop reason: HOSPADM

## 2025-01-06 RX ORDER — KETOROLAC TROMETHAMINE 5 MG/ML
1 SOLUTION OPHTHALMIC
Status: ACTIVE | OUTPATIENT
Start: 2025-01-06 | End: 2025-01-06

## 2025-01-06 RX ORDER — TETRACAINE HYDROCHLORIDE 5 MG/ML
SOLUTION OPHTHALMIC AS NEEDED
Status: DISCONTINUED | OUTPATIENT
Start: 2025-01-06 | End: 2025-01-06 | Stop reason: HOSPADM

## 2025-01-06 RX ORDER — PHENYLEPHRINE HYDROCHLORIDE 25 MG/ML
1 SOLUTION/ DROPS OPHTHALMIC
Status: ACTIVE | OUTPATIENT
Start: 2025-01-06 | End: 2025-01-06

## 2025-01-06 RX ORDER — LIDOCAINE HYDROCHLORIDE 20 MG/ML
1 JELLY TOPICAL
Status: COMPLETED | OUTPATIENT
Start: 2025-01-06 | End: 2025-01-06

## 2025-01-06 RX ADMIN — TETRACAINE HYDROCHLORIDE 1 DROP: 5 SOLUTION OPHTHALMIC at 06:20

## 2025-01-06 RX ADMIN — LIDOCAINE HYDROCHLORIDE 1 APPLICATION: 20 JELLY TOPICAL at 06:35

## 2025-01-06 RX ADMIN — MIDAZOLAM 1 MG: 1 INJECTION INTRAMUSCULAR; INTRAVENOUS at 07:03

## 2025-01-06 RX ADMIN — PHENYLEPHRINE HYDROCHLORIDE 1 DROP: 25 SOLUTION/ DROPS OPHTHALMIC at 06:35

## 2025-01-06 RX ADMIN — LIDOCAINE HYDROCHLORIDE 1 APPLICATION: 20 JELLY TOPICAL at 06:50

## 2025-01-06 RX ADMIN — CYCLOPENTOLATE HYDROCHLORIDE 1 DROP: 10 SOLUTION/ DROPS OPHTHALMIC at 06:35

## 2025-01-06 RX ADMIN — PHENYLEPHRINE HYDROCHLORIDE 1 DROP: 25 SOLUTION/ DROPS OPHTHALMIC at 06:20

## 2025-01-06 RX ADMIN — KETOROLAC TROMETHAMINE 1 DROP: 5 SOLUTION OPHTHALMIC at 06:20

## 2025-01-06 RX ADMIN — PHENYLEPHRINE HYDROCHLORIDE 1 DROP: 25 SOLUTION/ DROPS OPHTHALMIC at 06:50

## 2025-01-06 RX ADMIN — LIDOCAINE HYDROCHLORIDE 1 APPLICATION: 20 JELLY TOPICAL at 06:20

## 2025-01-06 RX ADMIN — MIDAZOLAM 1 MG: 1 INJECTION INTRAMUSCULAR; INTRAVENOUS at 06:59

## 2025-01-06 RX ADMIN — CYCLOPENTOLATE HYDROCHLORIDE 1 DROP: 10 SOLUTION/ DROPS OPHTHALMIC at 06:50

## 2025-01-06 RX ADMIN — CYCLOPENTOLATE HYDROCHLORIDE 1 DROP: 10 SOLUTION/ DROPS OPHTHALMIC at 06:20

## 2025-01-06 RX ADMIN — KETOROLAC TROMETHAMINE 1 DROP: 5 SOLUTION OPHTHALMIC at 06:50

## 2025-01-06 RX ADMIN — KETOROLAC TROMETHAMINE 1 DROP: 5 SOLUTION OPHTHALMIC at 06:35

## 2025-01-06 NOTE — ANESTHESIA POSTPROCEDURE EVALUATION
Post-Op Assessment Note    CV Status:  Stable  Pain Score: 0    Pain management: adequate       Mental Status:  Alert   Hydration Status:  Stable   PONV Controlled:  None   Airway Patency:  Patent  Two or more mitigation strategies used for obstructive sleep apnea   Post Op Vitals Reviewed: Yes    No anethesia notable event occurred.    Staff: CRNA           Last Filed PACU Vitals:  Vitals Value Taken Time   Temp     Pulse 68    /67    Resp 16    SpO2 99

## 2025-01-06 NOTE — ANESTHESIA POSTPROCEDURE EVALUATION
Post-Op Assessment Note    Last Filed PACU Vitals:  Vitals Value Taken Time   Temp     Pulse 74 01/06/25 0725   /77 01/06/25 0725   Resp 17 01/06/25 0725   SpO2 95 % 01/06/25 0725       Modified Freddy:     Vitals Value Taken Time   Activity 2 01/06/25 0733   Respiration 2 01/06/25 0733   Circulation 2 01/06/25 0733   Consciousness 2 01/06/25 0733   Oxygen Saturation 2 01/06/25 0733     Modified Freddy Score: 10

## 2025-01-06 NOTE — DISCHARGE INSTR - AVS FIRST PAGE
Dr. Soria Cataract Instructions    Activity:     1.  No Driving until instructed   2.  Keep shield on until seen tomorrow except when administering drops   3.  No heavy lifting   4.  No water in eye     Diet:     1.  Resume normal diet    Normal Symptoms:     1.  Mild Headache   2. Scratchy or picky feeling around eye    Call the office if:     1.  You have any questions or concerns   2.  If eye pain is not relieved by extra strength tylenol    Office phone number:  [phone]      Next appointment:     1.  See Dr Soria at his office tomorrow as scheduled   __________________________________________________________   2.  Bring blue eye kit with you and eyedrops to the office    A new set of comprehensive instructions will be given and reviewed with you during your office visit tomorrow.

## 2025-01-06 NOTE — OP NOTE
OPERATIVE REPORT    PATIENT NAME: Danny Beckham    :  1935  MRN: [id number]  Pt Location: [facility] OR ROOM 01    Surgery Date: 2025    Surgeons and Role:     * Romain Soria MD - Primary    Age-related nuclear cataract, left eye [H25.12]    Post-Op Diagnosis Codes:     * Age-related nuclear cataract, left eye [H25.12]    Procedure(s):  EXTRACTION EXTRACAPSULAR CATARACT PHACO INTRAOCULAR LENS (IOL)    Anesthesia Type:   IV Sedation with Anesthesia    Operative Indications:  Age-related nuclear cataract, left eye [H25.12]  Decreased vision to 20/40. With problems driving.  Pt requested cataract sx the left eye    Procedure and Technique:    Procedure Details     The patient was brought in the OR in stable condition and placed on the operative table. The left eye was prepped and draped in the usual sterile manner. Attention was directed to the left eye where a lid speculum was placed. A 2.4 mm clear corneal incision was made temperally. 1/2 cc of 1% MPF Lidocaine was irrigated into the anterior chamber followed by viscoat. The side port incision was placed superiorly. The capsularrhexis was made and the nucleus was hydrodissected with BSS. The nucleus was then removed with the phaco handpiece followed by removal of the cortical material with the I/A handpiece. The capsular bag was then filled with Provisc. The IOL was folded and placed in to the capsular bag and centered well. The remaining Provisc was removed from the eye with the I/A. The wounds were hydrated with BSS and found to be water tight. The lid speculum was removed and 2 drops of Gatifloxicin were placed over the cornea. A protective eye shield was taped over the eye and the patient went to PACU in stable condition. I will see the patient in the office tomorrow and the expected post op period is a few weeks.       Complications: None        Disposition: PACU   Condition: Stable    SIGNATURE: Romain Soria MD  DATE: 2025  TIME: 7:30  AM

## 2025-01-06 NOTE — ANESTHESIA PREPROCEDURE EVALUATION
Procedure:  EXTRACTION EXTRACAPSULAR CATARACT PHACO INTRAOCULAR LENS (IOL) (Left: Eye)    Relevant Problems   CARDIO   (+) Hypertension   (+) Mixed hyperlipidemia      ENDO   (+) Type 2 diabetes mellitus with diabetic autonomic neuropathy, without long-term current use of insulin (HCC)      /RENAL   (+) Benign hypertension with CKD (chronic kidney disease) stage III (HCC)   (+) Stage 3a chronic kidney disease (HCC)      NEURO/PSYCH   (+) Diabetic polyneuropathy associated with type 2 diabetes mellitus (HCC)   (+) Minor depression   (+) Type 2 diabetes mellitus with diabetic autonomic neuropathy, without long-term current use of insulin (HCC)        Physical Exam    Airway    Mallampati score: II  TM Distance: >3 FB  Neck ROM: full     Dental   Comment: Denies loose teeth  Upper partial removed     Cardiovascular  Cardiovascular exam normal    Pulmonary  Pulmonary exam normal     Other Findings  Portions of exam deferred due to low yield and/or unknown COVID status      Anesthesia Plan  ASA Score- 3     Anesthesia Type- IV sedation with anesthesia with ASA Monitors.         Additional Monitors:     Airway Plan:            Plan Factors-Exercise tolerance (METS): >4 METS.    Chart reviewed.   Existing labs reviewed. Patient summary reviewed.    Patient is not a current smoker.              Induction- intravenous.    Postoperative Plan-         Informed Consent- Anesthetic plan and risks discussed with patient.  I personally reviewed this patient with the CRNA. Discussed and agreed on the Anesthesia Plan with the CRNA..

## 2025-01-16 DIAGNOSIS — E11.43 TYPE 2 DIABETES MELLITUS WITH DIABETIC AUTONOMIC NEUROPATHY, WITHOUT LONG-TERM CURRENT USE OF INSULIN (HCC): ICD-10-CM

## 2025-01-17 RX ORDER — TIRZEPATIDE 5 MG/.5ML
INJECTION, SOLUTION SUBCUTANEOUS
Qty: 6 ML | Refills: 0 | Status: SHIPPED | OUTPATIENT
Start: 2025-01-17

## 2025-01-29 DIAGNOSIS — I10 BENIGN ESSENTIAL HYPERTENSION: ICD-10-CM

## 2025-01-29 DIAGNOSIS — E11.42 DIABETIC POLYNEUROPATHY ASSOCIATED WITH TYPE 2 DIABETES MELLITUS (HCC): ICD-10-CM

## 2025-01-29 DIAGNOSIS — E78.2 MIXED HYPERLIPIDEMIA: ICD-10-CM

## 2025-01-29 RX ORDER — CARVEDILOL PHOSPHATE 20 MG/1
20 CAPSULE, EXTENDED RELEASE ORAL DAILY
Qty: 90 CAPSULE | Refills: 0 | Status: SHIPPED | OUTPATIENT
Start: 2025-01-29

## 2025-01-29 RX ORDER — SIMVASTATIN 10 MG
10 TABLET ORAL
Qty: 90 TABLET | Refills: 0 | Status: SHIPPED | OUTPATIENT
Start: 2025-01-29

## 2025-02-13 ENCOUNTER — OFFICE VISIT (OUTPATIENT)
Age: 89
End: 2025-02-13
Payer: COMMERCIAL

## 2025-02-13 VITALS — WEIGHT: 200 LBS | RESPIRATION RATE: 17 BRPM | HEIGHT: 71 IN | BODY MASS INDEX: 28 KG/M2

## 2025-02-13 DIAGNOSIS — E11.42 DIABETIC POLYNEUROPATHY ASSOCIATED WITH TYPE 2 DIABETES MELLITUS (HCC): Primary | ICD-10-CM

## 2025-02-13 DIAGNOSIS — L84 CORNS: ICD-10-CM

## 2025-02-13 DIAGNOSIS — I70.209 PERIPHERAL ARTERIOSCLEROSIS (HCC): ICD-10-CM

## 2025-02-13 DIAGNOSIS — M79.672 PAIN IN BOTH FEET: ICD-10-CM

## 2025-02-13 DIAGNOSIS — M79.671 PAIN IN BOTH FEET: ICD-10-CM

## 2025-02-13 PROCEDURE — RECHECK: Performed by: PODIATRIST

## 2025-02-13 PROCEDURE — 11056 PARNG/CUTG B9 HYPRKR LES 2-4: CPT | Performed by: PODIATRIST

## 2025-02-13 NOTE — PROGRESS NOTES
Assessment.  Diabetic neuropathy.  Pain upon ambulation.  Peripheral artery disease.  Mycosis of nail.  Callus formation.  Foot deformity.     Plan.  Chart reviewed.  Foot exam performed.  Patient educated on care of the diabetic foot.  All mycotic nails debrided.  Nails debrided mechanically with nail nipper.  Calluses debrided without pain or complication.  Aftercare instruction given.  Return for follow-up.        Subjective:  patient, a diabetic white male, complains of pain in his feet with ambulation.  No history of trauma     History of Present Illness  HPI: Patient is a diabetic with pain in his feet with ambulation. No history of trauma. Patient is pre-ulcerative calluses. These hurt with ambulation.      Review of Systems   Constitutional: feeling poorly, but-- as noted in HPI,-- no fever-- and-- no chills.  ENT: nasal discharge, but-- as noted in HPI.  Cardiovascular: no complaints of slow or fast heart rate, no chest pain, no palpitations, no leg claudication or lower extremity edema.  Respiratory: cough, but-- as noted in HPI.  Gastrointestinal: no complaints of abdominal pain, no constipation, no nausea or vomiting, no diarrhea or bloody stools.  Genitourinary: no complaints of dysuria or incontinence, no hesitancy, no nocturia, no genital lesion, no inadequacy of penile erection.  Musculoskeletal: no complaints of arthralgia, no myalgia, no joint swelling or stiffness, no limb pain or swelling.  Integumentary: no complaints of skin rash or lesion, no itching or dry skin, no skin wounds.      Active Problems  1. Abnormal EKG (794.31) (R94.31)   2. Acquired ankle/foot deformity (736.70) (M21.969)   3. Acute knee pain, right   4. Basal cell carcinoma of skin (173.91) (C44.91)   5. Benign essential hypertension (401.1) (I10)   6. BMI 31.0-31.9,adult (V85.31) (Z68.31)   7. Callus (700) (L84)   8. Cough (786.2) (R05)   9. Deformity of ankle and foot, acquired (736.70) (M21.969)   10. Diabetes mellitus  with neuropathy (250.60,357.2) (E11.40)   11. Diabetic neuropathy (250.60,357.2) (E11.40)   12. Dizziness (780.4) (R42)   13. Encounter for prostate cancer screening (V76.44) (Z12.5)   14. Encounter for screening for cardiovascular disorders (V81.2) (Z13.6)   15. Encounter for screening for malignant neoplasm of colon (V76.51) (Z12.11)   16. Encounter for screening for malignant neoplasm of prostate (V76.44) (Z12.5)   17. Hypercholesterolemia (272.0) (E78.00)   18. Hypokalemia (276.8) (E87.6)   19. Limb pain (729.5) (M79.609)   20. Medicare annual wellness visit, initial (V70.0) (Z00.00)   21. 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 Need for vaccination (V05.9) (Z23)   23. Never a smoker   24. Non-healing ulcer (707.9) (L98.499)   25. Obesity, Class I, BMI 30-34.9 (278.00) (E66.9)   26. Onychomycosis (110.1) (B35.1)   27. Open wound of foot, left, subsequent encounter (V58.89,892.0) (S91.302D)   28. Pain in both feet (729.5) (M79.671,M79.672)   29. Primary dysthymia (300.4) (F34.1)   30. Screening for hypothyroidism (V77.0) (Z13.29)   31. Wound of skin (782.9) (R23.8)     Past Medical History   · History of Acute upper respiratory infection (465.9) (J06.9)   · History of Ascending cholangitis (576.1) (K83.0)   · History of Atherosclerosis of arteries of extremities (440.20) (I70.209)   · History of Bug bite with infection (919.5,E906.4) (W57.XXXA)   · History of Callus (700) (L84)   · History of Need for vaccination (V05.9) (Z23)   · Screening for genitourinary condition (V81.6) (Z13.89)   · History of Screening for genitourinary condition (V81.6) (Z13.89)     The active problems and past medical history were reviewed and updated today.      Surgical History      · History of Appendectomy   · History of Cholecystectomy   · History of Open Treatment Of Fracture Of The Radial Shaft   · History of Tonsillectomy     The surgical history was reviewed and updated today.       Family History  Mother    · Family history of  Heart disease   · Family history of HTN (hypertension)   · Family history of Liver cancer   · Family history of Melanoma  Father    · Family history of    · Family history of Gastric ulcer  Brother    · Family history of Diabetes  Grandparent    · Family history of Diabetes     Social History      · Never a smoker  The social history was reviewed and updated today.    The medication list was reviewed and updated today.      Allergies  1. No Known Drug Allergies         Physical Exam  Left Foot: Appearance: Normal except as noted: excessive pronation-- and-- pes planus. Great toe deformities include a bunion. Tenderness: None except the great toe.   Right Foot: Appearance: Normal except as noted: excessive pronation-- and-- pes planus. Great toe deformities include a bunion. Tenderness: None except the great toe.   Left Ankle: ROM: limited ROM in all planes Motor: diffuse weakness.   Right Ankle: ROM: limited ROM in all planes Motor: diffuse weakness.   Neurological Exam: performed. Light touch was decreased bilaterally. Vibratory sensation was decreased in both first metatarsophalangeal joints.   Vascular Exam: performed Dorsalis pedis pulses were One/4 bilaterally. Posterior tibial pulses were One/4 bilaterally. Elevation Pallor: present bilaterally. Capillary refill time was greater than 3 seconds bilaterally-- and-- Q 9, findings bilateral. Negative digital hair. Edema: mild bilaterally-- and-- No Homans sign.   Toenails: All of the toenails were elongated,-- hypertrophied,-- discolored,-- shown to have subungual debris-- and-- Mycotic.    Socks and shoes removed, Right Foot Findings: erythematous and dry. Diminished tactile sensation with monofilament testing throughout the right foot.  Socks and shoes removed, Left Foot Findings: erythematous and dry. Diminished tactile sensation with monofilament testing throughout the left foot. Capillary refills findings on the right were delayed in the toes.   Pulses:  1+ in the posterior tibialis on the right  1+ in the dorsalis pedis on the right.  Capillary refills findings on the left were delayed in the toes.  Pulses:  1+ in the posterior tibialis on the left  1+ in the dorsalis pedis on the left.  Assign Risk Category: 2: Loss of protective sensation with or without weakness, deformity, callus, pre-ulcer, or history of ulceration. High risk.   Hyperkeratosis: present on both first toes,-- present on both first sub metatarsals-- and-- Pinch callus, bilateral.   Shoe Gear Evaluation: performed (). Right Foot: width: E.-- and-- length: 12. Left Foot: width: E.-- and-- length: 12. Recommendation(s): custom inlays.     Patient's shoes and socks removed.Right Foot/Ankle   Right Foot Inspection  Skin Exam: callus and callus               Toe Exam: swelling and erythema  Sensory   Vibration: diminished  Proprioception: diminished      Vascular  Capillary refills: elevated  The right DP pulse is 1+. The right PT pulse is 1+.      Left Foot/Ankle  Left Foot Inspection  Skin Exam: callus              Toe Exam: swelling and erythema                   Sensory   Vibration: diminished  Proprioception: diminished     Vascular  Capillary refills: elevated  The left DP pulse is 1+. The left PT pulse is 1+.      Patient's shoes and socks removed.     Right Foot/Ankle   Right Foot Inspection        Toe Exam: right toe deformity.      Sensory   Vibration: diminished        Vascular  Capillary refills: < 3 seconds        Left Foot/Ankle  Left Foot Inspection        Toe Exam: left toe deformity.      Sensory   Vibration: diminished        Vascular  Capillary refills: < 3 seconds      Patient's shoes and socks removed.    Right Foot/Ankle   Right Foot Inspection  Skin Exam: dry skin and pre-ulcer.     Toe Exam: right toe deformity.     Sensory   Vibration: diminished  Proprioception: diminished  Monofilament testing: diminished    Vascular  Capillary refills: < 3 seconds  The right DP  pulse is 1+. The right PT pulse is 2+.     Left Foot/Ankle  Left Foot Inspection  Skin Exam: dry skin and pre-ulcer.     Toe Exam: left toe deformity.     Sensory   Vibration: diminished  Proprioception: diminished  Monofilament testing: diminished    Vascular  Capillary refills: < 3 seconds  The left DP pulse is 1+. The left PT pulse is 2+.     Assign Risk Category  Deformity present  Loss of protective sensation  Weak pulses  Risk: 2

## 2025-03-18 DIAGNOSIS — E11.42 DIABETIC POLYNEUROPATHY ASSOCIATED WITH TYPE 2 DIABETES MELLITUS (HCC): ICD-10-CM

## 2025-03-19 DIAGNOSIS — E11.43 TYPE 2 DIABETES MELLITUS WITH DIABETIC AUTONOMIC NEUROPATHY, WITHOUT LONG-TERM CURRENT USE OF INSULIN (HCC): ICD-10-CM

## 2025-03-19 RX ORDER — SITAGLIPTIN 50 MG/1
50 TABLET, FILM COATED ORAL DAILY
Qty: 90 TABLET | Refills: 3 | Status: SHIPPED | OUTPATIENT
Start: 2025-03-19

## 2025-03-26 RX ORDER — TIRZEPATIDE 5 MG/.5ML
INJECTION, SOLUTION SUBCUTANEOUS
Qty: 6 ML | Refills: 3 | Status: SHIPPED | OUTPATIENT
Start: 2025-03-26

## 2025-03-28 ENCOUNTER — RA CDI HCC (OUTPATIENT)
Dept: OTHER | Facility: HOSPITAL | Age: 89
End: 2025-03-28

## 2025-04-04 ENCOUNTER — OFFICE VISIT (OUTPATIENT)
Dept: FAMILY MEDICINE CLINIC | Facility: CLINIC | Age: 89
End: 2025-04-04
Payer: COMMERCIAL

## 2025-04-04 VITALS
WEIGHT: 211 LBS | HEIGHT: 72 IN | SYSTOLIC BLOOD PRESSURE: 142 MMHG | TEMPERATURE: 97 F | BODY MASS INDEX: 28.58 KG/M2 | HEART RATE: 64 BPM | DIASTOLIC BLOOD PRESSURE: 78 MMHG

## 2025-04-04 DIAGNOSIS — M54.50 LOW BACK PAIN, UNSPECIFIED BACK PAIN LATERALITY, UNSPECIFIED CHRONICITY, UNSPECIFIED WHETHER SCIATICA PRESENT: ICD-10-CM

## 2025-04-04 DIAGNOSIS — I10 HYPERTENSION, UNSPECIFIED TYPE: ICD-10-CM

## 2025-04-04 DIAGNOSIS — E78.2 MIXED HYPERLIPIDEMIA: ICD-10-CM

## 2025-04-04 DIAGNOSIS — Z00.00 MEDICARE ANNUAL WELLNESS VISIT, SUBSEQUENT: Primary | ICD-10-CM

## 2025-04-04 DIAGNOSIS — I12.9 BENIGN HYPERTENSION WITH CKD (CHRONIC KIDNEY DISEASE) STAGE III (HCC): ICD-10-CM

## 2025-04-04 DIAGNOSIS — E11.43 TYPE 2 DIABETES MELLITUS WITH DIABETIC AUTONOMIC NEUROPATHY, WITHOUT LONG-TERM CURRENT USE OF INSULIN (HCC): ICD-10-CM

## 2025-04-04 DIAGNOSIS — N18.30 BENIGN HYPERTENSION WITH CKD (CHRONIC KIDNEY DISEASE) STAGE III (HCC): ICD-10-CM

## 2025-04-04 PROCEDURE — G0439 PPPS, SUBSEQ VISIT: HCPCS | Performed by: FAMILY MEDICINE

## 2025-04-04 RX ORDER — TRAMADOL HYDROCHLORIDE 50 MG/1
50 TABLET ORAL EVERY 6 HOURS PRN
Qty: 20 TABLET | Refills: 0 | Status: SHIPPED | OUTPATIENT
Start: 2025-04-04 | End: 2025-04-12 | Stop reason: SDUPTHER

## 2025-04-04 NOTE — PATIENT INSTRUCTIONS
Medicare Preventive Visit Patient Instructions  Thank you for completing your Welcome to Medicare Visit or Medicare Annual Wellness Visit today. Your next wellness visit will be due in one year (4/5/2026).  The screening/preventive services that you may require over the next 5-10 years are detailed below. Some tests may not apply to you based off risk factors and/or age. Screening tests ordered at today's visit but not completed yet may show as past due. Also, please note that scanned in results may not display below.  Preventive Screenings:  Service Recommendations Previous Testing/Comments   Colorectal Cancer Screening  Colonoscopy    Fecal Occult Blood Test (FOBT)/Fecal Immunochemical Test (FIT)  Fecal DNA/Cologuard Test  Flexible Sigmoidoscopy Age: 45-75 years old   Colonoscopy: every 10 years (May be performed more frequently if at higher risk)  OR  FOBT/FIT: every 1 year  OR  Cologuard: every 3 years  OR  Sigmoidoscopy: every 5 years  Screening may be recommended earlier than age 45 if at higher risk for colorectal cancer. Also, an individualized decision between you and your healthcare provider will decide whether screening between the ages of 76-85 would be appropriate. Colonoscopy: Not on file  FOBT/FIT: Not on file  Cologuard: Not on file  Sigmoidoscopy: Not on file    Screening Not Indicated     Prostate Cancer Screening Individualized decision between patient and health care provider in men between ages of 55-69   Medicare will cover every 12 months beginning on the day after your 50th birthday PSA: No results in last 5 years     Screening Not Indicated     Hepatitis C Screening Once for adults born between 1945 and 1965  More frequently in patients at high risk for Hepatitis C Hep C Antibody: Not on file        Diabetes Screening 1-2 times per year if you're at risk for diabetes or have pre-diabetes Fasting glucose: No results in last 5 years (No results in last 5 years)  A1C: 6.7 %  (6/26/2024)  Screening Not Indicated  History Diabetes   Cholesterol Screening Once every 5 years if you don't have a lipid disorder. May order more often based on risk factors. Lipid panel: 02/20/2024  Screening Not Indicated  History Lipid Disorder      Other Preventive Screenings Covered by Medicare:  Abdominal Aortic Aneurysm (AAA) Screening: covered once if your at risk. You're considered to be at risk if you have a family history of AAA or a male between the age of 65-75 who smoking at least 100 cigarettes in your lifetime.  Lung Cancer Screening: covers low dose CT scan once per year if you meet all of the following conditions: (1) Age 55-77; (2) No signs or symptoms of lung cancer; (3) Current smoker or have quit smoking within the last 15 years; (4) You have a tobacco smoking history of at least 20 pack years (packs per day x number of years you smoked); (5) You get a written order from a healthcare provider.  Glaucoma Screening: covered annually if you're considered high risk: (1) You have diabetes OR (2) Family history of glaucoma OR (3)  aged 50 and older OR (4)  American aged 65 and older  Osteoporosis Screening: covered every 2 years if you meet one of the following conditions: (1) Have a vertebral abnormality; (2) On glucocorticoid therapy for more than 3 months; (3) Have primary hyperparathyroidism; (4) On osteoporosis medications and need to assess response to drug therapy.  HIV Screening: covered annually if you're between the age of 15-65. Also covered annually if you are younger than 15 and older than 65 with risk factors for HIV infection. For pregnant patients, it is covered up to 3 times per pregnancy.    Immunizations:  Immunization Recommendations   Influenza Vaccine Annual influenza vaccination during flu season is recommended for all persons aged >= 6 months who do not have contraindications   Pneumococcal Vaccine   * Pneumococcal conjugate vaccine = PCV13 (Prevnar  13), PCV15 (Vaxneuvance), PCV20 (Prevnar 20)  * Pneumococcal polysaccharide vaccine = PPSV23 (Pneumovax) Adults 19-65 yo with certain risk factors or if 65+ yo  If never received any pneumonia vaccine: recommend Prevnar 20 (PCV20)  Give PCV20 if previously received 1 dose of PCV13 or PPSV23   Hepatitis B Vaccine 3 dose series if at intermediate or high risk (ex: diabetes, end stage renal disease, liver disease)   Respiratory syncytial virus (RSV) Vaccine - COVERED BY MEDICARE PART D  * RSVPreF3 (Arexvy) CDC recommends that adults 60 years of age and older may receive a single dose of RSV vaccine using shared clinical decision-making (SCDM)   Tetanus (Td) Vaccine - COST NOT COVERED BY MEDICARE PART B Following completion of primary series, a booster dose should be given every 10 years to maintain immunity against tetanus. Td may also be given as tetanus wound prophylaxis.   Tdap Vaccine - COST NOT COVERED BY MEDICARE PART B Recommended at least once for all adults. For pregnant patients, recommended with each pregnancy.   Shingles Vaccine (Shingrix) - COST NOT COVERED BY MEDICARE PART B  2 shot series recommended in those 19 years and older who have or will have weakened immune systems or those 50 years and older     Health Maintenance Due:  There are no preventive care reminders to display for this patient.  Immunizations Due:      Topic Date Due   • Influenza Vaccine (1) 09/01/2024   • COVID-19 Vaccine (4 - 2024-25 season) 09/01/2024     Advance Directives   What are advance directives?  Advance directives are legal documents that state your wishes and plans for medical care. These plans are made ahead of time in case you lose your ability to make decisions for yourself. Advance directives can apply to any medical decision, such as the treatments you want, and if you want to donate organs.   What are the types of advance directives?  There are many types of advance directives, and each state has rules about how to  use them. You may choose a combination of any of the following:  Living will:  This is a written record of the treatment you want. You can also choose which treatments you do not want, which to limit, and which to stop at a certain time. This includes surgery, medicine, IV fluid, and tube feedings.   Durable power of  for healthcare (DPAHC):  This is a written record that states who you want to make healthcare choices for you when you are unable to make them for yourself. This person, called a proxy, is usually a family member or a friend. You may choose more than 1 proxy.  Do not resuscitate (DNR) order:  A DNR order is used in case your heart stops beating or you stop breathing. It is a request not to have certain forms of treatment, such as CPR. A DNR order may be included in other types of advance directives.  Medical directive:  This covers the care that you want if you are in a coma, near death, or unable to make decisions for yourself. You can list the treatments you want for each condition. Treatment may include pain medicine, surgery, blood transfusions, dialysis, IV or tube feedings, and a ventilator (breathing machine).  Values history:  This document has questions about your views, beliefs, and how you feel and think about life. This information can help others choose the care that you would choose.  Why are advance directives important?  An advance directive helps you control your care. Although spoken wishes may be used, it is better to have your wishes written down. Spoken wishes can be misunderstood, or not followed. Treatments may be given even if you do not want them. An advance directive may make it easier for your family to make difficult choices about your care.   Weight Management   Why it is important to manage your weight:  Being overweight increases your risk of health conditions such as heart disease, high blood pressure, type 2 diabetes, and certain types of cancer. It can also  increase your risk for osteoarthritis, sleep apnea, and other respiratory problems. Aim for a slow, steady weight loss. Even a small amount of weight loss can lower your risk of health problems.  How to lose weight safely:  A safe and healthy way to lose weight is to eat fewer calories and get regular exercise. You can lose up about 1 pound a week by decreasing the number of calories you eat by 500 calories each day.   Healthy meal plan for weight management:  A healthy meal plan includes a variety of foods, contains fewer calories, and helps you stay healthy. A healthy meal plan includes the following:  Eat whole-grain foods more often.  A healthy meal plan should contain fiber. Fiber is the part of grains, fruits, and vegetables that is not broken down by your body. Whole-grain foods are healthy and provide extra fiber in your diet. Some examples of whole-grain foods are whole-wheat breads and pastas, oatmeal, brown rice, and bulgur.  Eat a variety of vegetables every day.  Include dark, leafy greens such as spinach, kale, regan greens, and mustard greens. Eat yellow and orange vegetables such as carrots, sweet potatoes, and winter squash.   Eat a variety of fruits every day.  Choose fresh or canned fruit (canned in its own juice or light syrup) instead of juice. Fruit juice has very little or no fiber.  Eat low-fat dairy foods.  Drink fat-free (skim) milk or 1% milk. Eat fat-free yogurt and low-fat cottage cheese. Try low-fat cheeses such as mozzarella and other reduced-fat cheeses.  Choose meat and other protein foods that are low in fat.  Choose beans or other legumes such as split peas or lentils. Choose fish, skinless poultry (chicken or turkey), or lean cuts of red meat (beef or pork). Before you cook meat or poultry, cut off any visible fat.   Use less fat and oil.  Try baking foods instead of frying them. Add less fat, such as margarine, sour cream, regular salad dressing and mayonnaise to foods. Eat  fewer high-fat foods. Some examples of high-fat foods include french fries, doughnuts, ice cream, and cakes.  Eat fewer sweets.  Limit foods and drinks that are high in sugar. This includes candy, cookies, regular soda, and sweetened drinks.  Exercise:  Exercise at least 30 minutes per day on most days of the week. Some examples of exercise include walking, biking, dancing, and swimming. You can also fit in more physical activity by taking the stairs instead of the elevator or parking farther away from stores. Ask your healthcare provider about the best exercise plan for you.      © Copyright [employer] 2018 Information is for End User's use only and may not be sold, redistributed or otherwise used for commercial purposes. All illustrations and images included in CareNotes® are the copyrighted property of [employer] or [employer]

## 2025-04-04 NOTE — PROGRESS NOTES
Name: Danny Beckham      : 1935      MRN: [id number]  Encounter Provider: Frank Lombardi, DO  Encounter Date: 2025   Encounter department: [facility]  :  Assessment & Plan  Medicare annual wellness visit, subsequent         Type 2 diabetes mellitus with diabetic autonomic neuropathy, without long-term current use of insulin (HCC)    Lab Results   Component Value Date    HGBA1C 6.7 (H) 2024       Orders:    Albumin / creatinine urine ratio; Future    Comprehensive metabolic panel; Future    Hemoglobin A1C; Future    CBC and differential; Future    Lipid Panel with Direct LDL reflex; Future    TSH, 3rd generation with Free T4 reflex; Future    Hypertension, unspecified type    Orders:    Albumin / creatinine urine ratio; Future    Comprehensive metabolic panel; Future    Hemoglobin A1C; Future    CBC and differential; Future    Lipid Panel with Direct LDL reflex; Future    TSH, 3rd generation with Free T4 reflex; Future    Mixed hyperlipidemia    Orders:    Albumin / creatinine urine ratio; Future    Comprehensive metabolic panel; Future    Hemoglobin A1C; Future    CBC and differential; Future    Lipid Panel with Direct LDL reflex; Future    TSH, 3rd generation with Free T4 reflex; Future    Low back pain, unspecified back pain laterality, unspecified chronicity, unspecified whether sciatica present    Orders:    traMADol (Ultram) 50 mg tablet; Take 1 tablet (50 mg total) by mouth every 6 (six) hours as needed for moderate pain       Preventive health issues were discussed with patient, and age appropriate screening tests were ordered as noted in patient's After Visit Summary. Personalized health advice and appropriate referrals for health education or preventive services given if needed, as noted in patient's After Visit Summary.    History of Present Illness     Pt is sched for an AWV   No labs       Patient Care Team:  Frank Lombardi, DO as PCP - General  JULEE Chandra  CAM Chapa MD (Nephrology)    Review of Systems   Musculoskeletal:  Positive for arthralgias and back pain.     Medical History Reviewed by provider this encounter:       Annual Wellness Visit Questionnaire   Danny is here for his Subsequent Wellness visit. Last Medicare Wellness visit information reviewed, patient interviewed, no change since last AWV.     Health Risk Assessment:   Patient rates overall health as good. Patient feels that their physical health rating is slightly worse. Patient is very satisfied with their life. Eyesight was rated as much better. Hearing was rated as same. Patient feels that their emotional and mental health rating is same. Patients states they are never, rarely angry. Patient states they are always unusually tired/fatigued. Pain experienced in the last 7 days has been some. Patient's pain rating has been 5/10. Patient states that he has experienced no weight loss or gain in last 6 months.     Depression Screening:   PHQ-9 Score: 0      Fall Risk Screening:   In the past year, patient has experienced: no history of falling in past year      Home Safety:  Patient does not have trouble with stairs inside or outside of their home. Patient has working smoke alarms and has working carbon monoxide detector. Home safety hazards include: none.     Nutrition:   Current diet is Regular.     Medications:   Patient is not currently taking any over-the-counter supplements. Patient is able to manage medications.     Activities of Daily Living (ADLs)/Instrumental Activities of Daily Living (IADLs):   Walk and transfer into and out of bed and chair?: Yes  Dress and groom yourself?: Yes    Bathe or shower yourself?: Yes    Feed yourself? Yes  Do your laundry/housekeeping?: Yes  Manage your money, pay your bills and track your expenses?: Yes  Make your own meals?: Yes    Do your own shopping?: Yes    Previous Hospitalizations:   Any hospitalizations or ED visits within the last 12 months?: No       Advance Care Planning:   Living will: Yes    Durable POA for healthcare: Yes    Advanced directive: Yes      Cognitive Screening:   Provider or family/friend/caregiver concerned regarding cognition?: No    PREVENTIVE SCREENINGS      Cardiovascular Screening:    General: Screening Not Indicated and History Lipid Disorder      Diabetes Screening:     General: Screening Not Indicated and History Diabetes      Colorectal Cancer Screening:     General: Screening Not Indicated      Prostate Cancer Screening:    General: Screening Not Indicated and Patient Declines      Osteoporosis Screening:    General: Screening Not Indicated      Abdominal Aortic Aneurysm (AAA) Screening:        General: Screening Not Indicated      Lung Cancer Screening:     General: Screening Not Indicated    Screening, Brief Intervention, and Referral to Treatment (SBIRT)     Screening  Typical number of drinks in a day: 0  Typical number of drinks in a week: 0  Interpretation: Low risk drinking behavior.    Single Item Drug Screening:  How often have you used an illegal drug (including marijuana) or a prescription medication for non-medical reasons in the past year? never    Single Item Drug Screen Score: 0  Interpretation: Negative screen for possible drug use disorder    Social Drivers of Health     Financial Resource Strain: Low Risk  (6/27/2023)    Overall Financial Resource Strain (CARDIA)     Difficulty of Paying Living Expenses: Not hard at all   Food Insecurity: No Food Insecurity (4/4/2025)    Hunger Vital Sign     Worried About Running Out of Food in the Last Year: Never true     Ran Out of Food in the Last Year: Never true   Transportation Needs: No Transportation Needs (4/4/2025)    PRAPARE - Transportation     Lack of Transportation (Medical): No     Lack of Transportation (Non-Medical): No   Housing Stability: Low Risk  (4/4/2025)    Housing Stability Vital Sign     Unable to Pay for Housing in the Last Year: No     Number of  "Times Moved in the Last Year: 0     Homeless in the Last Year: No   Utilities: Not At Risk (4/4/2025)    [facility] Utilities     Threatened with loss of utilities: No     No results found.    Objective   /78   Pulse 64   Temp (!) 97 °F (36.1 °C)   Ht 5' 11.5\" (1.816 m)   Wt 95.7 kg (211 lb)   BMI 29.02 kg/m²     Physical Exam    "

## 2025-04-04 NOTE — ASSESSMENT & PLAN NOTE
Lab Results   Component Value Date    HGBA1C 6.7 (H) 06/26/2024       Orders:    Albumin / creatinine urine ratio; Future    Comprehensive metabolic panel; Future    Hemoglobin A1C; Future    CBC and differential; Future    Lipid Panel with Direct LDL reflex; Future    TSH, 3rd generation with Free T4 reflex; Future

## 2025-04-06 DIAGNOSIS — I10 BENIGN ESSENTIAL HYPERTENSION: ICD-10-CM

## 2025-04-06 DIAGNOSIS — E11.42 DIABETIC POLYNEUROPATHY ASSOCIATED WITH TYPE 2 DIABETES MELLITUS (HCC): ICD-10-CM

## 2025-04-06 DIAGNOSIS — E78.2 MIXED HYPERLIPIDEMIA: ICD-10-CM

## 2025-04-07 ENCOUNTER — TELEPHONE (OUTPATIENT)
Dept: ADMINISTRATIVE | Facility: OTHER | Age: 89
End: 2025-04-07

## 2025-04-07 ENCOUNTER — VBI (OUTPATIENT)
Dept: ADMINISTRATIVE | Facility: OTHER | Age: 89
End: 2025-04-07

## 2025-04-07 NOTE — TELEPHONE ENCOUNTER
Upon review of the In Basket request and the patient's chart, initial outreach has been made via fax to facility. Please see Contacts section for details.     Thank you  Leta Bui MA

## 2025-04-07 NOTE — LETTER
Diabetic Eye Exam Form    Date Requested: 25  Patient: Danny Beckham  Patient : 1935   Referring Provider: Frank Lombardi, DO      DIABETIC Eye Exam Date _______________________________      Type of Exam MUST be documented for Diabetic Eye Exams. Please CHECK ONE.     Retinal Exam       Dilated Retinal Exam       OCT       Optomap-Iris Exam      Fundus Photography       Left Eye - Please check Retinopathy or No Retinopathy        Exam did show retinopathy    Exam did not show retinopathy       Right Eye - Please check Retinopathy or No Retinopathy       Exam did show retinopathy    Exam did not show retinopathy       Comments ___Most recent  _______________________________________________________    Practice Providing Exam ______________________________________________    Exam Performed By (print name) _______________________________________      Provider Signature ___________________________________________________      These reports are needed for  compliance.    Please fax this completed form and a copy of the Diabetic Eye Exam report to the [facility] Based Department as soon as possible via Fax [phone], attention Leta: Phone [phone]. Our office is located at [address] [place] [address].     We thank you for your assistance in treating our mutual patient.       Spoke with mom, advised that patient is due for vaccinations and WCE.  Scheduled on August 16 at 10:30am.

## 2025-04-07 NOTE — TELEPHONE ENCOUNTER
----- Message from Frank Lombardi, DO sent at 4/4/2025 11:27 AM EDT -----  Regarding: eye  04/04/25 11:27 AM    Hello, our patient Danny Beckham has had Diabetic Eye Exam completed/performed. Please assist in updating the patient chart by making an External outreach to [facility] facility located in [place]. The date of service is recen.    Thank you,  Frank Lombardi, DO  [facility]

## 2025-04-08 RX ORDER — CARVEDILOL PHOSPHATE 20 MG/1
20 CAPSULE, EXTENDED RELEASE ORAL DAILY
Qty: 90 CAPSULE | Refills: 1 | Status: SHIPPED | OUTPATIENT
Start: 2025-04-08

## 2025-04-08 RX ORDER — SIMVASTATIN 10 MG
10 TABLET ORAL
Qty: 90 TABLET | Refills: 0 | Status: SHIPPED | OUTPATIENT
Start: 2025-04-08

## 2025-04-08 NOTE — TELEPHONE ENCOUNTER
Upon review of the In Basket request we were able to locate, review, and update the patient chart as requested for Diabetic Eye Exam.    Any additional questions or concerns should be emailed to the Practice Liaisons via the appropriate education email address, please do not reply via In Basket.    Thank you  Leta Bui MA   [facility]

## 2025-04-11 DIAGNOSIS — M54.50 LOW BACK PAIN, UNSPECIFIED BACK PAIN LATERALITY, UNSPECIFIED CHRONICITY, UNSPECIFIED WHETHER SCIATICA PRESENT: Primary | ICD-10-CM

## 2025-04-11 RX ORDER — KETOROLAC TROMETHAMINE 10 MG/1
10 TABLET, FILM COATED ORAL 3 TIMES DAILY PRN
Qty: 9 TABLET | Refills: 0 | Status: SHIPPED | OUTPATIENT
Start: 2025-04-11 | End: 2025-04-14

## 2025-04-12 DIAGNOSIS — M54.50 LOW BACK PAIN, UNSPECIFIED BACK PAIN LATERALITY, UNSPECIFIED CHRONICITY, UNSPECIFIED WHETHER SCIATICA PRESENT: ICD-10-CM

## 2025-04-12 RX ORDER — TRAMADOL HYDROCHLORIDE 50 MG/1
50 TABLET ORAL EVERY 6 HOURS PRN
Qty: 20 TABLET | Refills: 0 | Status: SHIPPED | OUTPATIENT
Start: 2025-04-12

## 2025-04-24 ENCOUNTER — PROCEDURE VISIT (OUTPATIENT)
Age: 89
End: 2025-04-24
Payer: COMMERCIAL

## 2025-04-24 VITALS — WEIGHT: 211 LBS | HEIGHT: 72 IN | BODY MASS INDEX: 28.58 KG/M2 | RESPIRATION RATE: 17 BRPM

## 2025-04-24 DIAGNOSIS — E11.42 DIABETIC POLYNEUROPATHY ASSOCIATED WITH TYPE 2 DIABETES MELLITUS (HCC): Primary | ICD-10-CM

## 2025-04-24 DIAGNOSIS — M79.672 PAIN IN BOTH FEET: ICD-10-CM

## 2025-04-24 DIAGNOSIS — L84 CORNS: ICD-10-CM

## 2025-04-24 DIAGNOSIS — M79.671 PAIN IN BOTH FEET: ICD-10-CM

## 2025-04-24 DIAGNOSIS — I70.209 PERIPHERAL ARTERIOSCLEROSIS (HCC): ICD-10-CM

## 2025-04-24 PROCEDURE — RECHECK: Performed by: PODIATRIST

## 2025-04-24 PROCEDURE — 11056 PARNG/CUTG B9 HYPRKR LES 2-4: CPT | Performed by: PODIATRIST

## 2025-04-24 NOTE — PROGRESS NOTES
Assessment.  Diabetic neuropathy.  Pain upon ambulation.  Peripheral artery disease.  Mycosis of nail.  Callus formation.  Foot deformity.  History of radiculopathy.     Plan.  Chart reviewed.  Foot exam performed.  Patient educated on care of the diabetic foot.  All mycotic nails debrided.  Nails debrided mechanically with nail nipper.  Calluses debrided without pain or complication.  Aftercare instruction given.  Return for follow-up.    Because patient is having back pain and has a history of chronic radicular issues, we recommend pain management services.  She will consider.        Subjective:  patient, a diabetic white male, complains of pain in his feet with ambulation.  No history of trauma.  Patient has chronic low back pain.  .  History of Present Illness  HPI: Patient is a diabetic with pain in his feet with ambulation. No history of trauma. Patient is pre-ulcerative calluses. These hurt with ambulation.      Review of Systems   Constitutional: feeling poorly, but-- as noted in HPI,-- no fever-- and-- no chills.  ENT: nasal discharge, but-- as noted in HPI.  Cardiovascular: no complaints of slow or fast heart rate, no chest pain, no palpitations, no leg claudication or lower extremity edema.  Respiratory: cough, but-- as noted in HPI.  Gastrointestinal: no complaints of abdominal pain, no constipation, no nausea or vomiting, no diarrhea or bloody stools.  Genitourinary: no complaints of dysuria or incontinence, no hesitancy, no nocturia, no genital lesion, no inadequacy of penile erection.  Musculoskeletal: no complaints of arthralgia, no myalgia, no joint swelling or stiffness, no limb pain or swelling.  Integumentary: no complaints of skin rash or lesion, no itching or dry skin, no skin wounds.      Active Problems  1. Abnormal EKG (794.31) (R94.31)   2. Acquired ankle/foot deformity (736.70) (M21.969)   3. Acute knee pain, right   4. Basal cell carcinoma of skin (173.91) (C44.91)   5. Benign  essential hypertension (401.1) (I10)   6. BMI 31.0-31.9,adult (V85.31) (Z68.31)   7. Callus (700) (L84)   8. Cough (786.2) (R05)   9. Deformity of ankle and foot, acquired (736.70) (M21.969)   10. Diabetes mellitus with neuropathy (250.60,357.2) (E11.40)   11. Diabetic neuropathy (250.60,357.2) (E11.40)   12. Dizziness (780.4) (R42)   13. Encounter for prostate cancer screening (V76.44) (Z12.5)   14. Encounter for screening for cardiovascular disorders (V81.2) (Z13.6)   15. Encounter for screening for malignant neoplasm of colon (V76.51) (Z12.11)   16. Encounter for screening for malignant neoplasm of prostate (V76.44) (Z12.5)   17. Hypercholesterolemia (272.0) (E78.00)   18. Hypokalemia (276.8) (E87.6)   19. Limb pain (729.5) (M79.609)   20. Medicare annual wellness visit, initial (V70.0) (Z00.00)   21. 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 Need for vaccination (V05.9) (Z23)   23. Never a smoker   24. Non-healing ulcer (707.9) (L98.499)   25. Obesity, Class I, BMI 30-34.9 (278.00) (E66.9)   26. Onychomycosis (110.1) (B35.1)   27. Open wound of foot, left, subsequent encounter (V58.89,892.0) (S91.302D)   28. Pain in both feet (729.5) (M79.671,M79.672)   29. Primary dysthymia (300.4) (F34.1)   30. Screening for hypothyroidism (V77.0) (Z13.29)   31. Wound of skin (782.9) (R23.8)     Past Medical History   · History of Acute upper respiratory infection (465.9) (J06.9)   · History of Ascending cholangitis (576.1) (K83.0)   · History of Atherosclerosis of arteries of extremities (440.20) (I70.209)   · History of Bug bite with infection (919.5,E906.4) (W57.XXXA)   · History of Callus (700) (L84)   · History of Need for vaccination (V05.9) (Z23)   · Screening for genitourinary condition (V81.6) (Z13.89)   · History of Screening for genitourinary condition (V81.6) (Z13.89)     The active problems and past medical history were reviewed and updated today.      Surgical History      · History of Appendectomy   · History of  Cholecystectomy   · History of Open Treatment Of Fracture Of The Radial Shaft   · History of Tonsillectomy     The surgical history was reviewed and updated today.       Family History  Mother    · Family history of Heart disease   · Family history of HTN (hypertension)   · Family history of Liver cancer   · Family history of Melanoma  Father    · Family history of    · Family history of Gastric ulcer  Brother    · Family history of Diabetes  Grandparent    · Family history of Diabetes     Social History      · Never a smoker  The social history was reviewed and updated today.    The medication list was reviewed and updated today.      Allergies  1. No Known Drug Allergies         Physical Exam  Left Foot: Appearance: Normal except as noted: excessive pronation-- and-- pes planus. Great toe deformities include a bunion. Tenderness: None except the great toe.   Right Foot: Appearance: Normal except as noted: excessive pronation-- and-- pes planus. Great toe deformities include a bunion. Tenderness: None except the great toe.   Left Ankle: ROM: limited ROM in all planes Motor: diffuse weakness.   Right Ankle: ROM: limited ROM in all planes Motor: diffuse weakness.   Neurological Exam: performed. Light touch was decreased bilaterally. Vibratory sensation was decreased in both first metatarsophalangeal joints.   Vascular Exam: performed Dorsalis pedis pulses were One/4 bilaterally. Posterior tibial pulses were One/4 bilaterally. Elevation Pallor: present bilaterally. Capillary refill time was greater than 3 seconds bilaterally-- and-- Q 9, findings bilateral. Negative digital hair. Edema: mild bilaterally-- and-- No Homans sign.   Toenails: All of the toenails were elongated,-- hypertrophied,-- discolored,-- shown to have subungual debris-- and-- Mycotic.    Socks and shoes removed, Right Foot Findings: erythematous and dry. Diminished tactile sensation with monofilament testing throughout the right foot.  Socks  and shoes removed, Left Foot Findings: erythematous and dry. Diminished tactile sensation with monofilament testing throughout the left foot. Capillary refills findings on the right were delayed in the toes.  Pulses:  1+ in the posterior tibialis on the right  1+ in the dorsalis pedis on the right.  Capillary refills findings on the left were delayed in the toes.  Pulses:  1+ in the posterior tibialis on the left  1+ in the dorsalis pedis on the left.  Assign Risk Category: 2: Loss of protective sensation with or without weakness, deformity, callus, pre-ulcer, or history of ulceration. High risk.   Hyperkeratosis: present on both first toes,-- present on both first sub metatarsals-- and-- Pinch callus, bilateral.   Shoe Gear Evaluation: performed (). Right Foot: width: E.-- and-- length: 12. Left Foot: width: E.-- and-- length: 12. Recommendation(s): custom inlays.     Patient's shoes and socks removed.Right Foot/Ankle   Right Foot Inspection  Skin Exam: callus and callus               Toe Exam: swelling and erythema  Sensory   Vibration: diminished  Proprioception: diminished      Vascular  Capillary refills: elevated  The right DP pulse is 1+. The right PT pulse is 1+.      Left Foot/Ankle  Left Foot Inspection  Skin Exam: callus              Toe Exam: swelling and erythema                   Sensory   Vibration: diminished  Proprioception: diminished     Vascular  Capillary refills: elevated  The left DP pulse is 1+. The left PT pulse is 1+.      Patient's shoes and socks removed.     Right Foot/Ankle   Right Foot Inspection        Toe Exam: right toe deformity.      Sensory   Vibration: diminished        Vascular  Capillary refills: < 3 seconds        Left Foot/Ankle  Left Foot Inspection        Toe Exam: left toe deformity.      Sensory   Vibration: diminished        Vascular  Capillary refills: < 3 seconds      Patient's shoes and socks removed.     Right Foot/Ankle   Right Foot Inspection  Skin Exam: dry  skin and pre-ulcer.      Toe Exam: right toe deformity.      Sensory   Vibration: diminished  Proprioception: diminished  Monofilament testing: diminished     Vascular  Capillary refills: < 3 seconds  The right DP pulse is 1+. The right PT pulse is 2+.      Left Foot/Ankle  Left Foot Inspection  Skin Exam: dry skin and pre-ulcer.      Toe Exam: left toe deformity.      Sensory   Vibration: diminished  Proprioception: diminished  Monofilament testing: diminished     Vascular  Capillary refills: < 3 seconds  The left DP pulse is 1+. The left PT pulse is 2+.      Assign Risk Category  Deformity present  Loss of protective sensation  Weak pulses  Risk: 2

## 2025-04-29 LAB
ALBUMIN SERPL-MCNC: 4.5 G/DL (ref 3.7–4.7)
ALBUMIN/CREAT UR: 82 MG/G CREAT (ref 0–29)
ALP SERPL-CCNC: 83 IU/L (ref 44–121)
ALT SERPL-CCNC: 15 IU/L (ref 0–44)
AST SERPL-CCNC: 19 IU/L (ref 0–40)
BASOPHILS # BLD AUTO: 0 X10E3/UL (ref 0–0.2)
BASOPHILS NFR BLD AUTO: 0 %
BILIRUB SERPL-MCNC: 0.4 MG/DL (ref 0–1.2)
BUN SERPL-MCNC: 17 MG/DL (ref 8–27)
BUN/CREAT SERPL: 14 (ref 10–24)
CALCIUM SERPL-MCNC: 9.1 MG/DL (ref 8.6–10.2)
CHLORIDE SERPL-SCNC: 101 MMOL/L (ref 96–106)
CHOLEST SERPL-MCNC: 153 MG/DL (ref 100–199)
CO2 SERPL-SCNC: 20 MMOL/L (ref 20–29)
CREAT SERPL-MCNC: 1.24 MG/DL (ref 0.76–1.27)
CREAT UR-MCNC: 102.5 MG/DL
EGFR: 56 ML/MIN/1.73
EOSINOPHIL # BLD AUTO: 0.2 X10E3/UL (ref 0–0.4)
EOSINOPHIL NFR BLD AUTO: 2 %
ERYTHROCYTE [DISTWIDTH] IN BLOOD BY AUTOMATED COUNT: 12.4 % (ref 11.6–15.4)
EST. AVERAGE GLUCOSE BLD GHB EST-MCNC: 157 MG/DL
GLOBULIN SER-MCNC: 2.4 G/DL (ref 1.5–4.5)
GLUCOSE SERPL-MCNC: 150 MG/DL (ref 70–99)
HBA1C MFR BLD: 7.1 % (ref 4.8–5.6)
HCT VFR BLD AUTO: 43.6 % (ref 37.5–51)
HDLC SERPL-MCNC: 36 MG/DL
HGB BLD-MCNC: 14.8 G/DL (ref 13–17.7)
IMM GRANULOCYTES # BLD: 0 X10E3/UL (ref 0–0.1)
IMM GRANULOCYTES NFR BLD: 0 %
LDLC SERPL CALC-MCNC: 66 MG/DL (ref 0–99)
LDLC/HDLC SERPL: 1.8 RATIO (ref 0–3.6)
LYMPHOCYTES # BLD AUTO: 1.7 X10E3/UL (ref 0.7–3.1)
LYMPHOCYTES NFR BLD AUTO: 24 %
MCH RBC QN AUTO: 30.6 PG (ref 26.6–33)
MCHC RBC AUTO-ENTMCNC: 33.9 G/DL (ref 31.5–35.7)
MCV RBC AUTO: 90 FL (ref 79–97)
MICROALBUMIN UR-MCNC: 84.5 UG/ML
MICRODELETION SYND BLD/T FISH: NORMAL
MICRODELETION SYND BLD/T FISH: NORMAL
MONOCYTES # BLD AUTO: 0.6 X10E3/UL (ref 0.1–0.9)
MONOCYTES NFR BLD AUTO: 9 %
NEUTROPHILS # BLD AUTO: 4.5 X10E3/UL (ref 1.4–7)
NEUTROPHILS NFR BLD AUTO: 65 %
PLATELET # BLD AUTO: 178 X10E3/UL (ref 150–450)
POTASSIUM SERPL-SCNC: 4.2 MMOL/L (ref 3.5–5.2)
PROT SERPL-MCNC: 6.9 G/DL (ref 6–8.5)
RBC # BLD AUTO: 4.84 X10E6/UL (ref 4.14–5.8)
SL AMB VLDL CHOLESTEROL CALC: 51 MG/DL (ref 5–40)
SODIUM SERPL-SCNC: 140 MMOL/L (ref 134–144)
TRIGL SERPL-MCNC: 324 MG/DL (ref 0–149)
TSH SERPL DL<=0.005 MIU/L-ACNC: 2.68 UIU/ML (ref 0.45–4.5)
WBC # BLD AUTO: 7 X10E3/UL (ref 3.4–10.8)

## 2025-05-02 ENCOUNTER — RESULTS FOLLOW-UP (OUTPATIENT)
Dept: FAMILY MEDICINE CLINIC | Facility: CLINIC | Age: 89
End: 2025-05-02

## 2025-05-02 NOTE — TELEPHONE ENCOUNTER
Called patient discussed results of his lab work.  Lab work is quite good.  Patient does not need to follow-up till sometime in August.  At which time I will order his next set of labs for his follow-up appointment he had his yearly about a month ago so he is not due for that at this time all is good patient feels well I am very pleased with his outcome currently

## 2025-06-13 DIAGNOSIS — E11.42 DIABETIC POLYNEUROPATHY ASSOCIATED WITH TYPE 2 DIABETES MELLITUS (HCC): ICD-10-CM

## 2025-06-13 DIAGNOSIS — E78.2 MIXED HYPERLIPIDEMIA: ICD-10-CM

## 2025-06-14 RX ORDER — SIMVASTATIN 10 MG
10 TABLET ORAL
Qty: 90 TABLET | Refills: 3 | Status: SHIPPED | OUTPATIENT
Start: 2025-06-14

## 2025-06-25 DIAGNOSIS — T14.8XXA OPEN WOUND: Primary | ICD-10-CM

## 2025-06-25 RX ORDER — MUPIROCIN 2 %
OINTMENT (GRAM) TOPICAL 2 TIMES DAILY
Qty: 30 G | Refills: 0 | Status: SHIPPED | OUTPATIENT
Start: 2025-06-25

## 2025-07-01 DIAGNOSIS — L03.119 CELLULITIS OF LOWER EXTREMITY, UNSPECIFIED LATERALITY: Primary | ICD-10-CM

## 2025-07-01 RX ORDER — CEPHALEXIN 500 MG/1
500 CAPSULE ORAL EVERY 12 HOURS SCHEDULED
Qty: 20 CAPSULE | Refills: 0 | Status: SHIPPED | OUTPATIENT
Start: 2025-07-01 | End: 2025-07-11

## 2025-07-10 ENCOUNTER — HOSPITAL ENCOUNTER (OUTPATIENT)
Dept: RADIOLOGY | Facility: HOSPITAL | Age: 89
Discharge: HOME | End: 2025-07-10
Payer: COMMERCIAL

## 2025-07-10 ENCOUNTER — PROCEDURE VISIT (OUTPATIENT)
Age: 89
End: 2025-07-10
Payer: COMMERCIAL

## 2025-07-10 VITALS — RESPIRATION RATE: 17 BRPM | BODY MASS INDEX: 28.58 KG/M2 | WEIGHT: 211 LBS | HEIGHT: 72 IN

## 2025-07-10 DIAGNOSIS — L03.116 CELLULITIS OF FOOT, LEFT: ICD-10-CM

## 2025-07-10 DIAGNOSIS — S84.92XA NEURAPRAXIA OF LEFT LOWER EXTREMITY, INITIAL ENCOUNTER: ICD-10-CM

## 2025-07-10 DIAGNOSIS — E11.42 DIABETIC POLYNEUROPATHY ASSOCIATED WITH TYPE 2 DIABETES MELLITUS (HCC): ICD-10-CM

## 2025-07-10 DIAGNOSIS — M79.672 LEFT FOOT PAIN: ICD-10-CM

## 2025-07-10 DIAGNOSIS — S90.32XA CONTUSION OF LEFT FOOT, INITIAL ENCOUNTER: ICD-10-CM

## 2025-07-10 DIAGNOSIS — I70.209 PERIPHERAL ARTERIOSCLEROSIS (HCC): ICD-10-CM

## 2025-07-10 DIAGNOSIS — S90.32XA CONTUSION OF LEFT FOOT, INITIAL ENCOUNTER: Primary | ICD-10-CM

## 2025-07-10 PROCEDURE — 73630 X-RAY EXAM OF FOOT: CPT

## 2025-07-10 PROCEDURE — 99214 OFFICE O/P EST MOD 30 MIN: CPT | Performed by: PODIATRIST

## 2025-07-10 NOTE — PROGRESS NOTES
Assessment/Plan: History of contusion dorsum left foot with secondary ulcer and cellulitis.  Cellulitis resolving.  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  Pain.  Diabetic with neuropathy.    Plan.  Chart reviewed.  PCP notes reviewed.  Patient evaluated.  At this time we will continue wound care.  Today gentian violet dry sterile dressing applied.  Patient will bandage daily with Bactroban and dry sterile dressing.  In order to help with neuropraxia patient will start both Voltaren gel and alpha lipoic acid.  X-rays are being ordered to evaluate osseous structures.  Call with results.  Aftercare instruction given.  Return for follow-up.  Watch for signs of return of infection.         Diagnoses and all orders for this visit:    Contusion of left foot, initial encounter  -     XR foot 3+ vw left; Future    Diabetic polyneuropathy associated with type 2 diabetes mellitus (HCC)    Peripheral arteriosclerosis (HCC)    Left foot pain    Neurapraxia of left lower extremity, initial encounter    Cellulitis of foot, left          Subjective: Patient presents for evaluation.  Patient has history of injury.  Approximately 3 weeks prior patient slipped at home and injured his left foot.  He had degloving injury on the top of his foot.  This became infected.  He has been placed on antibiotic.  Patient is diabetic.  He has occasional pain.  He does notice numbness on the top of his foot    Allergies   Allergen Reactions    Pollen Extract Allergic Rhinitis       Current Medications[1]    Problem List[2]       Patient ID: Danny Beckham is a 89 y.o. male.    HPI    The following portions of the patient's history were reviewed and updated as appropriate:     family history includes Cancer in his mother; Diabetes in his brother, family, and maternal grandfather; Heart disease in his mother; Hypertension in his father and mother; Liver cancer in his mother; Melanoma in his mother; Other in his father.      reports  that he has never smoked. He has never been exposed to tobacco smoke. He has never used smokeless tobacco. He reports current alcohol use of about 2.0 standard drinks of alcohol per week. He reports that he does not use drugs.    Vitals:    07/10/25 1150   Resp: 17       Review of Systems      Objective:  Patient's shoes and socks removed.   Foot ExamPhysical Exam  Vitals and nursing note reviewed.   Constitutional:       Appearance: Normal appearance.     Cardiovascular:      Rate and Rhythm: Normal rate and regular rhythm.   Feet:      Comments: Dorsal medial aspect left foot demonstrates small area of excoriation/ulcer.  Mild erythema noted.  No evidence of cellulitis or occult abscess.  Dried eschar noted.  There is edema and ecchymosis on the dorsum of the foot.  Positive Tinel of intermediate dorsal cutaneous nerve.    Skin:     Capillary Refill: Capillary refill takes less than 2 seconds.     Neurological:      Mental Status: He is alert.     Psychiatric:         Mood and Affect: Mood normal.         Behavior: Behavior normal.         Thought Content: Thought content normal.         Judgment: Judgment normal.                          [1]   Current Outpatient Medications:     cephalexin (KEFLEX) 500 mg capsule, Take 1 capsule (500 mg total) by mouth every 12 (twelve) hours for 10 days, Disp: 20 capsule, Rfl: 0    mupirocin (BACTROBAN) 2 % ointment, Apply topically 2 (two) times a day, Disp: 30 g, Rfl: 0    amLODIPine-benazepril (LOTREL 5-10) 5-10 MG per capsule, TAKE 1 CAPSULE BY MOUTH DAILY, Disp: 90 capsule, Rfl: 3    Bioflavonoid Products (ZULEYKA-C) 500-200-60 MG TABS, Take by mouth, Disp: , Rfl:     carvedilol (COREG CR) 20 MG 24 hr capsule, TAKE 1 CAPSULE BY MOUTH DAILY, Disp: 90 capsule, Rfl: 1    Cinnamon 500 MG TABS, Take by mouth, Disp: , Rfl:     clotrimazole-betamethasone (LOTRISONE) 1-0.05 % cream, Apply 1 application topically 2 (two) times a day To affected area, Disp: 45 g, Rfl: 5    fluticasone  (FLONASE) 50 mcg/act nasal spray, into each nostril, Disp: , Rfl:     gatifloxacin (ZYMAXID) 0.5 %, Administer 1 drop into the left eye 2 (two) times a day, Disp: , Rfl:     glipiZIDE (GLUCOTROL XL) 10 mg 24 hr tablet, TAKE 1 TABLET BY MOUTH DAILY, Disp: 90 tablet, Rfl: 3    glucose blood test strip, by In Vitro route, Disp: , Rfl:     hydrocortisone 1 % cream, Apply topically 4 (four) times a day as needed for irritation, Disp: 120 g, Rfl: 0    Januvia 50 MG tablet, TAKE 1 TABLET BY MOUTH DAILY, Disp: 90 tablet, Rfl: 3    ketorolac (ACULAR) 0.5 % ophthalmic solution, Administer 1 drop into the left eye 4 (four) times a day, Disp: , Rfl:     ketorolac (TORADOL) 10 mg tablet, Take 1 tablet (10 mg total) by mouth 3 (three) times a day as needed for moderate pain for up to 3 days, Disp: 9 tablet, Rfl: 0    meclizine (ANTIVERT) 25 mg tablet, Take 1 tablet (25 mg total) by mouth every 8 (eight) hours as needed for dizziness, Disp: 30 tablet, Rfl: 0    Mounjaro 5 MG/0.5ML SOAJ, INJECT THE CONTENTS OF ONE PEN  SUBCUTANEOUSLY WEEKLY AS  DIRECTED, Disp: 6 mL, Rfl: 3    Multiple Vitamins-Iron (DAILY MULTIPLE VITAMIN/IRON PO), Take by mouth, Disp: , Rfl:     Omega-3 Fatty Acids (FISH OIL) 1000 MG CPDR, Take by mouth, Disp: , Rfl:     prednisoLONE acetate (PRED FORTE) 1 % ophthalmic suspension, Administer 1 drop into the left eye 4 (four) times a day, Disp: , Rfl:     simvastatin (ZOCOR) 10 mg tablet, TAKE 1 TABLET BY MOUTH DAILY AT  BEDTIME, Disp: 90 tablet, Rfl: 3    traMADol (Ultram) 50 mg tablet, Take 1 tablet (50 mg total) by mouth every 6 (six) hours as needed for moderate pain, Disp: 20 tablet, Rfl: 0    vilazodone (VIIBRYD) 20 mg tablet, TAKE 1 TABLET BY MOUTH DAILY, Disp: 90 tablet, Rfl: 3  [2]   Patient Active Problem List  Diagnosis    Corns    Pain in both feet    Diabetic polyneuropathy associated with type 2 diabetes mellitus (HCC)    Mixed hyperlipidemia    Benign hypertension with CKD (chronic kidney disease)  stage III (HCC)    Minor depression    Acquired deformity of foot    Metatarsalgia of both feet    Basal cell carcinoma of skin    Stage 3a chronic kidney disease (HCC)    Seasonal allergies    Hypokalemia    Microalbuminuria    Hypertension    Type 2 diabetes mellitus with diabetic autonomic neuropathy, without long-term current use of insulin (HCC)    Fall    Vertigo    Abnormal CT of the head

## 2025-07-11 ENCOUNTER — RESULTS FOLLOW-UP (OUTPATIENT)
Age: 89
End: 2025-07-11

## 2025-07-11 NOTE — TELEPHONE ENCOUNTER
Spoke with Yuliya and she is aware and understands the results. No questions at this time----- Message from Sam Alejandre DPM sent at 7/11/2025 12:27 PM EDT -----  Please advise patient x-ray demonstrates no evidence of broken bone/fracture.  Only bruise  ----- Message -----  From: Interface, Radiology Results In  Sent: 7/11/2025  12:21 PM EDT  To: Sam Alejandre DPM

## 2025-07-29 DIAGNOSIS — I10 HYPERTENSION, UNSPECIFIED TYPE: ICD-10-CM

## 2025-07-29 DIAGNOSIS — E11.42 DIABETIC POLYNEUROPATHY ASSOCIATED WITH TYPE 2 DIABETES MELLITUS (HCC): ICD-10-CM

## 2025-07-30 RX ORDER — GLIPIZIDE 10 MG/1
10 TABLET, FILM COATED, EXTENDED RELEASE ORAL DAILY
Qty: 90 TABLET | Refills: 1 | Status: SHIPPED | OUTPATIENT
Start: 2025-07-30

## 2025-07-30 RX ORDER — AMLODIPINE AND BENAZEPRIL HYDROCHLORIDE 5; 10 MG/1; MG/1
1 CAPSULE ORAL DAILY
Qty: 90 CAPSULE | Refills: 1 | Status: SHIPPED | OUTPATIENT
Start: 2025-07-30

## (undated) DEVICE — THE MONARCH® "D" CARTRIDGE IS A SINGLE-USE POLYPROPYLENE CARTRIDGE FOR POSTERIOR CHAMBER IOL DELIVERY: Brand: MONARCH® III

## (undated) DEVICE — B-H IRRIGATING CAN 19GA FLAT ANGLED 8MM: Brand: OPHTHALMIC CANNULA

## (undated) DEVICE — GLOVE SRG BIOGEL 7.5

## (undated) DEVICE — MICROSURGICAL INSTRUMENT IRR. CYSTITOME 25GA STRAIGHT-REVERSE CUTTING: Brand: ALCON

## (undated) DEVICE — EYE PACK CUSTOM -FINNEGAN

## (undated) DEVICE — INTREPID® TRANSFORMER IA HP: Brand: INTREPID®

## (undated) DEVICE — ACTIVE FMS W/ INTREPID* ULTRA SLEEVES, 0.9MM 45° ABS* INTREPID* BALANCED TIP: Brand: ALCON

## (undated) DEVICE — CLEARCUT® SLIT KNIFE INTREPID MICRO-COAXIAL SYSTEM 2.4 SB: Brand: CLEARCUT®; INTREPID

## (undated) DEVICE — AIR INJECT CANNULA 27GA: Brand: OPHTHALMIC CANNULA

## (undated) DEVICE — CENTURION VISION SYSTEM FMS